# Patient Record
Sex: FEMALE | Race: WHITE | Employment: OTHER | URBAN - METROPOLITAN AREA
[De-identification: names, ages, dates, MRNs, and addresses within clinical notes are randomized per-mention and may not be internally consistent; named-entity substitution may affect disease eponyms.]

---

## 2017-03-31 ENCOUNTER — HOSPITAL ENCOUNTER (OUTPATIENT)
Dept: MAMMOGRAPHY | Age: 62
Discharge: HOME | End: 2017-03-31
Attending: OBSTETRICS & GYNECOLOGY
Payer: COMMERCIAL

## 2017-03-31 DIAGNOSIS — Z12.31 VISIT FOR SCREENING MAMMOGRAM: ICD-10-CM

## 2017-03-31 PROCEDURE — 77067 SCR MAMMO BI INCL CAD: CPT

## 2017-05-31 ENCOUNTER — OFFICE VISIT (OUTPATIENT)
Dept: NEUROLOGY | Age: 62
End: 2017-05-31

## 2017-05-31 VITALS — SYSTOLIC BLOOD PRESSURE: 109 MMHG | DIASTOLIC BLOOD PRESSURE: 61 MMHG | HEART RATE: 65 BPM | OXYGEN SATURATION: 98 %

## 2017-05-31 DIAGNOSIS — G56.03 BILATERAL CARPAL TUNNEL SYNDROME: ICD-10-CM

## 2017-05-31 DIAGNOSIS — M47.22 CERVICAL RADICULOPATHY DUE TO DEGENERATIVE JOINT DISEASE OF SPINE: ICD-10-CM

## 2017-05-31 NOTE — PROCEDURES
[facility] at [address] [facility] [address] [place] [address], [address] [facility]  Tel [phone]     Fax [phone]  Electrodiagnostic Study Report  Test Date:  2017    Patient: Jovana Lucia : 1955 Physician: Enid Box MD   Sex: Male  < Ref Phys: Renzo Funes M.D. Clinical Indication; patient is a 67-year-old right-handed  female with a history of numbness in both hands, and neck pain, EMG study to rule out bilateral carpal tunnel syndrome versus cervical radiculopathy. Impression: This study shows electrophysiologic evidence of an essentially normal EMG and nerve conduction velocity study of both upper extremities, showing no clear evidence of entrapment neuropathy, carpal tunnel syndrome, or cervical radiculopathy on the basis of this exam.  Clinical correlation recommended, and correlation with imaging modalities would be recommended. Follow-up studies in 6-12 months time may also be of further diagnostic benefit if clinically indicated. EMG & NCV Findings:  All nerve conduction studies (as indicated in the following tables) were within normal limits. All F Wave latencies were within normal limits.         Nerve Conduction Studies  Motor Summary Table     Site NR Onset (ms) O-P Amp (mV) Site1 Site2 Delta-0 (ms) Dist (cm) Lamont (m/s)   Left Median Motor (Abd Poll Brev)  23°C   Wrist    3.4 7.9 Elbow Wrist 3.9 21.0 54   Elbow    7.3 7.9        Right Median Motor (Abd Poll Brev)  23.1°C   Wrist    3.4 10.1 Elbow Wrist 3.9 20.5 53   Elbow    7.3 9.1        Right Ulnar Motor (Abd Dig Minimi)  23.1°C   Wrist    2.5 10.7 B Elbow Wrist 2.6 16.0 62   B Elbow    5.1 9.7 A Elbow B Elbow 1.5 10.0 67   A Elbow    6.6 9.3          Comparison Summary Table     Site NR Peak (ms) P-T Amp (µV) Site1 Site2 Delta-P (ms)   Left Median/Ulnar Palm Comparison (Wrist - 8cm)  23°C   Median Palm    2.2 61.3 Median Palm Ulnar Palm 0.1   Ulnar Palm    2.1 46.2      Right Median/Ulnar Palm Comparison (Wrist - 8cm)  23°C   Median Palm    2.2 64.0 Median Palm Ulnar Palm 0.1   Ulnar Palm    2.1 25.8        F Wave Studies     NR F-Lat (ms) L-R F-Lat (ms)   Right Median (Mrkrs) (Abd Poll Brev)  23.1°C      23.46    Right Ulnar (Mrkrs) (Abd Dig Min)  23°C      25.78      EMG     Side Muscle Nerve Root Ins Act Fibs Psw Amp Dur Poly Recrt Comment   Right Abd Poll Brev Median C8-T1 Nml Nml Nml Nml Nml 0 Nml    Right 1stDorInt Ulnar C8-T1 Nml Nml Nml Nml Nml 0 Nml    Right Biceps Musculocut C5-6 Nml Nml Nml Nml Nml 0 Nml    Right Triceps Radial C6-7-8 Nml Nml Nml Nml Nml 0 Nml    Right Deltoid Axillary C5-6 Nml Nml Nml Nml Nml 0 Nml    Right Cervical Parasp Mid Rami C4-6 Nml Nml Nml        Right BrachioRad Radial C5-6 Nml Nml Nml Nml Nml 0 Nml    Right ExtCarRadLong Radial C6-7 Nml Nml Nml Nml Nml 0 Nml    Left Abd Poll Brev Median C8-T1 Nml Nml Nml Nml Nml 0 Nml    Left 1stDorInt Ulnar C8-T1 Nml Nml Nml Nml Nml 0 Nml    Left Biceps Musculocut C5-6 Nml Nml Nml Nml Nml 0 Nml    Left Triceps Radial C6-7-8 Nml Nml Nml Nml Nml 0 Nml    Left Deltoid Axillary C5-6 Nml Nml Nml Nml Nml 0 Nml    Left BrachioRad Radial C5-6 Nml Nml Nml Nml Nml 0 Nml    Left ExtCarRadLong Radial C6-7 Nml Nml Nml Nml Nml 0 Nml    Left Cervical Parasp Mid Rami C4-6 Nml Nml Nml            Waveforms:                         __________________  Dedra Claude, M.D.

## 2017-05-31 NOTE — LETTER
5/31/2017 10:06 AM 
 
Patient:  Marbin Dumont YOB: 1955 Date of Visit: 5/31/2017 Dear No Recipients: Thank you for referring Ms. Marbin Dumont to me for evaluation/treatment. Below are the relevant portions of my assessment and plan of care. EMG dictated in procedure note If you have questions, please do not hesitate to call me. I look forward to following Ms. Broderick along with you. Sincerely, Ronda Vu MD

## 2017-10-04 DIAGNOSIS — E03.9 ACQUIRED HYPOTHYROIDISM: Primary | ICD-10-CM

## 2017-10-05 RX ORDER — LEVOTHYROXINE SODIUM 125 UG/1
TABLET ORAL
Qty: 30 TAB | Refills: 11 | Status: SHIPPED | OUTPATIENT
Start: 2017-10-05 | End: 2018-10-08 | Stop reason: SDUPTHER

## 2017-10-05 NOTE — TELEPHONE ENCOUNTER
Requested Prescriptions     Pending Prescriptions Disp Refills    levothyroxine (SYNTHROID) 125 mcg tablet [Pharmacy Med Name: LEVOTHYROXINE 125 MCG TABLET] 30 Tab 11     Sig: TAKE 1 TABLET BY MOUTH EVERY DAY

## 2018-01-17 DIAGNOSIS — M19.90 ARTHRITIS: Primary | ICD-10-CM

## 2018-01-17 RX ORDER — DICLOFENAC SODIUM 75 MG/1
TABLET, DELAYED RELEASE ORAL
Qty: 60 TAB | Refills: 5 | Status: SHIPPED | OUTPATIENT
Start: 2018-01-17 | End: 2019-01-10 | Stop reason: ALTCHOICE

## 2018-01-17 NOTE — TELEPHONE ENCOUNTER
Requested Prescriptions     Pending Prescriptions Disp Refills    diclofenac EC (VOLTAREN) 75 mg EC tablet [Pharmacy Med Name: DICLOFENAC SOD EC 75 MG TAB] 60 Tab 5     Sig: TAKE 1 TABLET BY MOUTH TWICE A DAY

## 2018-04-17 ENCOUNTER — HOSPITAL ENCOUNTER (OUTPATIENT)
Dept: MAMMOGRAPHY | Age: 63
Discharge: HOME | End: 2018-04-17
Attending: OBSTETRICS & GYNECOLOGY
Payer: COMMERCIAL

## 2018-04-17 DIAGNOSIS — Z12.39 SCREENING BREAST EXAMINATION: ICD-10-CM

## 2018-04-17 PROCEDURE — 77067 SCR MAMMO BI INCL CAD: CPT

## 2018-04-18 ENCOUNTER — OFFICE VISIT (OUTPATIENT)
Dept: INTERNAL MEDICINE CLINIC | Age: 63
End: 2018-04-18

## 2018-04-18 VITALS
BODY MASS INDEX: 22.66 KG/M2 | DIASTOLIC BLOOD PRESSURE: 70 MMHG | HEART RATE: 74 BPM | HEIGHT: 59 IN | RESPIRATION RATE: 13 BRPM | OXYGEN SATURATION: 98 % | WEIGHT: 112.4 LBS | SYSTOLIC BLOOD PRESSURE: 100 MMHG | TEMPERATURE: 97.6 F

## 2018-04-18 DIAGNOSIS — E03.9 ACQUIRED HYPOTHYROIDISM: ICD-10-CM

## 2018-04-18 DIAGNOSIS — Z00.00 ANNUAL PHYSICAL EXAM: Primary | ICD-10-CM

## 2018-04-18 DIAGNOSIS — M81.0 AGE-RELATED OSTEOPOROSIS WITHOUT CURRENT PATHOLOGICAL FRACTURE: ICD-10-CM

## 2018-04-18 DIAGNOSIS — M15.9 PRIMARY OSTEOARTHRITIS INVOLVING MULTIPLE JOINTS: ICD-10-CM

## 2018-04-18 LAB
ALBUMIN SERPL-MCNC: 4.6 G/DL (ref 3.9–5.4)
ALKALINE PHOS POC: 68 U/L (ref 38–126)
ALT SERPL-CCNC: 73 U/L (ref 9–52)
AST SERPL-CCNC: 45 U/L (ref 14–36)
BACTERIA UA POCT, BACTPOCT: NORMAL
BILIRUB UR QL STRIP: NEGATIVE
BUN BLD-MCNC: 20 MG/DL (ref 7–17)
CALCIUM BLD-MCNC: 9.6 MG/DL (ref 8.4–10.2)
CASTS UA POCT: 0
CHLORIDE BLD-SCNC: 102 MMOL/L (ref 98–107)
CHOLEST SERPL-MCNC: 231 MG/DL (ref 0–200)
CLUE CELLS, CLUEPOCT: NEGATIVE
CO2 POC: 27 MMOL/L (ref 22–32)
CREAT BLD-MCNC: 1 MG/DL (ref 0.7–1.2)
CRYSTALS UA POCT, CRYSPOCT: NEGATIVE
EGFR (POC): 59.9
EPITHELIAL CELLS POCT: NORMAL
GLUCOSE POC: 89 MG/DL (ref 65–105)
GLUCOSE UR-MCNC: NEGATIVE MG/DL
GRAN# POC: 3.3 K/UL (ref 2–7.8)
GRAN% POC: 52.7 % (ref 37–92)
HCT VFR BLD CALC: 36.1 % (ref 37–51)
HDLC SERPL-MCNC: 94 MG/DL (ref 35–130)
HGB BLD-MCNC: 12.1 G/DL (ref 12–18)
KETONES P FAST UR STRIP-MCNC: NEGATIVE MG/DL
LDL CHOLESTEROL POC: 119.4 MG/DL (ref 0–130)
LY# POC: 2.4 K/UL (ref 0.6–4.1)
LY% POC: 41.9 % (ref 10–58.5)
MCH RBC QN: 31.2 PG (ref 26–32)
MCHC RBC-ENTMCNC: 33.6 G/DL (ref 30–36)
MCV RBC: 93 FL (ref 80–97)
MID #, POC: 0.3 K/UL (ref 0–1.8)
MID% POC: 5.4 % (ref 0.1–24)
MUCUS UA POCT, MUCPOCT: NORMAL
PH UR STRIP: 6 [PH] (ref 5–7)
PLATELET # BLD: 311 K/UL (ref 140–440)
POTASSIUM SERPL-SCNC: 4.2 MMOL/L (ref 3.6–5)
PROT SERPL-MCNC: 7.6 G/DL (ref 6.3–8.2)
PROT UR QL STRIP: NEGATIVE
RBC # BLD: 3.88 M/UL (ref 4.2–6.3)
RBC UA POCT, RBCPOCT: NORMAL
SODIUM SERPL-SCNC: 142 MMOL/L (ref 137–145)
SP GR UR STRIP: 1.01 (ref 1.01–1.02)
T4 FREE SERPL-MCNC: 1.36 NG/DL (ref 0.71–1.85)
TCHOL/HDL RATIO (POC): 2.5 (ref 0–4)
TOTAL BILIRUBIN POC: 0.7 MG/DL (ref 0.2–1.3)
TRICH UA POCT, TRICHPOC: NEGATIVE
TRIGL SERPL-MCNC: 88 MG/DL (ref 0–200)
TSH BLD-ACNC: 2.65 UIU/ML (ref 0.4–4.2)
UA UROBILINOGEN AMB POC: NORMAL (ref 0.2–1)
URINALYSIS CLARITY POC: CLEAR
URINALYSIS COLOR POC: NORMAL
URINE BLOOD POC: NORMAL
URINE CULT COMMENT, POCT: NORMAL
URINE LEUKOCYTES POC: NORMAL
URINE NITRITES POC: NEGATIVE
VITAMIN D POC: 44.7 NG/ML (ref 30–96)
VLDLC SERPL CALC-MCNC: 17.6 MG/DL
WBC # BLD: 6 K/UL (ref 4.1–10.9)
WBC UA POCT, WBCPOCT: NORMAL
YEAST UA POCT, YEASTPOC: NEGATIVE

## 2018-04-18 RX ORDER — SERTRALINE HYDROCHLORIDE 50 MG/1
TABLET, FILM COATED ORAL
Refills: 1 | COMMUNITY
Start: 2018-04-02 | End: 2020-06-05 | Stop reason: ALTCHOICE

## 2018-04-18 RX ORDER — SULFAMETHOXAZOLE AND TRIMETHOPRIM 800; 160 MG/1; MG/1
1 TABLET ORAL 2 TIMES DAILY
COMMUNITY
End: 2018-06-20 | Stop reason: ALTCHOICE

## 2018-04-18 RX ORDER — ALENDRONATE SODIUM 70 MG/1
70 TABLET ORAL
COMMUNITY
Start: 2017-05-18 | End: 2021-01-19 | Stop reason: ALTCHOICE

## 2018-04-18 NOTE — MR AVS SNAPSHOT
[address] [facility] [place] 
 
 
 [address] [address] [phone] Patient: Leonel Solorzano MRN: [id number] Visit Information Date & Time Provider Department Dept. Phone Encounter #  
 4/18/2018 10:40 AM Joya Gomez MD [facility] [address] [phone] [id number] Follow-up Instructions Return in about 1 year (around 4/18/2019). Your Appointments 4/18/2019  8:30 AM  
FOLLOW UP 10 with MD MAYTE Kearney [facility] ([facility]) Appt Note: follow up yearly [address] [address] [address] [facility] [phone] Upcoming Health Maintenance Date Due COLONOSCOPY 10/7/1972 DTaP/Tdap/Td series (1 - Tdap) 10/7/1975 PAP AKA CERVICAL CYTOLOGY 10/7/1975 ZOSTER VACCINE AGE 60> 8/7/2014 Influenza Age 5 to Adult 8/1/2017 BREAST CANCER SCRN MAMMOGRAM 3/31/2019 Allergies as of 4/18/2018  Review Complete On: 4/18/2018 By: Joya Gomez MD  
 No Known Allergies Current Immunizations  Never Reviewed No immunizations on file. Not reviewed this visit You Were Diagnosed With   
  
 Codes Comments Annual physical exam    -  Primary ICD-10-CM: Z00.00 ICD-9-CM: V70.0 Acquired hypothyroidism     ICD-10-CM: E03.9 ICD-9-CM: 244.9 Primary osteoarthritis involving multiple joints     ICD-10-CM: M15.0 ICD-9-CM: 715.09 Age-related osteoporosis without current pathological fracture     ICD-10-CM: M81.0 ICD-9-CM: 733.01 Vitals BP Pulse Temp Resp Height(growth percentile) Weight(growth percentile) 100/70 (BP 1 Location: Left arm, BP Patient Position: Sitting) 74 97.6 °F (36.4 °C) (Oral) 13 4' 11\" (1.499 m) 112 lb 6.4 oz (51 kg) SpO2 BMI OB Status Smoking Status 98% 22.7 kg/m2 Menopause Never Smoker Vitals History BMI and BSA Data Body Mass Index Body Surface Area 22.7 kg/m 2 1.46 m 2 Preferred Pharmacy Pharmacy Name Phone [id number] [facility] [phone] Your Updated Medication List  
  
   
This list is accurate as of 4/18/18 12:03 PM.  Always use your most recent med list.  
  
  
  
  
 alendronate 70 mg tablet Commonly known as:  FOSAMAX Take 70 mg by mouth. BACTRIM -800 mg per tablet Generic drug:  trimethoprim-sulfamethoxazole Take 1 Tab by mouth two (2) times a day. diclofenac EC 75 mg EC tablet Commonly known as:  VOLTAREN  
TAKE 1 TABLET BY MOUTH TWICE A DAY  
  
 levothyroxine 125 mcg tablet Commonly known as:  SYNTHROID  
TAKE 1 TABLET BY MOUTH EVERY DAY  
  
 sertraline 50 mg tablet Commonly known as:  ZOLOFT  
TAKE 1 TABLET(S) EVERY DAY BY ORAL ROUTE. We Performed the Following AMB POC COMPLETE CBC,AUTOMATED ENTER [id number] CPT(R)] AMB POC COMPREHENSIVE METABOLIC PANEL [00404 CPT(R)] AMB POC LIPID PROFILE [69232 CPT(R)] AMB POC T4, FREE [id number] CPT(R)] AMB POC TSH [68471 CPT(R)] AMB POC URINALYSIS DIP STICK AUTO W/ MICRO  [57393 CPT(R)] AMB POC VITAMIN D [52608 CPT(R)] HEPATITIS C AB [60265 CPT(R)] Follow-up Instructions Return in about 1 year (around 4/18/2019). Patient Instructions Well Visit, Women 48 to 72: Care Instructions Your Care Instructions Physical exams can help you stay healthy. Your doctor has checked your overall health and may have suggested ways to take good care of yourself. He or she also may have recommended tests. At home, you can help prevent illness with healthy eating, regular exercise, and other steps. Follow-up care is a key part of your treatment and safety.  Be sure to make and go to all appointments, and call your doctor if you are having problems. It's also a good idea to know your test results and keep a list of the medicines you take. How can you care for yourself at home? · Reach and stay at a healthy weight. This will lower your risk for many problems, such as obesity, diabetes, heart disease, and high blood pressure. · Get at least 30 minutes of exercise on most days of the week. Walking is a good choice. You also may want to do other activities, such as running, swimming, cycling, or playing tennis or team sports. · Do not smoke. Smoking can make health problems worse. If you need help quitting, talk to your doctor about stop-smoking programs and medicines. These can increase your chances of quitting for good. · Protect your skin from too much sun. When you're outdoors from 10 a.m. to 4 p.m., stay in the shade or cover up with clothing and a hat with a wide brim. Wear sunglasses that block UV rays. Even when it's cloudy, put broad-spectrum sunscreen (SPF 30 or higher) on any exposed skin. · See a dentist one or two times a year for checkups and to have your teeth cleaned. · Wear a seat belt in the car. · Limit alcohol to 1 drink a day. Too much alcohol can cause health problems. Follow your doctor's advice about when to have certain tests. These tests can spot problems early. · Cholesterol. Your doctor will tell you how often to have this done based on your age, family history, or other things that can increase your risk for heart attack and stroke. · Blood pressure. Have your blood pressure checked during a routine doctor visit. Your doctor will tell you how often to check your blood pressure based on your age, your blood pressure results, and other factors. · Mammogram. Ask your doctor how often you should have a mammogram, which is an X-ray of your breasts. A mammogram can spot breast cancer before it can be felt and when it is easiest to treat.  
· Pap test and pelvic exam. Ask your doctor how often you should have a Pap test. You may not need to have a Pap test as often as you used to. · Vision. Have your eyes checked every year or two or as often as your doctor suggests. Some experts recommend that you have yearly exams for glaucoma and other age-related eye problems starting at age 48. · Hearing. Tell your doctor if you notice any change in your hearing. You can have tests to find out how well you hear. · Diabetes. Ask your doctor whether you should have tests for diabetes. · Colon cancer. You should begin tests for colon cancer at age 48. You may have one of several tests. Your doctor will tell you how often to have tests based on your age and risk. Risks include whether you already had a precancerous polyp removed from your colon or whether your parents, sisters and brothers, or children have had colon cancer. · Thyroid disease. Talk to your doctor about whether to have your thyroid checked as part of a regular physical exam. Women have an increased chance of a thyroid problem. · Osteoporosis. You should begin tests for bone density at age 72. If you are younger than 72, ask your doctor whether you have factors that may increase your risk for this disease. You may want to have this test before age 72. · Heart attack and stroke risk. At least every 4 to 6 years, you should have your risk for heart attack and stroke assessed. Your doctor uses factors such as your age, blood pressure, cholesterol, and whether you smoke or have diabetes to show what your risk for a heart attack or stroke is over the next 10 years. When should you call for help? Watch closely for changes in your health, and be sure to contact your doctor if you have any problems or symptoms that concern you. Where can you learn more? Go to http://rose-velvet.info/. Enter N621 in the search box to learn more about \"Well Visit, Women 50 to 72: Care Instructions. \" Current as of: May 12, 2017 Content Version: 11.4 © 6945-3281 Healthwise, Incorporated. Care instructions adapted under license by [employer] (which disclaims liability or warranty for this information). If you have questions about a medical condition or this instruction, always ask your healthcare professional. [address] any warranty or liability for your use of this information. Introducing [facility]! Naomie Nelson introduces [employer] patient portal. Now you can access parts of your medical record, email your doctor's office, and request medication refills online. 1. In your internet browser, go to [email or web address] 2. Click on the First Time User? Click Here link in the Sign In box. You will see the New Member Sign Up page. 3. Enter your [employer] Access Code exactly as it appears below. You will not need to use this code after youve completed the sign-up process. If you do not sign up before the expiration date, you must request a new code. · [employer] Access Code: [email or web address] Expires: 6/17/2018 10:44 AM 
 
4. Enter the last four digits of your Social Security Number (xxxx) and Date of Birth (mm/dd/yyyy) as indicated and click Submit. You will be taken to the next sign-up page. 5. Create a [employer] ID. This will be your [employer] login ID and cannot be changed, so think of one that is secure and easy to remember. 6. Create a [employer] password. You can change your password at any time. 7. Enter your Password Reset Question and Answer. This can be used at a later time if you forget your password. 8. Enter your e-mail address. You will receive e-mail notification when new information is available in [address] 9. Click Sign Up. You can now view and download portions of your medical record. 10. Click the Download Summary menu link to download a portable copy of your medical information.  
 
If you have questions, please visit the Frequently Asked Questions section of the [employer]. Remember, [employer] is NOT to be used for urgent needs. For medical emergencies, dial 911. Now available from your iPhone and Android! Please provide this summary of care documentation to your next provider. Your primary care clinician is listed as Andres. If you have any questions after today's visit, please call [phone].

## 2018-04-18 NOTE — PROGRESS NOTES
Chief Complaint   Patient presents with   Solis Lau Annual Wellness Visit     yearly    Carpal Tunnel     1. Have you been to the ER, urgent care clinic since your last visit? Hospitalized since your last visit? No    2. Have you seen or consulted any other health care providers outside of the [address] [place] [address] since your last visit? Include any pap smears or colon screening.  Yes, [facility]    Mammogram done on 4/17/18      Not Fasting

## 2018-04-18 NOTE — PROGRESS NOTES
Annual Wellness Visit (yearly) and Carpal Tunnel       HPI:     Randy Bernal is a 60y.o. year old female who is here for her annual comprehensive healthcare exam and follow-up of medical problems include DJD, hypothyroidism, and osteoporosis. She is taking her medications and trying to follow her diet and get some exercise on regular basis. She has no chest pain, shortness breath, palpitations or cardiorespiratory complaints. She has no GI or  complaints except some incontinence which she is being evaluated by a GYN urologist at [facility]. She denies any headaches, dizziness or neurologic complaints. She denies any arthritis complaints and there are no other complaints on complete review of systems. Visit Vitals    /70 (BP 1 Location: Left arm, BP Patient Position: Sitting)    Pulse 74    Temp 97.6 °F (36.4 °C) (Oral)    Resp 13    Ht 4' 11\" (1.499 m)    Wt 112 lb 6.4 oz (51 kg)    SpO2 98%    BMI 22.7 kg/m2       Past Medical History:   Diagnosis Date    Back pain     Menopause     Neck pain        History reviewed. No pertinent surgical history. Prior to Admission medications    Medication Sig Start Date End Date Taking? Authorizing Provider   alendronate (FOSAMAX) 70 mg tablet Take 70 mg by mouth. 5/18/17  Yes Historical Provider   sertraline (ZOLOFT) 50 mg tablet TAKE 1 TABLET(S) EVERY DAY BY ORAL ROUTE. 4/2/18  Yes Historical Provider   trimethoprim-sulfamethoxazole (BACTRIM DS) 160-800 mg per tablet Take 1 Tab by mouth two (2) times a day.    Yes Historical Provider   diclofenac EC (VOLTAREN) 75 mg EC tablet TAKE 1 TABLET BY MOUTH TWICE A DAY 1/17/18  Yes Dav Ramos MD   levothyroxine (SYNTHROID) 125 mcg tablet TAKE 1 TABLET BY MOUTH EVERY DAY 10/5/17  Yes Dav Ramos MD        No Known Allergies     Family History   Problem Relation Age of Onset    Breast Cancer Paternal Grandmother      over 48    Stroke Mother     Heart Attack Father Social History     Social History    Marital status:      Spouse name: N/A    Number of children: N/A    Years of education: N/A     Occupational History    Not on file. Social History Main Topics    Smoking status: Never Smoker    Smokeless tobacco: Never Used    Alcohol use Yes    Drug use: No    Sexual activity: Not Currently     Other Topics Concern    Not on file     Social History Narrative        ROS:     Constitutional: She denies fevers, weight loss, sweats. Eyes: No blurred or double vision. ENT: No difficulty with swallowing, taste, speech or smell. NECK: no stiffness swelling or lymph node enlargement  Respiratory: No cough wheezing or shortness of breath. Cardiovascular: Denies chest pain, palpitations, unexplained indigestion or syncope. Breast: She has noted no masses or lumps and no discharge or axillary swelling  Gastrointestinal:  No changes in bowel movements, no abdominal pain, no bloating. Genitourinary: No discharge or abnormal bleeding or pain. Some incontinence being evaluated by GYN urology Dr. Lanie Colin  Extremities: No joint pain, stiffness or swelling. Neurological:  No numbness, tingling, burring paresthesias or loss of motor strength. No syncope, dizziness or frequent headache  Skin:  No recent rashes or mole changes. Psychiatric/Behavioral:  Negative for depression. The patient is not nervous/anxious.    HEMATOLOGIC: no easy bruising or bleeding gums  Endocrine: no sweats of urinary frequency or excessive thirst      Physical Examination:     Vitals:    04/18/18 1120   BP: 100/70   Pulse: 74   Resp: 13   Temp: 97.6 °F (36.4 °C)   TempSrc: Oral   SpO2: 98%   Weight: 112 lb 6.4 oz (51 kg)   Height: 4' 11\" (1.499 m)   PainSc:   0 - No pain        General appearance - alert, well appearing, and in no distress  Mental status - alert, oriented to person, place, and time  HEENT:  Ears - bilateral TM's and external ear canals clear  Eyes - pupillary responses were normal.  Extraocular muscle function intact. Lids and conjunctiva not injected. Fundoscopic exam revealed sharp disc margins. eye movements intact  Pharynx- clear with teeth in good repair. No masses were noted  Neck - supple without thyromegaly or burit. No JVD noted  Lungs - clear to auscultation and percussion  Cardiac- normal rate, regular rhythm without murmurs. PMI not displaced. No gallop, rub or click  Breast: deferred to GYN  Abdomen - flat, soft, non-tender without palpable organomegaly or mass. No pulsatile mass was felt, and not bruit was heard. Bowel sounds were active   Female - deferred to GYN  Rectal - deferred to GYN  Extremities -  no clubbing cyanosis or edema  Lymphatics - no palpable lymphadenopathy, no hepatosplenomegaly  Peripheral vascular - Dorsalis pedis and posterior tibial pulses felt without difficulty  Skin - no rash or unusual mole change noted  Neurological - Cranial nerves II-XII grossly intact. Motor strength 5/5. DTR's 2+ and symmetric. Station and gait normal  Back exam - full range of motion, no tenderness, palpable spasm or pain on motion  Musculoskeletal - no joint tenderness, deformity or swelling  Hematologic: no purpura, petechiae or bruising    Assessment/Plan:     1. Annual physical exam    2. Acquired hypothyroidism    3. Primary osteoarthritis involving multiple joints    4. Age-related osteoporosis without current pathological fracture      Impression  1. Annual physical examination normal  2. Hypothyroidism status pending reviewed last numbers  3. DJD that is stable  4. Osteoporosis bone density done through [facility] last bone density done 3/25/2017 reviewed by me today showing osteoporosis for which she is on treatment  I will call the lab make further recommendations and adjustments if necessary. Follow stable continue same and I will recheck a myself again yearly if all stable with lab.     Orders Placed This Encounter    HEPATITIS C AB    AMB POC COMPLETE CBC,AUTOMATED ENTER    AMB POC COMPREHENSIVE METABOLIC PANEL    AMB POC LIPID PROFILE    AMB POC T4, FREE    AMB POC URINALYSIS DIP STICK AUTO W/ MICRO     AMB POC TSH    AMB POC VITAMIN D    alendronate (FOSAMAX) 70 mg tablet     Sig: Take 70 mg by mouth.  sertraline (ZOLOFT) 50 mg tablet     Sig: TAKE 1 TABLET(S) EVERY DAY BY ORAL ROUTE. Refill:  1    trimethoprim-sulfamethoxazole (BACTRIM DS) 160-800 mg per tablet     Sig: Take 1 Tab by mouth two (2) times a day. Results for orders placed or performed in visit on 04/18/18   AMB POC COMPLETE CBC,AUTOMATED ENTER   Result Value Ref Range    WBC (POC)  4.1 - 10.9 K/uL    RBC (POC)  4.20 - 6.30 M/uL    HGB (POC)  12.0 - 18.0 g/dL    HCT (POC)  37.0 - 51.0 %    MCV (POC)  80.0 - 97.0 fL    MCH (POC)  26.0 - 32.0 pg    MCHC (POC)  30.0 - 36.0 g/dL    PLATELET (POC)  115.2 - 440.0 K/uL    ABS. LYMPHS (POC)  0.6 - 4.1 K/uL    LYMPHOCYTES (POC)  10.0 - 58.5 %    Mid # (POC)  0.0 - 1.8 K/uL    MID% POC  0.1 - 24.0 %    ABS.  GRANS (POC)  2.0 - 7.8 K/uL    GRANULOCYTES (POC)  37.0 - 92.0 %   AMB POC COMPREHENSIVE METABOLIC PANEL   Result Value Ref Range    GLUCOSE  65 - 105 mg/dL    BUN  7 - 17 mg/dL    Creatinine (POC)  0.7 - 1.2 mg/dL    Sodium (POC)  137 - 145 MMOL/L    Potassium (POC)  3.6 - 5.0 MMOL/L    CHLORIDE  98 - 107 MMOL/L    CO2  22 - 32 MMOL/L    CALCIUM  8.4 - 10.2 mg/dL    TOTAL PROTEIN  6.3 - 8.2 g/dL    ALBUMIN  3.9 - 5.4 g/dL    AST (POC)  14 - 36 U/L    ALT (POC)  9 - 52 U/L    ALKALINE PHOS  38 - 126 U/L    TOTAL BILIRUBIN  0.2 - 1.3 mg/dL    eGFR (POC)     AMB POC LIPID PROFILE   Result Value Ref Range    Cholesterol (POC)  0 - 200 mg/dL    Triglycerides (POC)  0 - 200 mg/dL    HDL Cholesterol (POC)  35 - 130 mg/dL    VLDL (POC)  MG/DL    LDL Cholesterol (POC)  0.0 - 130.0 MG/DL    TChol/HDL Ratio (POC)  0.0 - 4.0   AMB POC T4, FREE   Result Value Ref Range    FREE T4 (POC)  0.71 - 1.85 ng/dL AMB POC URINALYSIS DIP STICK AUTO W/ MICRO    Result Value Ref Range    Color (UA POC)      Clarity (UA POC)      Glucose (UA POC)  Negative    Bilirubin (UA POC)  Negative    Ketones (UA POC)  Negative    Specific gravity (UA POC)  1.010 - 1.025    Blood (UA POC)  Negative    pH (UA POC)  5.0 - 7.0    Protein (UA POC)  Negative    Urobilinogen (UA POC)  0.2 - 1    Nitrites (UA POC)  Negative    Leukocyte esterase (UA POC)  Negative    Epithelial cells (UA POC)      Mucus (UA POC)      WBCs (UA POC)      RBCs (UA POC)      Casts (UA POC)  Negative    Crystals (UA POC)  Negative    Clue Cells (UA POC)      Trichomonas (UA POC)      Yeast (UA POC)      Bacteria (UA POC)  Negative    URINE CULT COMMENT (UA POC)     AMB POC TSH   Result Value Ref Range    TSH POC  0.40 - 4.20 uIU/ml   AMB POC VITAMIN D   Result Value Ref Range    Vitamin D (POC)  30 - 96 ng/mL       I have reviewed the patient's medical history in detail and updated the computerized patient record. We had a prolonged discussion about these complex clinical issues and went over the various important aspects to consider. All questions were answered. Advised her to call back or return to office if symptoms do not improve, change in nature, or persist.    She was given an after visit summary or informed of [facility] Access which includes patient instructions, diagnoses, current medications, & vitals. She expressed understanding with the diagnosis and plan.       Doreen Guerra MD

## 2018-04-18 NOTE — PATIENT INSTRUCTIONS
Well Visit, Women 48 to 72: Care Instructions  Your Care Instructions    Physical exams can help you stay healthy. Your doctor has checked your overall health and may have suggested ways to take good care of yourself. He or she also may have recommended tests. At home, you can help prevent illness with healthy eating, regular exercise, and other steps. Follow-up care is a key part of your treatment and safety. Be sure to make and go to all appointments, and call your doctor if you are having problems. It's also a good idea to know your test results and keep a list of the medicines you take. How can you care for yourself at home? · Reach and stay at a healthy weight. This will lower your risk for many problems, such as obesity, diabetes, heart disease, and high blood pressure. · Get at least 30 minutes of exercise on most days of the week. Walking is a good choice. You also may want to do other activities, such as running, swimming, cycling, or playing tennis or team sports. · Do not smoke. Smoking can make health problems worse. If you need help quitting, talk to your doctor about stop-smoking programs and medicines. These can increase your chances of quitting for good. · Protect your skin from too much sun. When you're outdoors from 10 a.m. to 4 p.m., stay in the shade or cover up with clothing and a hat with a wide brim. Wear sunglasses that block UV rays. Even when it's cloudy, put broad-spectrum sunscreen (SPF 30 or higher) on any exposed skin. · See a dentist one or two times a year for checkups and to have your teeth cleaned. · Wear a seat belt in the car. · Limit alcohol to 1 drink a day. Too much alcohol can cause health problems. Follow your doctor's advice about when to have certain tests. These tests can spot problems early. · Cholesterol.  Your doctor will tell you how often to have this done based on your age, family history, or other things that can increase your risk for heart attack and stroke. · Blood pressure. Have your blood pressure checked during a routine doctor visit. Your doctor will tell you how often to check your blood pressure based on your age, your blood pressure results, and other factors. · Mammogram. Ask your doctor how often you should have a mammogram, which is an X-ray of your breasts. A mammogram can spot breast cancer before it can be felt and when it is easiest to treat. · Pap test and pelvic exam. Ask your doctor how often you should have a Pap test. You may not need to have a Pap test as often as you used to. · Vision. Have your eyes checked every year or two or as often as your doctor suggests. Some experts recommend that you have yearly exams for glaucoma and other age-related eye problems starting at age 48. · Hearing. Tell your doctor if you notice any change in your hearing. You can have tests to find out how well you hear. · Diabetes. Ask your doctor whether you should have tests for diabetes. · Colon cancer. You should begin tests for colon cancer at age 48. You may have one of several tests. Your doctor will tell you how often to have tests based on your age and risk. Risks include whether you already had a precancerous polyp removed from your colon or whether your parents, sisters and brothers, or children have had colon cancer. · Thyroid disease. Talk to your doctor about whether to have your thyroid checked as part of a regular physical exam. Women have an increased chance of a thyroid problem. · Osteoporosis. You should begin tests for bone density at age 72. If you are younger than 72, ask your doctor whether you have factors that may increase your risk for this disease. You may want to have this test before age 72. · Heart attack and stroke risk. At least every 4 to 6 years, you should have your risk for heart attack and stroke assessed.  Your doctor uses factors such as your age, blood pressure, cholesterol, and whether you smoke or have diabetes to show what your risk for a heart attack or stroke is over the next 10 years. When should you call for help? Watch closely for changes in your health, and be sure to contact your doctor if you have any problems or symptoms that concern you. Where can you learn more? Go to http://rose-velvet.info/. Enter J716 in the search box to learn more about \"Well Visit, Women 50 to 72: Care Instructions. \"  Current as of: May 12, 2017  Content Version: 11.4  © 3029-1432 [employer]. Care instructions adapted under license by [employer] (which disclaims liability or warranty for this information). If you have questions about a medical condition or this instruction, always ask your healthcare professional. [address] any warranty or liability for your use of this information.

## 2018-04-19 LAB — HCV AB S/CO SERPL IA: <0.1 S/CO RATIO (ref 0–0.9)

## 2018-04-23 NOTE — PROGRESS NOTES
Labs okay except for elevated liver enzymes and I am not sure what her baseline is. I need to know her baseline to know if this is stable loss of the we need to follow-up.

## 2018-04-27 NOTE — PROGRESS NOTES
Labs okay except for elevated liver enzymes and I am not sure what her baseline is. I need to know her baseline to know if this is stable loss of the we need to follow-up. Patient informed. Scheduled for f/up LFT's.

## 2018-05-09 ENCOUNTER — LAB ONLY (OUTPATIENT)
Dept: INTERNAL MEDICINE CLINIC | Age: 63
End: 2018-05-09

## 2018-05-09 DIAGNOSIS — R79.89 ELEVATED LFTS: Primary | ICD-10-CM

## 2018-05-10 LAB
ALBUMIN SERPL-MCNC: 4.5 G/DL (ref 3.9–5.4)
ALKALINE PHOS POC: 57 U/L (ref 38–126)
ALT SERPL-CCNC: 56 U/L (ref 9–52)
AST SERPL-CCNC: 42 U/L (ref 14–36)
BILIRUBIN, CONJUGATED POC: 0 (ref 0–0.3)
BILIRUBIN, UNCONJUGATED POC: 0.2 (ref 0–1.1)
PROT SERPL-MCNC: 7.3 G/DL (ref 6.3–8.2)
TOTAL BILIRUBIN POC: 0.6 MG/DL (ref 0.2–1.3)

## 2018-05-11 NOTE — PROGRESS NOTES
Liver enzymes are still elevated a little although improved so I will get her back in about a month to see me and will repeat the liver enzymes.

## 2018-05-18 NOTE — PROGRESS NOTES
Liver enzymes are still elevated a little although improved so I will get her back in about a month to see me and will repeat the liver enzymes. Patient informed. Appointment scheduled.

## 2018-06-19 PROBLEM — R74.8 ELEVATED LIVER ENZYMES: Status: ACTIVE | Noted: 2018-06-19

## 2018-06-19 PROBLEM — N18.30 STAGE 3 CHRONIC KIDNEY DISEASE (HCC): Status: ACTIVE | Noted: 2018-06-19

## 2018-06-19 PROBLEM — E78.2 MIXED HYPERLIPIDEMIA: Status: ACTIVE | Noted: 2018-06-19

## 2018-06-20 ENCOUNTER — OFFICE VISIT (OUTPATIENT)
Dept: INTERNAL MEDICINE CLINIC | Age: 63
End: 2018-06-20

## 2018-06-20 VITALS
WEIGHT: 112.2 LBS | OXYGEN SATURATION: 98 % | HEIGHT: 59 IN | HEART RATE: 63 BPM | DIASTOLIC BLOOD PRESSURE: 70 MMHG | RESPIRATION RATE: 15 BRPM | TEMPERATURE: 98.3 F | SYSTOLIC BLOOD PRESSURE: 104 MMHG | BODY MASS INDEX: 22.62 KG/M2

## 2018-06-20 DIAGNOSIS — R74.8 ELEVATED LIVER ENZYMES: Primary | ICD-10-CM

## 2018-06-20 DIAGNOSIS — E78.2 MIXED HYPERLIPIDEMIA: ICD-10-CM

## 2018-06-20 LAB
ALBUMIN SERPL-MCNC: 4.6 G/DL (ref 3.9–5.4)
ALKALINE PHOS POC: 58 U/L (ref 38–126)
ALT SERPL-CCNC: 58 U/L (ref 9–52)
AST SERPL-CCNC: 46 U/L (ref 14–36)
BILIRUBIN, CONJUGATED POC: 0 (ref 0–0.3)
BILIRUBIN, UNCONJUGATED POC: 0.2 (ref 0–1.1)
PROT SERPL-MCNC: 7.8 G/DL (ref 6.3–8.2)
TOTAL BILIRUBIN POC: 0.7 MG/DL (ref 0.2–1.3)

## 2018-06-20 NOTE — PATIENT INSTRUCTIONS
Liver Function Tests: About These Tests  What is it? Liver function tests check how well your liver is working. Some tests measure the amount of certain enzymes in your blood to check whether the liver is damaged or inflamed. Other tests measure how well the liver can make important proteins or clear waste products from the body. Your doctor will use the test results to help look for certain conditions, such as hepatitis, cirrhosis, or gallbladder problems. Test results that are not normal do not always mean there is a problem with your liver. Your doctor can determine if there is a problem based on your results. The tests may include:  · Alkaline phosphatase (ALP). This test measures the amount of the enzyme ALP in your blood. · Total protein. A total serum protein test measures the amounts of two major groups of proteins in your blood (albumin and globulin) and the total amount of protein. · Bilirubin. This test measures the amount of bilirubin in your blood. When bilirubin levels are high, the skin and whites of the eyes may appear yellow (jaundice). This may be caused by liver disease. · Aspartate aminotransferase (AST) and alanine aminotransferase (ALT). These tests measure the amount of the enzymes AST and ALT in your blood. (Aminotransferase is also known as a transaminase. High levels may be called transaminitis.)  Why is this test done? Liver function tests check how well your liver is working. Some tests help show whether your liver is damaged or inflamed. Your doctor may order liver function tests if you have symptoms of liver disease. These tests also may be done to see how well a treatment for liver disease is working. How can you prepare for the test?  In general, you do not need to prepare before having these tests. Your doctor may give you some specific instructions to prepare. What happens during the test?  A health professional takes a sample of your blood.   What happens after the test?  You will probably be able to go home right away. When should you call for help? Watch closely for changes in your health, and be sure to contact your doctor if you have any problems. Follow-up care is a key part of your treatment and safety. Be sure to make and go to all appointments, and call your doctor if you are having problems. It's also a good idea to keep a list of the medicines you take. Ask your doctor when you can expect to have your test results. Where can you learn more? Go to http://rose-velvet.info/. Enter F213 in the search box to learn more about \"Liver Function Tests: About These Tests. \"  Current as of: October 14, 2016  Content Version: 11.4  © 6452-7994 [employer]. Care instructions adapted under license by [employer] (which disclaims liability or warranty for this information). If you have questions about a medical condition or this instruction, always ask your healthcare professional. [address] any warranty or liability for your use of this information.

## 2018-06-20 NOTE — PROGRESS NOTES
Chief Complaint   Patient presents with    Labs     follow up on labs      1. Have you been to the ER, urgent care clinic since your last visit? Hospitalized since your last visit? No    2. Have you seen or consulted any other health care providers outside of the [address] [place] [address] since your last visit? Include any pap smears or colon screening.  No

## 2018-06-20 NOTE — PROGRESS NOTES
Chief Complaint   Patient presents with    Labs     follow up on labs        SUBJECTIVE:    Colleen Anglin is a 60 y.o. female who returns in follow-up for elevated liver enzymes which were found yearly visit. She notes occasional discomfort after eating and abdomen but nothing severe. She denies any heartburn, but does have some increased belching when asked. She denies any change in bowel habits or change in color of urination or bowels. She denies any fevers, chills night sweats or other complaints and she is not aware of any hepatitis exposure. There are no other complaints noted. Current Outpatient Prescriptions   Medication Sig Dispense Refill    alendronate (FOSAMAX) 70 mg tablet Take 70 mg by mouth.  sertraline (ZOLOFT) 50 mg tablet TAKE 1 TABLET(S) EVERY DAY BY ORAL ROUTE.100mg  1    diclofenac EC (VOLTAREN) 75 mg EC tablet TAKE 1 TABLET BY MOUTH TWICE A DAY 60 Tab 5    levothyroxine (SYNTHROID) 125 mcg tablet TAKE 1 TABLET BY MOUTH EVERY DAY 30 Tab 11     Past Medical History:   Diagnosis Date    Back pain     Menopause     Neck pain      History reviewed. No pertinent surgical history.   No Known Allergies    REVIEW OF SYSTEMS:  General: negative for - chills or fever, or weight loss or gain  ENT: negative for - headaches, nasal congestion or tinnitus  Eyes: no blurred or visual changes  Neck: No stiffness or swollen nodes  Respiratory: negative for - cough, hemoptysis, shortness of breath or wheezing  Cardiovascular : negative for - chest pain, edema, palpitations or shortness of breath  Gastrointestinal: negative for - abdominal pain, blood in stools, heartburn or nausea/vomiting  Genito-Urinary: no dysuria, trouble voiding, or hematuria  Musculoskeletal: negative for - gait disturbance, joint pain, joint stiffness or joint swelling  Neurological: no TIA or stroke symptoms  Hematologic: no bruises, no bleeding  Lymphatic: no swollen glands  Integument: no lumps, mole changes, nail changes or rash  Endocrine:no malaise/lethargy poly uria or polydipsia or unexpected weight changes        Social History     Social History    Marital status:      Spouse name: N/A    Number of children: N/A    Years of education: N/A     Social History Main Topics    Smoking status: Never Smoker    Smokeless tobacco: Never Used    Alcohol use Yes    Drug use: No    Sexual activity: Not Currently     Other Topics Concern    None     Social History Narrative     Family History   Problem Relation Age of Onset    Breast Cancer Paternal Grandmother      over 48    Stroke Mother     Heart Attack Father        OBJECTIVE:     Visit Vitals    /70 (BP 1 Location: Left arm, BP Patient Position: Sitting)    Pulse 63    Temp 98.3 °F (36.8 °C) (Oral)    Resp 15    Ht 4' 11\" (1.499 m)    Wt 112 lb 3.2 oz (50.9 kg)    SpO2 98%    BMI 22.66 kg/m2     CONSTITUTIONAL:   well nourished, appears age appropriate  EYES: sclera anicteric, PERRL, EOMI  ENMT:nares clear, moist mucous membranes, pharynx clear  NECK: supple. Thyroid normal, No JVD or bruits  RESPIRATORY: Chest: clear to ascultation and percussion, normal inspiratory effort  CARDIOVASCULAR: Heart: regular rate and rhythm no murmurs, rubs or gallops, PMI not displaced, No thrills  GASTROINTESTINAL: Abdomen: non distended, soft, non tender, bowel sounds normal  HEMATOLOGIC: no purpura, petechiae or bruising  LYMPHATIC: No lymph node enlargemant  MUSCULOSKELETAL: Extremities: no edema or active synovitis, pulse 1+   INTEGUMENT: No unusual rashes or suspicious skin lesions noted. Nails appear normal.  PERIPHERAL VASCULAR: normal pulses femoral, PT and DP  NEUROLOGIC: non-focal exam, A & O X 3  PSYCHIATRIC:, appropriate affect     ASSESSMENT:   1. Elevated liver enzymes    2. Mixed hyperlipidemia      Impression  1.   Elevated liver enzymes repeat status pending will make adjustments as necessary if liver enzymes are elevated higher than I think we need to proceed with hepatitis studies and probably abdominal ultrasound  2. Elevated hyperlipidemia certainly possible because of elevated liver enzymes  If all is stable or improved then we will probably just recheck her at her regular annual appointment. PLAN:  .  Orders Placed This Encounter    AMB POC HEPATIC FUNCTION PANEL         ATTENTION:   This medical record was transcribed using an electronic medical records system. Although proofread, it may and can contain electronic and spelling errors. Other human spelling and other errors may be present. Corrections may be executed at a later time. Please feel free to contact us for any clarifications as needed. Follow-up Disposition:  Return for At prior schedule appointment. No results found for any visits on 06/20/18. Tracy Hernandez MD    The patient verbalized understanding of the problems and plans as explained.

## 2018-06-20 NOTE — MR AVS SNAPSHOT
[facility] 
 
 
 [address] [address] [phone] Patient: Margaret Anderson MRN: [id number] QNT:[id number] Visit Information Date & Time Provider Department Dept. Phone Encounter #  
 6/20/2018  1:20 PM Jennifer Vick MD [address] [facility] [phone] [id number] Your Appointments 4/18/2019  8:30 AM  
FOLLOW UP 10 with MD MAYTE [facility] ([facility]) Appt Note: follow up yearly [address] [id number] [address] [address] Upcoming Health Maintenance Date Due DTaP/Tdap/Td series (1 - Tdap) 10/7/1975 PAP AKA CERVICAL CYTOLOGY 10/7/1975 ZOSTER VACCINE AGE 60> 8/7/2014 Influenza Age 5 to Adult 8/1/2018 BREAST CANCER SCRN MAMMOGRAM 4/17/2020 COLONOSCOPY 3/3/2021 Allergies as of 6/20/2018  Review Complete On: 6/20/2018 By: Jennifer Vick MD  
 No Known Allergies Current Immunizations  Never Reviewed No immunizations on file. Not reviewed this visit You Were Diagnosed With   
  
 Codes Comments Elevated liver enzymes    -  Primary ICD-10-CM: R74.8 ICD-9-CM: 790.5 Mixed hyperlipidemia     ICD-10-CM: E78.2 ICD-9-CM: 272.2 Vitals BP Pulse Temp Resp Height(growth percentile) Weight(growth percentile) 104/70 (BP 1 Location: Left arm, BP Patient Position: Sitting) 63 98.3 °F (36.8 °C) (Oral) 15 4' 11\" (1.499 m) 112 lb 3.2 oz (50.9 kg) SpO2 BMI OB Status Smoking Status 98% 22.66 kg/m2 Menopause Never Smoker Vitals History BMI and BSA Data Body Mass Index Body Surface Area  
 22.66 kg/m 2 1.46 m 2 Preferred Pharmacy Pharmacy Name Phone [phone] [facility] [phone] Your Updated Medication List  
  
   
This list is accurate as of 6/20/18  2:05 PM.  Always use your most recent med list.  
  
  
  
  
 alendronate 70 mg tablet Commonly known as:  FOSAMAX Take 70 mg by mouth. diclofenac EC 75 mg EC tablet Commonly known as:  VOLTAREN  
TAKE 1 TABLET BY MOUTH TWICE A DAY  
  
 levothyroxine 125 mcg tablet Commonly known as:  SYNTHROID  
TAKE 1 TABLET BY MOUTH EVERY DAY  
  
 sertraline 50 mg tablet Commonly known as:  ZOLOFT  
TAKE 1 TABLET(S) EVERY DAY BY ORAL Deloris Escobar Introducing [facility] SERVICES! Allen Becerra introduces [employer].MOT[employer] patient portal. Now you can access parts of your medical record, email your doctor's office, and request medication refills online. 1. In your internet browser, go to [email or web address]. [place] [employer] 2. Click on the First Time User? Click Here link in the Sign In box. You will see the New Member Sign Up page. 3. Enter your [employer] Access Code exactly as it appears below. You will not need to use this code after youve completed the sign-up process. If you do not sign up before the expiration date, you must request a new code. · [employer] Access Code: [email or web address] Expires: 9/18/2018  1:08 PM 
 
4. Enter the last four digits of your Social Security Number (xxxx) and Date of Birth (mm/dd/yyyy) as indicated and click Submit. You will be taken to the next sign-up page. 5. Create a [employer] ID. This will be your [employer] login ID and cannot be changed, so think of one that is secure and easy to remember. 6. Create a [employer] password. You can change your password at any time. 7. Enter your Password Reset Question and Answer. This can be used at a later time if you forget your password. 8. Enter your e-mail address. You will receive e-mail notification when new information is available in [address] 9. Click Sign Up. You can now view and download portions of your medical record. 10. Click the Download Summary menu link to download a portable copy of your medical information. If you have questions, please visit the Frequently Asked Questions section of the [employer] website. Remember, [employer] is NOT to be used for urgent needs. For medical emergencies, dial 911. Now available from your iPhone and Android! Please provide this summary of care documentation to your next provider. Your primary care clinician is listed as Andres. If you have any questions after today's visit, please call [phone].

## 2018-06-21 NOTE — PROGRESS NOTES
Liver enzymes are a little bit higher although the our in between the range of what was done 1 month ago and 2 months ago I think to be absolutely sure about send a hepatitis B profile on her and also get an abdominal ultrasound because of elevated liver enzymes.

## 2018-06-25 ENCOUNTER — TELEPHONE (OUTPATIENT)
Dept: INTERNAL MEDICINE CLINIC | Age: 63
End: 2018-06-25

## 2018-06-25 DIAGNOSIS — R79.89 ELEVATED LFTS: Primary | ICD-10-CM

## 2018-06-25 NOTE — TELEPHONE ENCOUNTER
Patient informed.   Additional lab order sent to lab for hepatitis panel and abdominal ultrasound order generated for hospital.

## 2018-06-25 NOTE — TELEPHONE ENCOUNTER
----- Message from Jesus Alberto Alvarez MD sent at 6/21/2018  4:06 PM EDT -----  Liver enzymes are a little bit higher although the our in between the range of what was done 1 month ago and 2 months ago I think to be absolutely sure about send a hepatitis B profile on her and also get an abdominal ultrasound because of elevated liver enzymes.

## 2018-06-26 LAB
HAV IGM SERPL QL IA: NEGATIVE
HBV CORE IGM SERPL QL IA: NEGATIVE
HBV SURFACE AG SERPL QL IA: NEGATIVE
HCV AB S/CO SERPL IA: <0.1 S/CO RATIO (ref 0–0.9)

## 2018-06-29 ENCOUNTER — HOSPITAL ENCOUNTER (OUTPATIENT)
Dept: ULTRASOUND IMAGING | Age: 63
Discharge: HOME | End: 2018-06-29
Attending: INTERNAL MEDICINE
Payer: COMMERCIAL

## 2018-06-29 DIAGNOSIS — R79.89 ELEVATED LFTS: ICD-10-CM

## 2018-06-29 PROCEDURE — 76705 ECHO EXAM OF ABDOMEN: CPT

## 2018-07-03 ENCOUNTER — HOSPITAL ENCOUNTER (OUTPATIENT)
Dept: MRI IMAGING | Age: 63
Discharge: HOME | End: 2018-07-03
Attending: INTERNAL MEDICINE
Payer: COMMERCIAL

## 2018-07-03 ENCOUNTER — OFFICE VISIT (OUTPATIENT)
Dept: INTERNAL MEDICINE CLINIC | Age: 63
End: 2018-07-03

## 2018-07-03 VITALS
RESPIRATION RATE: 17 BRPM | DIASTOLIC BLOOD PRESSURE: 70 MMHG | SYSTOLIC BLOOD PRESSURE: 110 MMHG | HEIGHT: 59 IN | HEART RATE: 85 BPM | BODY MASS INDEX: 22.18 KG/M2 | OXYGEN SATURATION: 98 % | WEIGHT: 110 LBS

## 2018-07-03 DIAGNOSIS — K52.9 GASTROENTERITIS: ICD-10-CM

## 2018-07-03 DIAGNOSIS — K83.8 COMMON BILE DUCT DILATION: ICD-10-CM

## 2018-07-03 DIAGNOSIS — R74.8 ELEVATED LIVER ENZYMES: Primary | ICD-10-CM

## 2018-07-03 PROCEDURE — A9585 GADOBUTROL INJECTION: HCPCS | Performed by: INTERNAL MEDICINE

## 2018-07-03 PROCEDURE — 74011250636 HC RX REV CODE- 250/636: Performed by: INTERNAL MEDICINE

## 2018-07-03 PROCEDURE — 74183 MRI ABD W/O CNTR FLWD CNTR: CPT

## 2018-07-03 RX ADMIN — GADOBUTROL 6 ML: 604.72 INJECTION INTRAVENOUS at 19:10

## 2018-07-03 NOTE — PROGRESS NOTES
Nonspecific dilatation of common bile duct radiology recommends that we proceed with MRCP.   Discussed at visit.

## 2018-07-03 NOTE — PROGRESS NOTES
Chief Complaint   Patient presents with    Labs     go over labs      1. Have you been to the ER, urgent care clinic since your last visit? Hospitalized since your last visit? No    2. Have you seen or consulted any other health care providers outside of the [facility] since your last visit? Include any pap smears or colon screening.  No

## 2018-07-03 NOTE — PATIENT INSTRUCTIONS
Abdominal Pain: Care Instructions  Your Care Instructions    Abdominal pain has many possible causes. Some aren't serious and get better on their own in a few days. Others need more testing and treatment. If your pain continues or gets worse, you need to be rechecked and may need more tests to find out what is wrong. You may need surgery to correct the problem. Don't ignore new symptoms, such as fever, nausea and vomiting, urination problems, pain that gets worse, and dizziness. These may be signs of a more serious problem. Your doctor may have recommended a follow-up visit in the next 8 to 12 hours. If you are not getting better, you may need more tests or treatment. The doctor has checked you carefully, but problems can develop later. If you notice any problems or new symptoms, get medical treatment right away. Follow-up care is a key part of your treatment and safety. Be sure to make and go to all appointments, and call your doctor if you are having problems. It's also a good idea to know your test results and keep a list of the medicines you take. How can you care for yourself at home? · Rest until you feel better. · To prevent dehydration, drink plenty of fluids, enough so that your urine is light yellow or clear like water. Choose water and other caffeine-free clear liquids until you feel better. If you have kidney, heart, or liver disease and have to limit fluids, talk with your doctor before you increase the amount of fluids you drink. · If your stomach is upset, eat mild foods, such as rice, dry toast or crackers, bananas, and applesauce. Try eating several small meals instead of two or three large ones. · Wait until 48 hours after all symptoms have gone away before you have spicy foods, alcohol, and drinks that contain caffeine. · Do not eat foods that are high in fat. · Avoid anti-inflammatory medicines such as aspirin, ibuprofen (Advil, Motrin), and naproxen (Aleve).  These can cause stomach upset. Talk to your doctor if you take daily aspirin for another health problem. When should you call for help? Call 911 anytime you think you may need emergency care. For example, call if:  ? · You passed out (lost consciousness). ? · You pass maroon or very bloody stools. ? · You vomit blood or what looks like coffee grounds. ? · You have new, severe belly pain. ?Call your doctor now or seek immediate medical care if:  ? · Your pain gets worse, especially if it becomes focused in one area of your belly. ? · You have a new or higher fever. ? · Your stools are black and look like tar, or they have streaks of blood. ? · You have unexpected vaginal bleeding. ? · You have symptoms of a urinary tract infection. These may include:  ¨ Pain when you urinate. ¨ Urinating more often than usual.  ¨ Blood in your urine. ? · You are dizzy or lightheaded, or you feel like you may faint. ? Watch closely for changes in your health, and be sure to contact your doctor if:  ? · You are not getting better after 1 day (24 hours). Where can you learn more? Go to http://rose-velvet.info/. Enter G786 in the search box to learn more about \"Abdominal Pain: Care Instructions. \"  Current as of: March 20, 2017  Content Version: 11.4  © 0122-5143 [employer]. Care instructions adapted under license by [employer] (which disclaims liability or warranty for this information). If you have questions about a medical condition or this instruction, always ask your healthcare professional. [place] [address] any warranty or liability for your use of this information.

## 2018-07-03 NOTE — PROGRESS NOTES
Subjective:   Flor Ferraro is a 60 y.o. female      Chief Complaint   Patient presents with    Labs     go over labs         History of present illness: She presents today for evaluation of an abnormal abdominal ultrasound. She has been evaluated with elevated liver enzymes since the end of April of which they have been elevated on 3 occasions. Over the period of time all the workup is include hepatitis studies have been negative. Abdominal ultrasound is negative except there is some dilation of common bile duct at the pancreatic head. She does note a little bit of upper abdominal discomfort with the diarrhea issues that developed yesterday after going to swim me. The diarrhea has persisted although it is better now than it was earlier today. She notes a lot of rumbling in her stomach. She does also intermittently get some right upper abdominal pain that will come on and last for about 15 minutes and sometimes go up in her chest and then go away. She notes no nausea vomiting or other GI complaints.     Patient Active Problem List   Diagnosis Code    Bilateral carpal tunnel syndrome G56.03    Cervical radiculopathy due to degenerative joint disease of spine M47.22    Acquired hypothyroidism E03.9    Annual physical exam Z00.00    Age-related osteoporosis without current pathological fracture M81.0    Primary osteoarthritis involving multiple joints M15.0    Stage 3 chronic kidney disease N18.3    Mixed hyperlipidemia E78.2    Elevated liver enzymes R74.8    Common bile duct dilation K83.8    Gastroenteritis K52.9      Past Medical History:   Diagnosis Date    Back pain     Menopause     Neck pain       No Known Allergies   Family History   Problem Relation Age of Onset    Breast Cancer Paternal Grandmother      over 48    Stroke Mother     Heart Attack Father       Social History     Social History    Marital status:      Spouse name: N/A    Number of children: N/A    Years of education: N/A     Occupational History    Not on file. Social History Main Topics    Smoking status: Never Smoker    Smokeless tobacco: Never Used    Alcohol use Yes    Drug use: No    Sexual activity: Not Currently     Other Topics Concern    Not on file     Social History Narrative     Prior to Admission medications    Medication Sig Start Date End Date Taking? Authorizing Provider   alendronate (FOSAMAX) 70 mg tablet Take 70 mg by mouth. 5/18/17  Yes Historical Provider   sertraline (ZOLOFT) 50 mg tablet TAKE 1 TABLET(S) EVERY DAY BY ORAL ROUTE.100mg 4/2/18  Yes Historical Provider   diclofenac EC (VOLTAREN) 75 mg EC tablet TAKE 1 TABLET BY MOUTH TWICE A DAY 1/17/18  Yes Smita Parham MD   levothyroxine (SYNTHROID) 125 mcg tablet TAKE 1 TABLET BY MOUTH EVERY DAY 10/5/17  Yes Smita Parham MD        Review of Systems              Constitutional:  She denies fever, weight loss, sweats or fatigue. EYES: No blurred or double vision,               ENT: no nasal congestion, no headache or dizziness. No difficulty with               swallowing, taste, speech or smell. Respiratory:  No cough, wheezing or shortness of breath. No sputum production. Cardiac:  Denies chest pain, palpitations, unexplained indigestion, syncope, edema, PND or orthopnea. GI:  No bloating, anorexia, nausea, vomiting or heartburn. Positive abdominal pain is noted with some diarrhea  :  No frequency or dysuria. Denies incontinence or sexual dysfunction. Extremities:  No joint pain, stiffness or swelling  Back:.no pain or soreness  Skin:  No recent rashes or mole changes. Neurological:  No numbness, tingling, burning paresthesias or loss of motor strength. No syncope, dizziness, frequent headaches or memory loss.   Hematologic:  No easy bruising  Lymphatic: No lymph node enlargement    Objective:     Vitals:    07/03/18 1516   BP: 110/70   Pulse: 85   Resp: 17   SpO2: 98%   Weight: 110 lb (49.9 kg) Height: 4' 11\" (1.499 m)   PainSc:   0 - No pain       Body mass index is 22.22 kg/(m^2). Physical Examination:              General Appearance:  Well-developed, well-nourished, no acute distress. HEENT:      Ears:  The TMs and ear canals were clear. Eyes:  The pupillary responses were normal.  Extraocular muscle function intact. Lids and conjunctiva not injected. Funduscopic exam revealed sharp disc margins. Nares: Clear w/o edema or erythema  Pharynx:  Clear with teeth in good repair. No masses were noted. Neck:  Supple without thyromegaly or adenopathy. No JVD noted. No carotid                bruits. Lungs:  Clear to auscultation and percussion. Cardiac:  Regular rate and rhythm without murmur. PMI not displaced. No gallop, rub or click. Abdominal: Soft, with minimal diffuse tenderness with no hepata-spleenomegally or masses  Extremities:  No clubbing, cyanosis or edema. Skin:  No rash or unusual mole changes noted. Lymph Nodes:  None felt in the cervical, supraclavicular, axillary or inguinal region. Neurological: . DTRs 2+ and symmetric. Station and gait normal.   Hematologic:   No purpura or petechiae        Assessment/Plan:         1. Elevated liver enzymes    2. Common bile duct dilation    3. Gastroenteritis        Impressions/Plan:  Impression  1. Elevated liver enzymes we will see what they are on recheck after the MRCP  2. Dilated common bile duct MRCP to look at the pancreas and bile ducts scheduled  3. Gastroenteritis I think this is a viral I recommended fluids and over-the-counter Pepto-Bismol  Recheck after MRCP. Orders Placed This Encounter    MRI ABD W MRCP W WO CONT       Follow-up Disposition:  Return for TBD. No results found for any visits on 07/03/18. Elizabeth Sandoval MD    The patient was given after the visit summary the patient verbalized an understanding of the plans and problems as explained.

## 2018-07-05 NOTE — PROGRESS NOTES
MRI of abdomen is normal.  The liver enzyme elevation is probably just fatty infiltrate so continue to watch diet. We do not need to evaluate further other than continue to follow the liver enzymes.

## 2018-07-09 ENCOUNTER — TELEPHONE (OUTPATIENT)
Dept: INTERNAL MEDICINE CLINIC | Age: 63
End: 2018-07-09

## 2018-07-09 NOTE — TELEPHONE ENCOUNTER
----- Message from Мария Alfred MD sent at 7/5/2018 11:04 AM EDT -----  MRI of abdomen is normal.  The liver enzyme elevation is probably just fatty infiltrate so continue to watch diet. We do not need to evaluate further other than continue to follow the liver enzymes.

## 2018-09-26 ENCOUNTER — OFFICE VISIT (OUTPATIENT)
Dept: INTERNAL MEDICINE CLINIC | Age: 63
End: 2018-09-26

## 2018-09-26 VITALS
DIASTOLIC BLOOD PRESSURE: 58 MMHG | TEMPERATURE: 98.5 F | SYSTOLIC BLOOD PRESSURE: 98 MMHG | WEIGHT: 111.4 LBS | OXYGEN SATURATION: 97 % | HEIGHT: 59 IN | BODY MASS INDEX: 22.46 KG/M2 | RESPIRATION RATE: 18 BRPM | HEART RATE: 66 BPM

## 2018-09-26 DIAGNOSIS — E78.2 MIXED HYPERLIPIDEMIA: Primary | ICD-10-CM

## 2018-09-26 DIAGNOSIS — E03.9 ACQUIRED HYPOTHYROIDISM: ICD-10-CM

## 2018-09-26 DIAGNOSIS — Z23 ENCOUNTER FOR IMMUNIZATION: ICD-10-CM

## 2018-09-26 DIAGNOSIS — N18.30 STAGE 3 CHRONIC KIDNEY DISEASE (HCC): ICD-10-CM

## 2018-09-26 DIAGNOSIS — M15.9 PRIMARY OSTEOARTHRITIS INVOLVING MULTIPLE JOINTS: ICD-10-CM

## 2018-09-26 DIAGNOSIS — M81.0 AGE-RELATED OSTEOPOROSIS WITHOUT CURRENT PATHOLOGICAL FRACTURE: ICD-10-CM

## 2018-09-26 RX ORDER — BISMUTH SUBSALICYLATE 262 MG
1 TABLET,CHEWABLE ORAL DAILY
COMMUNITY

## 2018-09-26 NOTE — PROGRESS NOTES
Chief Complaint Patient presents with  Thyroid Problem 3 month follow up SUBJECTIVE: 
 
Danni Roca is a 60 y.o. female returns in follow-up for medical problems include hyperlipidemia, hypothyroidism, osteoporosis, CKD stage III, DJD and other medical problems. She notes she seems to be a little fatigued however when talking to her regarding her medicine she takes her Zoloft in the morning. Other than the fatigue she denies any other neurologic or general complaints. She denies any fevers or chills or night sweats. She denies any weight change. She denies any chest pain, shortness breath, palpitations or cardiorespiratory complaints. She denies any GI or  complaints. She denies any headaches, dizziness or other complaints in general.  There are no current arthritic complaints. There are no other complaints on complete review of systems. Current Outpatient Prescriptions Medication Sig Dispense Refill  multivitamin (ONE A DAY) tablet Take 1 Tab by mouth daily.  alendronate (FOSAMAX) 70 mg tablet Take 70 mg by mouth.  sertraline (ZOLOFT) 50 mg tablet TAKE 1 TABLET(S) EVERY DAY BY ORAL ROUTE.100mg  1  
 diclofenac EC (VOLTAREN) 75 mg EC tablet TAKE 1 TABLET BY MOUTH TWICE A DAY 60 Tab 5  levothyroxine (SYNTHROID) 125 mcg tablet TAKE 1 TABLET BY MOUTH EVERY DAY 30 Tab 11 Past Medical History:  
Diagnosis Date  Back pain  Menopause  Neck pain History reviewed. No pertinent surgical history. No Known Allergies REVIEW OF SYSTEMS: 
General: negative for - chills or fever, or weight loss or gain but some fatigue ENT: negative for - headaches, nasal congestion or tinnitus Eyes: no blurred or visual changes Neck: No stiffness or swollen nodes Respiratory: negative for - cough, hemoptysis, shortness of breath or wheezing Cardiovascular : negative for - chest pain, edema, palpitations or shortness of breath Gastrointestinal: negative for - abdominal pain, blood in stools, heartburn or nausea/vomiting Genito-Urinary: no dysuria, trouble voiding, or hematuria Musculoskeletal: negative for - gait disturbance, joint pain, joint stiffness or joint swelling Neurological: no TIA or stroke symptoms Hematologic: no bruises, no bleeding Lymphatic: no swollen glands Integument: no lumps, mole changes, nail changes or rash Endocrine:no malaise/lethargy poly uria or polydipsia or unexpected weight changes Social History Social History  Marital status:  Spouse name: N/A  
 Number of children: N/A  
 Years of education: N/A Social History Main Topics  Smoking status: Never Smoker  Smokeless tobacco: Never Used  Alcohol use Yes  Drug use: No  
 Sexual activity: Not Currently Other Topics Concern  None Social History Narrative Family History Problem Relation Age of Onset  Breast Cancer Paternal Grandmother   
  over 48  Stroke Mother  Heart Attack Father OBJECTIVE:  
 
Visit Vitals  BP 98/58 (BP 1 Location: Left arm, BP Patient Position: Sitting)  Pulse 66  Temp 98.5 °F (36.9 °C) (Oral)  Resp 18  Ht 4' 11\" (1.499 m)  Wt 111 lb 6.4 oz (50.5 kg)  SpO2 97%  BMI 22.5 kg/m2 CONSTITUTIONAL:   well nourished, appears age appropriate EYES: sclera anicteric, PERRL, EOMI 
ENMT:nares clear, moist mucous membranes, pharynx clear NECK: supple. Thyroid normal, No JVD or bruits RESPIRATORY: Chest: clear to ascultation and percussion, normal inspiratory effort CARDIOVASCULAR: Heart: regular rate and rhythm no murmurs, rubs or gallops, PMI not displaced, No thrills GASTROINTESTINAL: Abdomen: non distended, soft, non tender, bowel sounds normal 
HEMATOLOGIC: no purpura, petechiae or bruising LYMPHATIC: No lymph node enlargemant MUSCULOSKELETAL: Extremities: no edema or active synovitis, pulse 1+ INTEGUMENT: No unusual rashes or suspicious skin lesions noted. Nails appear normal. 
PERIPHERAL VASCULAR: normal pulses femoral, PT and DP NEUROLOGIC: non-focal exam, A & O X 3 PSYCHIATRIC:, appropriate affect ASSESSMENT:  
1. Mixed hyperlipidemia 2. Primary osteoarthritis involving multiple joints 3. Stage 3 chronic kidney disease 4. Age-related osteoporosis without current pathological fracture 5. Acquired hypothyroidism 6. Encounter for immunization Impression 1. Hyperlipidemia with last numbers reviewed and repeat status pending I will adjust if necessary. 2.  DJD that is stable 3. CKD stage III repeat status is pending 4. Osteoporosis bone density is done through [place] and she will get us a copy next time she gets one. 
5.  Hypothyroidism reviewed her last numbers which were good and I think fatigue is probably not related to this but probably related to the Zoloft 6. Fatigue we will asked her take her Zoloft at night and see if that makes a difference Flu shot given today. I will call with lab results and make further recommendations or adjustments if necessary. Follow-up as scheduled for 3 months or sooner should there be a problem. PLAN: 
. Orders Placed This Encounter  [place] IMMUNIZ ADMIN,1 SINGLE/COMB VAC/TOXOID  Influenza virus vaccine (QUADRIVALENT PRES FREE SYRINGE) IM (40732)  METABOLIC PANEL, COMPREHENSIVE  LIPID PANEL  multivitamin (ONE A DAY) tablet ATTENTION:  
This medical record was transcribed using an electronic medical records system. Although proofread, it may and can contain electronic and spelling errors. Other human spelling and other errors may be present. Corrections may be executed at a later time. Please feel free to contact us for any clarifications as needed. Follow-up Disposition: 
Return in about 3 months (around 12/26/2018). No results found for any visits on 09/26/18. Ricardo Spaulding MD 
 
 The patient verbalized understanding of the problems and plans as explained.

## 2018-09-26 NOTE — PATIENT INSTRUCTIONS
Arthritis: Care Instructions Your Care Instructions Arthritis, also called osteoarthritis, is a breakdown of the cartilage that cushions your joints. When the cartilage wears down, your bones rub against each other. This causes pain and stiffness. Many people have some arthritis as they age. Arthritis most often affects the joints of the spine, hands, hips, knees, or feet. You can take simple measures to protect your joints, ease your pain, and help you stay active. Follow-up care is a key part of your treatment and safety. Be sure to make and go to all appointments, and call your doctor if you are having problems. It's also a good idea to know your test results and keep a list of the medicines you take. How can you care for yourself at home? · Stay at a healthy weight. Being overweight puts extra strain on your joints. · Talk to your doctor or physical therapist about exercises that will help ease joint pain. ¨ Stretch. You may enjoy gentle forms of yoga to help keep your joints and muscles flexible. ¨ Walk instead of jog. Other types of exercise that are less stressful on the joints include riding a bicycle, swimming, ambrocio chi, or water exercise. ¨ Lift weights. Strong muscles help reduce stress on your joints. Stronger thigh muscles, for example, take some of the stress off of the knees and hips. Learn the right way to lift weights so you do not make joint pain worse. · Take your medicines exactly as prescribed. Call your doctor if you think you are having a problem with your medicine. · Take pain medicines exactly as directed. ¨ If the doctor gave you a prescription medicine for pain, take it as prescribed. ¨ If you are not taking a prescription pain medicine, ask your doctor if you can take an over-the-counter medicine. · Use a cane, crutch, walker, or another device if you need help to get around. These can help rest your joints.  You also can use other things to make life easier, such as a higher toilet seat and padded handles on kitchen utensils. · Do not sit in low chairs, which can make it hard to get up. · Put heat or cold on your sore joints as needed. Use whichever helps you most. You also can take turns with hot and cold packs. ¨ Apply heat 2 or 3 times a day for 20 to 30 minutes-using a heating pad, hot shower, or hot pack-to relieve pain and stiffness. ¨ Put ice or a cold pack on your sore joint for 10 to 20 minutes at a time. Put a thin cloth between the ice and your skin. When should you call for help? Call your doctor now or seek immediate medical care if: 
  · You have sudden swelling, warmth, or pain in any joint.  
  · You have joint pain and a fever or rash.  
  · You have such bad pain that you cannot use a joint.  
 Watch closely for changes in your health, and be sure to contact your doctor if: 
  · You have mild joint symptoms that continue even with more than 6 weeks of care at home.  
  · You have stomach pain or other problems with your medicine. Where can you learn more? Go to http://rose-velvet.info/. Enter B145 in the search box to learn more about \"Arthritis: Care Instructions. \" Current as of: October 10, 2017 Content Version: 11.7 © 9091-0345 [employer]. Care instructions adapted under license by [employer] (which disclaims liability or warranty for this information). If you have questions about a medical condition or this instruction, always ask your healthcare professional. [place] [address] any warranty or liability for your use of this information.

## 2018-09-26 NOTE — MR AVS SNAPSHOT
[facility] 
 
 
 [address] [id number] [phone] Patient: Shaila Knapp MRN: [id number] Visit Information Date & Time Provider Department Dept. Phone Encounter #  
 9/26/2018  9:40 AM Zahida Vincent [phone] [id number] Follow-up Instructions Return in about 3 months (around 12/26/2018). Your Appointments 4/18/2019  8:30 AM  
FOLLOW UP 10 with MD MAYTE Vincent [facility] ([address]) Appt Note: follow up yearly [address] [phone] [address] [facility] [id number] Upcoming Health Maintenance Date Due DTaP/Tdap/Td series (1 - Tdap) 10/7/1975 PAP AKA CERVICAL CYTOLOGY 10/7/1975 Shingrix Vaccine Age 50> (1 of 2) 10/7/2004 Influenza Age 5 to Adult 8/1/2018 BREAST CANCER SCRN MAMMOGRAM 4/17/2020 COLONOSCOPY 3/3/2021 Allergies as of 9/26/2018  Review Complete On: 9/26/2018 By: Gray Garrett MD  
 No Known Allergies Current Immunizations  Never Reviewed Name Date Influenza Vaccine (Quad) PF  Incomplete Not reviewed this visit You Were Diagnosed With   
  
 Codes Comments Mixed hyperlipidemia    -  Primary ICD-10-CM: O35.3 ICD-9-CM: 272.2 Primary osteoarthritis involving multiple joints     ICD-10-CM: M15.0 ICD-9-CM: 715.09 Stage 3 chronic kidney disease     ICD-10-CM: N18.3 ICD-9-CM: 780. 3 Age-related osteoporosis without current pathological fracture     ICD-10-CM: M81.0 ICD-9-CM: 733.01 Acquired hypothyroidism     ICD-10-CM: E03.9 ICD-9-CM: 244.9 Encounter for immunization     ICD-10-CM: G68 ICD-9-CM: V03.89 Vitals BP Pulse Temp Resp Height(growth percentile) Weight(growth percentile) 98/58 (BP 1 Location: Left arm, BP Patient Position: Sitting) 66 98.5 °F (36.9 °C) (Oral) 18 4' 11\" (1.499 m) 111 lb 6.4 oz (50.5 kg) SpO2 BMI OB Status Smoking Status 97% 22.5 kg/m2 Menopause Never Smoker Vitals History BMI and BSA Data Body Mass Index Body Surface Area  
 22.5 kg/m 2 1.45 m 2 Preferred Pharmacy Pharmacy Name Phone [phone] [facility] [phone] Your Updated Medication List  
  
   
This list is accurate as of 9/26/18 10:45 AM.  Always use your most recent med list.  
  
  
  
  
 alendronate 70 mg tablet Commonly known as:  FOSAMAX Take 70 mg by mouth. diclofenac EC 75 mg EC tablet Commonly known as:  VOLTAREN  
TAKE 1 TABLET BY MOUTH TWICE A DAY  
  
 levothyroxine 125 mcg tablet Commonly known as:  SYNTHROID  
TAKE 1 TABLET BY MOUTH EVERY DAY  
  
 multivitamin tablet Commonly known as:  ONE A DAY Take 1 Tab by mouth daily. sertraline 50 mg tablet Commonly known as:  ZOLOFT  
TAKE 1 TABLET(S) EVERY DAY BY ORAL [place] Fljoshua We Performed the Following INFLUENZA VIRUS VAC QUAD,SPLIT,PRESV FREE SYRINGE IM [id number] CPT(R)] LIPID PANEL [90941 CPT(R)] METABOLIC PANEL, COMPREHENSIVE [01195 CPT(R)] [place] IMMUNIZ ADMIN,1 SINGLE/COMB VAC/TOXOID [id number] CPT(R)] Follow-up Instructions Return in about 3 months (around 12/26/2018). Patient Instructions Arthritis: Care Instructions Your Care Instructions Arthritis, also called osteoarthritis, is a breakdown of the cartilage that cushions your joints. When the cartilage wears down, your bones rub against each other. This causes pain and stiffness. Many people have some arthritis as they age. Arthritis most often affects the joints of the spine, hands, hips, knees, or feet.  
You can take simple measures to protect your joints, ease your pain, and help you stay active. Follow-up care is a key part of your treatment and safety. Be sure to make and go to all appointments, and call your doctor if you are having problems. It's also a good idea to know your test results and keep a list of the medicines you take. How can you care for yourself at home? · Stay at a healthy weight. Being overweight puts extra strain on your joints. · Talk to your doctor or physical therapist about exercises that will help ease joint pain. ¨ Stretch. You may enjoy gentle forms of yoga to help keep your joints and muscles flexible. ¨ Walk instead of jog. Other types of exercise that are less stressful on the joints include riding a bicycle, swimming, ambrocio chi, or water exercise. ¨ Lift weights. Strong muscles help reduce stress on your joints. Stronger thigh muscles, for example, take some of the stress off of the knees and hips. Learn the right way to lift weights so you do not make joint pain worse. · Take your medicines exactly as prescribed. Call your doctor if you think you are having a problem with your medicine. · Take pain medicines exactly as directed. ¨ If the doctor gave you a prescription medicine for pain, take it as prescribed. ¨ If you are not taking a prescription pain medicine, ask your doctor if you can take an over-the-counter medicine. · Use a cane, crutch, walker, or another device if you need help to get around. These can help rest your joints. You also can use other things to make life easier, such as a higher toilet seat and padded handles on kitchen utensils. · Do not sit in low chairs, which can make it hard to get up. · Put heat or cold on your sore joints as needed. Use whichever helps you most. You also can take turns with hot and cold packs. ¨ Apply heat 2 or 3 times a day for 20 to 30 minutes-using a heating pad, hot shower, or hot pack-to relieve pain and stiffness.  
¨ Put ice or a cold pack on your sore joint for 10 to 20 minutes at a time. Put a thin cloth between the ice and your skin. When should you call for help? Call your doctor now or seek immediate medical care if: 
  · You have sudden swelling, warmth, or pain in any joint.  
  · You have joint pain and a fever or rash.  
  · You have such bad pain that you cannot use a joint.  
 Watch closely for changes in your health, and be sure to contact your doctor if: 
  · You have mild joint symptoms that continue even with more than 6 weeks of care at home.  
  · You have stomach pain or other problems with your medicine. Where can you learn more? Go to http://rose-velvet.info/. Enter A716 in the search box to learn more about \"Arthritis: Care Instructions. \" Current as of: October 10, 2017 Content Version: 11.7 © 1419-8001 [employer]. Care instructions adapted under license by [facility] (which disclaims liability or warranty for this information). If you have questions about a medical condition or this instruction, always ask your healthcare professional. [address] any warranty or liability for your use of this information. Introducing [facility]! Marco Antonio Sexton introduces Spiralcat patient portal. Now you can access parts of your medical record, email your doctor's office, and request medication refills online. 1. In your internet browser, go to [email or web address] 2. Click on the First Time User? Click Here link in the Sign In box. You will see the New Member Sign Up page. 3. Enter your Spiralcat Access Code exactly as it appears below. You will not need to use this code after youve completed the sign-up process. If you do not sign up before the expiration date, you must request a new code. · Spiralcat Access Code: [email or web address] Expires: 12/25/2018  9:36 AM 
 
4.  Enter the last four digits of your Social Security Number (xxxx) and Date of Birth (mm/dd/yyyy) as indicated and click Submit. You will be taken to the next sign-up page. 5. Create a [employer] ID. This will be your [employer] login ID and cannot be changed, so think of one that is secure and easy to remember. 6. Create a [employer] password. You can change your password at any time. 7. Enter your Password Reset Question and Answer. This can be used at a later time if you forget your password. 8. Enter your e-mail address. You will receive e-mail notification when new information is available in [address] 9. Click Sign Up. You can now view and download portions of your medical record. 10. Click the Download Summary menu link to download a portable copy of your medical information. If you have questions, please visit the Frequently Asked Questions section of the [employer] website. Remember, [employer] is NOT to be used for urgent needs. For medical emergencies, dial 911. Now available from your iPhone and Android! Please provide this summary of care documentation to your next provider. Your primary care clinician is listed as Andres. If you have any questions after today's visit, please call [phone].

## 2018-09-26 NOTE — PROGRESS NOTES
Chief Complaint Patient presents with  Thyroid Problem 3 month follow up 1. Have you been to the ER, urgent care clinic since your last visit? No   Hospitalized since your last visit? No   
 
2. Have you seen or consulted any other health care providers outside of the [address] [place] [address] since your last visit? No   
 
Patient wants the flu vaccine Patient received flu vaccine in her left deltoid, per verbal order from Dr. Marcia Eng Patient tolerated well. No complaints at this time. No swelling or redness at vaccine site at this time.

## 2018-09-27 LAB
ALBUMIN SERPL-MCNC: 4.6 G/DL (ref 3.6–4.8)
ALBUMIN/GLOB SERPL: 1.8 {RATIO} (ref 1.2–2.2)
ALP SERPL-CCNC: 60 IU/L (ref 39–117)
ALT SERPL-CCNC: 27 IU/L (ref 0–32)
AST SERPL-CCNC: 32 IU/L (ref 0–40)
BILIRUB SERPL-MCNC: 0.4 MG/DL (ref 0–1.2)
BUN SERPL-MCNC: 21 MG/DL (ref 8–27)
BUN/CREAT SERPL: 24 (ref 12–28)
CALCIUM SERPL-MCNC: 9.6 MG/DL (ref 8.7–10.3)
CHLORIDE SERPL-SCNC: 102 MMOL/L (ref 96–106)
CHOLEST SERPL-MCNC: 252 MG/DL (ref 100–199)
CO2 SERPL-SCNC: 25 MMOL/L (ref 20–29)
CREAT SERPL-MCNC: 0.86 MG/DL (ref 0.57–1)
GLOBULIN SER CALC-MCNC: 2.6 G/DL (ref 1.5–4.5)
GLUCOSE SERPL-MCNC: 97 MG/DL (ref 65–99)
HDLC SERPL-MCNC: 90 MG/DL
LDLC SERPL CALC-MCNC: 144 MG/DL (ref 0–99)
POTASSIUM SERPL-SCNC: 4.7 MMOL/L (ref 3.5–5.2)
PROT SERPL-MCNC: 7.2 G/DL (ref 6–8.5)
SODIUM SERPL-SCNC: 141 MMOL/L (ref 134–144)
TRIGL SERPL-MCNC: 89 MG/DL (ref 0–149)
VLDLC SERPL CALC-MCNC: 18 MG/DL (ref 5–40)

## 2018-10-08 DIAGNOSIS — E03.9 ACQUIRED HYPOTHYROIDISM: ICD-10-CM

## 2018-10-08 RX ORDER — LEVOTHYROXINE SODIUM 125 UG/1
TABLET ORAL
Qty: 90 TAB | Refills: 3 | Status: SHIPPED | OUTPATIENT
Start: 2018-10-08 | End: 2019-07-02 | Stop reason: SDUPTHER

## 2018-10-08 NOTE — TELEPHONE ENCOUNTER
Requested Prescriptions     Pending Prescriptions Disp Refills    levothyroxine (SYNTHROID) 125 mcg tablet [Pharmacy Med Name: LEVOTHYROXINE 125 MCG TABLET] 90 Tab 3     Sig: TAKE 1 TABLET BY MOUTH EVERY DAY     RX refill request from the patient/pharmacy. Patient last seen 09- with labs, and next appt. scheduled for 01-.

## 2019-01-09 NOTE — PROGRESS NOTES
Chief Complaint Patient presents with  Chronic Kidney Disease 3 month follow up SUBJECTIVE: 
 
Randy Bernal is a 58 y.o. female who returns today in follow-up of her medical problems include hyperlipidemia, CKD stage III, DJD, hypothyroidism, and she had a prior history of cervical radiculopathy and previous was evaluated by an orthopedist and neurosurgeon before going to a chiropractor a few years ago and that seemed to take care of that problem. She has some recurrent pain now in her neck and she is 1 if we need to evaluate that further she has no history of falls or trauma. She currently denies any chest pain, shortness breath, palpitations, PND, orthopnea or cardiorespiratory complaints. She denies any GI or  complaints. She denies any headaches, dizziness or neurologic complaints except for the pain from the neck radiating to the right shoulder but not actually into the shoulder or arm. There are no other arthritic complaints and no other complaints on complete review of systems. Current Outpatient Medications Medication Sig Dispense Refill  levothyroxine (SYNTHROID) 125 mcg tablet TAKE 1 TABLET BY MOUTH EVERY DAY 90 Tab 3  
 multivitamin (ONE A DAY) tablet Take 1 Tab by mouth daily.  alendronate (FOSAMAX) 70 mg tablet Take 70 mg by mouth.  sertraline (ZOLOFT) 50 mg tablet TAKE 1 TABLET(S) EVERY DAY BY ORAL ROUTE.100mg  1 Past Medical History:  
Diagnosis Date  Back pain  Menopause  Neck pain History reviewed. No pertinent surgical history. No Known Allergies REVIEW OF SYSTEMS: 
General: negative for - chills or fever, or weight loss or gain ENT: negative for - headaches, nasal congestion or tinnitus Eyes: no blurred or visual changes Neck: No stiffness or swollen nodes. Some pain with some radiation toward the right trapezial region with some tingling and burning Respiratory: negative for - cough, hemoptysis, shortness of breath or wheezing Cardiovascular : negative for - chest pain, edema, palpitations or shortness of breath Gastrointestinal: negative for - abdominal pain, blood in stools, heartburn or nausea/vomiting Genito-Urinary: no dysuria, trouble voiding, or hematuria Musculoskeletal: negative for - gait disturbance, joint pain, joint stiffness or joint swelling Neurological: no TIA or stroke symptoms Hematologic: no bruises, no bleeding Lymphatic: no swollen glands Integument: no lumps, mole changes, nail changes or rash Endocrine:no malaise/lethargy poly uria or polydipsia or unexpected weight changes Social History Socioeconomic History  Marital status:  Spouse name: Not on file  Number of children: Not on file  Years of education: Not on file  Highest education level: Not on file Tobacco Use  Smoking status: Never Smoker  Smokeless tobacco: Never Used Substance and Sexual Activity  Alcohol use: Yes  Drug use: No  
 Sexual activity: Not Currently Family History Problem Relation Age of Onset  Breast Cancer Paternal Grandmother   
     over 48  Stroke Mother  Heart Attack Father OBJECTIVE:  
 
Visit Vitals BP 94/68 Pulse 85 Resp 18 Ht 4' 11\" (1.499 m) Wt 114 lb 9.6 oz (52 kg) SpO2 97% BMI 23.15 kg/m² CONSTITUTIONAL:   well nourished, appears age appropriate EYES: sclera anicteric, PERRL, EOMI 
ENMT:nares clear, moist mucous membranes, pharynx clear NECK: supple. Thyroid normal, No JVD or bruits. Mild discomfort right trapezial ridge RESPIRATORY: Chest: clear to ascultation and percussion, normal inspiratory effort CARDIOVASCULAR: Heart: regular rate and rhythm no murmurs, rubs or gallops, PMI not displaced, No thrills GASTROINTESTINAL: Abdomen: non distended, soft, non tender, bowel sounds normal 
HEMATOLOGIC: no purpura, petechiae or bruising LYMPHATIC: No lymph node enlargemant MUSCULOSKELETAL: Extremities: no edema or active synovitis, pulse 1+ INTEGUMENT: No unusual rashes or suspicious skin lesions noted. Nails appear normal. 
PERIPHERAL VASCULAR: normal pulses femoral, PT and DP NEUROLOGIC: non-focal exam, A & O X 3 PSYCHIATRIC:, appropriate affect ASSESSMENT:  
1. Mixed hyperlipidemia 2. Primary osteoarthritis involving multiple joints 3. Stage 3 chronic kidney disease ([address] 4. Age-related osteoporosis without current pathological fracture 5. Acquired hypothyroidism 6. Cervical radiculopathy due to degenerative joint disease of spine Impression 1. Hyperlipidemia we will see what that status is and make adjustments if necessary. 2.  DJD stable except for the neck as noted 3. CKD stage III repeat status pending 4. Osteoporosis reviewed prior bone density 5. Hypothyroidism recent numbers were normal 
6. Recurrent cervical radiculopathy repeat cervical spine from today reveals significant arthritic changes and multilevel. I am going to schedule MRI cervical spine to further evaluate this since she did have cervical radiculopathy about 5 years ago and I think this probably has progressed. I will call with lab results and make further recommendations or adjustments if necessary. I will also call the MRI results and make recommendations. Follow-up tentatively set up in 3 months or sooner should to be a problem. PLAN: 
. Orders Placed This Encounter  XR SPINE CERV PA LAT ODONT 3 V MAX  MRI CERV SPINE WO CONT  METABOLIC PANEL, COMPREHENSIVE  LIPID PANEL  
 
 
 
ATTENTION:  
This medical record was transcribed using an electronic medical records system. Although proofread, it may and can contain electronic and spelling errors. Other human spelling and other errors may be present. Corrections may be executed at a later time. Please feel free to contact us for any clarifications as needed. Follow-up Disposition: Return in about 3 months (around 4/10/2019). Results for orders placed or performed in visit on 01/10/19 XR SPINE CERV PA LAT ODONT 3 V MAX Narrative Study: XR SPINE CERV PA LAT ODONT 3 V MAX Indication:  Neck pain rad to R shoulder Additional clinical history: 
 
Comparison: MRI 6/4/2008. Kira Mckee Findings: 
AP, lateral, and odontoid views of the cervical spine were obtained. There is 
about 1 mm anterior subluxation C3 on C4 and 1 to 2 mm posterior subluxation C5 
on C6. Severe disc space narrowing is present at C4-C5. Moderate to space 
narrowing is present at C3-C4 and C5-C6. Osteophytic endplate changes are noted 
at multiple levels. Multilevel facet arthropathy and uncovertebral joint 
hypertrophy is also noted. No abnormality seen in the visualized soft tissues. Impression Impression: 
Multilevel degenerative disc disease and degenerative changes. Angel Mata MD 
 
The patient verbalized understanding of the problems and plans as explained.

## 2019-01-10 ENCOUNTER — OFFICE VISIT (OUTPATIENT)
Dept: INTERNAL MEDICINE CLINIC | Age: 64
End: 2019-01-10

## 2019-01-10 VITALS
BODY MASS INDEX: 23.1 KG/M2 | SYSTOLIC BLOOD PRESSURE: 94 MMHG | OXYGEN SATURATION: 97 % | HEART RATE: 85 BPM | HEIGHT: 59 IN | WEIGHT: 114.6 LBS | RESPIRATION RATE: 18 BRPM | DIASTOLIC BLOOD PRESSURE: 68 MMHG

## 2019-01-10 DIAGNOSIS — N18.30 STAGE 3 CHRONIC KIDNEY DISEASE (HCC): ICD-10-CM

## 2019-01-10 DIAGNOSIS — E78.2 MIXED HYPERLIPIDEMIA: Primary | ICD-10-CM

## 2019-01-10 DIAGNOSIS — E03.9 ACQUIRED HYPOTHYROIDISM: ICD-10-CM

## 2019-01-10 DIAGNOSIS — M47.22 CERVICAL RADICULOPATHY DUE TO DEGENERATIVE JOINT DISEASE OF SPINE: ICD-10-CM

## 2019-01-10 DIAGNOSIS — M15.9 PRIMARY OSTEOARTHRITIS INVOLVING MULTIPLE JOINTS: ICD-10-CM

## 2019-01-10 DIAGNOSIS — M81.0 AGE-RELATED OSTEOPOROSIS WITHOUT CURRENT PATHOLOGICAL FRACTURE: ICD-10-CM

## 2019-01-10 NOTE — PROGRESS NOTES
Chief Complaint Patient presents with  Chronic Kidney Disease 3 month follow up 1. Have you been to the ER, urgent care clinic since your last visit? Hospitalized since your last visit? No 
 
2. Have you seen or consulted any other health care providers outside of the [address] [place] [address] since your last visit? Include any pap smears or colon screening. Yes, [facility] in December 2018 for UTI. Visit Vitals BP (!) 60/60 (BP 1 Location: Left arm, BP Patient Position: Sitting) Pulse 85 Resp 18 Ht 4' 11\" (1.499 m) Wt 114 lb 9.6 oz (52 kg) SpO2 97% BMI 23.15 kg/m²

## 2019-01-10 NOTE — PATIENT INSTRUCTIONS
Arthritis: Care Instructions Your Care Instructions Arthritis, also called osteoarthritis, is a breakdown of the cartilage that cushions your joints. When the cartilage wears down, your bones rub against each other. This causes pain and stiffness. Many people have some arthritis as they age. Arthritis most often affects the joints of the spine, hands, hips, knees, or feet. You can take simple measures to protect your joints, ease your pain, and help you stay active. Follow-up care is a key part of your treatment and safety. Be sure to make and go to all appointments, and call your doctor if you are having problems. It's also a good idea to know your test results and keep a list of the medicines you take. How can you care for yourself at home? · Stay at a healthy weight. Being overweight puts extra strain on your joints. · Talk to your doctor or physical therapist about exercises that will help ease joint pain. ? Stretch. You may enjoy gentle forms of yoga to help keep your joints and muscles flexible. ? Walk instead of jog. Other types of exercise that are less stressful on the joints include riding a bicycle, swimming, ambrocio chi, or water exercise. ? Lift weights. Strong muscles help reduce stress on your joints. Stronger thigh muscles, for example, take some of the stress off of the knees and hips. Learn the right way to lift weights so you do not make joint pain worse. · Take your medicines exactly as prescribed. Call your doctor if you think you are having a problem with your medicine. · Take pain medicines exactly as directed. ? If the doctor gave you a prescription medicine for pain, take it as prescribed. ? If you are not taking a prescription pain medicine, ask your doctor if you can take an over-the-counter medicine. · Use a cane, crutch, walker, or another device if you need help to get around. These can help rest your joints.  You also can use other things to make life easier, such as a higher toilet seat and padded handles on kitchen utensils. · Do not sit in low chairs, which can make it hard to get up. · Put heat or cold on your sore joints as needed. Use whichever helps you most. You also can take turns with hot and cold packs. ? Apply heat 2 or 3 times a day for 20 to 30 minutesusing a heating pad, hot shower, or hot packto relieve pain and stiffness. ? Put ice or a cold pack on your sore joint for 10 to 20 minutes at a time. Put a thin cloth between the ice and your skin. When should you call for help? Call your doctor now or seek immediate medical care if: 
  · You have sudden swelling, warmth, or pain in any joint.  
  · You have joint pain and a fever or rash.  
  · You have such bad pain that you cannot use a joint.  
 Watch closely for changes in your health, and be sure to contact your doctor if: 
  · You have mild joint symptoms that continue even with more than 6 weeks of care at home.  
  · You have stomach pain or other problems with your medicine. Where can you learn more? Go to http://rose-velvet.info/. Enter Q941 in the search box to learn more about \"Arthritis: Care Instructions. \" Current as of: June 11, 2018 Content Version: 11.8 © 3894-9872 [employer]. Care instructions adapted under license by [employer] (which disclaims liability or warranty for this information). If you have questions about a medical condition or this instruction, always ask your healthcare professional. [place] [address] any warranty or liability for your use of this information.

## 2019-01-11 LAB
ALBUMIN SERPL-MCNC: 4.7 G/DL (ref 3.6–4.8)
ALBUMIN/GLOB SERPL: 1.8 {RATIO} (ref 1.2–2.2)
ALP SERPL-CCNC: 56 IU/L (ref 39–117)
ALT SERPL-CCNC: 42 IU/L (ref 0–32)
AST SERPL-CCNC: 41 IU/L (ref 0–40)
BILIRUB SERPL-MCNC: 0.4 MG/DL (ref 0–1.2)
BUN SERPL-MCNC: 23 MG/DL (ref 8–27)
BUN/CREAT SERPL: 25 (ref 12–28)
CALCIUM SERPL-MCNC: 9.8 MG/DL (ref 8.7–10.3)
CHLORIDE SERPL-SCNC: 104 MMOL/L (ref 96–106)
CHOLEST SERPL-MCNC: 295 MG/DL (ref 100–199)
CO2 SERPL-SCNC: 22 MMOL/L (ref 20–29)
CREAT SERPL-MCNC: 0.93 MG/DL (ref 0.57–1)
GLOBULIN SER CALC-MCNC: 2.6 G/DL (ref 1.5–4.5)
GLUCOSE SERPL-MCNC: 103 MG/DL (ref 65–99)
HDLC SERPL-MCNC: 96 MG/DL
LDLC SERPL CALC-MCNC: 179 MG/DL (ref 0–99)
POTASSIUM SERPL-SCNC: 4.8 MMOL/L (ref 3.5–5.2)
PROT SERPL-MCNC: 7.3 G/DL (ref 6–8.5)
SODIUM SERPL-SCNC: 145 MMOL/L (ref 134–144)
TRIGL SERPL-MCNC: 100 MG/DL (ref 0–149)
VLDLC SERPL CALC-MCNC: 20 MG/DL (ref 5–40)

## 2019-01-11 NOTE — PROGRESS NOTES
Very high cholesterol and LDL so even though HDL is good I would recommend we start Lipitor 10 mg daily. LFTs are slightly up which could be fatty infiltrate of liver. We will recheck those when she returns in 3 months to make sure she is a 3-month follow-up schedule.

## 2019-01-14 ENCOUNTER — TELEPHONE (OUTPATIENT)
Dept: INTERNAL MEDICINE CLINIC | Age: 64
End: 2019-01-14

## 2019-01-14 RX ORDER — ATORVASTATIN CALCIUM 10 MG/1
10 TABLET, FILM COATED ORAL DAILY
Qty: 90 TAB | Refills: 3 | Status: SHIPPED | OUTPATIENT
Start: 2019-01-14 | End: 2019-11-12 | Stop reason: SDUPTHER

## 2019-01-14 NOTE — TELEPHONE ENCOUNTER
----- Message from Haylie Lagunas MD sent at 1/11/2019  6:16 PM EST -----  Very high cholesterol and LDL so even though HDL is good I would recommend we start Lipitor 10 mg daily. LFTs are slightly up which could be fatty infiltrate of liver. We will recheck those when she returns in 3 months to make sure she is a 3-month follow-up schedule.

## 2019-01-14 NOTE — TELEPHONE ENCOUNTER
RX refill request from the patient/pharmacy. Patient last seen 01- with labs, and next appt. scheduled for 04-. \  Requested Prescriptions     Pending Prescriptions Disp Refills    atorvastatin (LIPITOR) 10 mg tablet 90 Tab 3     Sig: Take 1 Tab by mouth daily.

## 2019-01-21 ENCOUNTER — HOSPITAL ENCOUNTER (OUTPATIENT)
Dept: MRI IMAGING | Age: 64
Discharge: HOME | End: 2019-01-21
Attending: INTERNAL MEDICINE
Payer: COMMERCIAL

## 2019-01-21 DIAGNOSIS — M47.22 CERVICAL RADICULOPATHY DUE TO DEGENERATIVE JOINT DISEASE OF SPINE: ICD-10-CM

## 2019-01-21 PROBLEM — M48.02 CERVICAL SPINAL STENOSIS: Status: ACTIVE | Noted: 2019-01-21

## 2019-01-21 PROBLEM — M50.30 DEGENERATIVE DISC DISEASE, CERVICAL: Status: ACTIVE | Noted: 2019-01-21

## 2019-01-21 PROCEDURE — 72141 MRI NECK SPINE W/O DYE: CPT

## 2019-01-22 ENCOUNTER — OFFICE VISIT (OUTPATIENT)
Dept: INTERNAL MEDICINE CLINIC | Age: 64
End: 2019-01-22

## 2019-01-22 VITALS
SYSTOLIC BLOOD PRESSURE: 108 MMHG | HEART RATE: 69 BPM | RESPIRATION RATE: 20 BRPM | WEIGHT: 114 LBS | OXYGEN SATURATION: 97 % | DIASTOLIC BLOOD PRESSURE: 70 MMHG | BODY MASS INDEX: 22.98 KG/M2 | HEIGHT: 59 IN | TEMPERATURE: 97.6 F

## 2019-01-22 DIAGNOSIS — M47.22 CERVICAL RADICULOPATHY DUE TO DEGENERATIVE JOINT DISEASE OF SPINE: ICD-10-CM

## 2019-01-22 DIAGNOSIS — M48.02 CERVICAL SPINAL STENOSIS: Primary | ICD-10-CM

## 2019-01-22 NOTE — PATIENT INSTRUCTIONS
Cervical Disc Disease: Care Instructions Your Care Instructions Cervical disc disease results from damage, disease, or wear and tear to the discs between the bones (vertebra) in your neck. The discs act as shock absorbers for the spine and keep the spine flexible. When a disc is damaged, it can bulge out and press against the nerve roots or spinal cord. This is sometimes called a herniated or \"slipped disc. \" This pressure can cause pain and numbness or tingling in your arms and hands. It can also cause weakness in your legs. An accident can damage a disc and cause it to break open (rupture). Aging and hard physical work can also cause damage to cervical discs. The first treatments for cervical disc disease include physical therapy, special neck exercises, heat, and pain medicine. If these fail, your doctor may inject steroids and pain medicine into your neck. Surgery is usually done only if other treatments have not worked. Follow-up care is a key part of your treatment and safety. Be sure to make and go to all appointments, and call your doctor if you are having problems. It's also a good idea to know your test results and keep a list of the medicines you take. How can you care for yourself at home? · Take pain medicines exactly as directed. ? If the doctor gave you a prescription medicine for pain, take it as prescribed. ? If you are not taking a prescription pain medicine, ask your doctor if you can take an over-the-counter medicine. · Don't spend too long in one position. Take short breaks to move around and change positions. · Wear a seat belt and shoulder harness when you are in a car. · Sleep with a pillow under your head and neck that keeps your neck straight. · Follow your doctor's instructions for gentle neck-stretching exercises. · Do not smoke. Smoking can slow healing of your discs. If you need help quitting, talk to your doctor about stop-smoking programs and medicines. These can increase your chances of quitting for good. · Avoid strenuous work or exercise until your doctor says it is okay. When should you call for help? Call 911 anytime you think you may need emergency care. For example, call if: 
  · You are unable to move an arm or a leg at all.  
[facility] your doctor now or seek immediate medical care if: 
  · You have new or worse symptoms in your arms, legs, belly, or buttocks. Symptoms may include: 
? Numbness or tingling. ? Weakness. ? Pain.  
  · You lose bladder or bowel control.  
 Watch closely for changes in your health, and be sure to contact your doctor if: 
  · You do not get better as expected. Where can you learn more? Go to http://rose-velvet.info/. Enter N118 in the search box to learn more about \"Cervical Disc Disease: Care Instructions. \" Current as of: September 20, 2018 Content Version: 11.9 © 2368-7254 [employer]. Care instructions adapted under license by [employer] (which disclaims liability or warranty for this information). If you have questions about a medical condition or this instruction, always ask your healthcare professional. [address] any warranty or liability for your use of this information.

## 2019-01-22 NOTE — PROGRESS NOTES
Chief Complaint Patient presents with  Results  
  discuss MRI results SUBJECTIVE: 
 
Laura Richardson is a 58 y.o. female who returns in follow-up for her continued neck pain which seems to radiate toward her right side. She has not noted any improvement and she did have an MRI since she was last seen here. She denies any numbness or weakness of her hand but does have the pain that radiates over to the right trapezial region. Current Outpatient Medications Medication Sig Dispense Refill  atorvastatin (LIPITOR) 10 mg tablet Take 1 Tab by mouth daily. 90 Tab 3  
 levothyroxine (SYNTHROID) 125 mcg tablet TAKE 1 TABLET BY MOUTH EVERY DAY 90 Tab 3  
 multivitamin (ONE A DAY) tablet Take 1 Tab by mouth daily.  alendronate (FOSAMAX) 70 mg tablet Take 70 mg by mouth.  sertraline (ZOLOFT) 50 mg tablet TAKE 1 TABLET(S) EVERY DAY BY ORAL ROUTE.100mg  1 Past Medical History:  
Diagnosis Date  Back pain  Menopause  Neck pain History reviewed. No pertinent surgical history. No Known Allergies REVIEW OF SYSTEMS: 
General: negative for - chills or fever, or weight loss or gain ENT: negative for - headaches, nasal congestion or tinnitus Eyes: no blurred or visual changes Neck: No stiffness or swollen nodes. Pain is noted radiating to right Respiratory: negative for - cough, hemoptysis, shortness of breath or wheezing Cardiovascular : negative for - chest pain, edema, palpitations or shortness of breath Gastrointestinal: negative for - abdominal pain, blood in stools, heartburn or nausea/vomiting Genito-Urinary: no dysuria, trouble voiding, or hematuria Musculoskeletal: negative for - gait disturbance, joint pain, joint stiffness or joint swelling Neurological: no TIA or stroke symptoms Hematologic: no bruises, no bleeding Lymphatic: no swollen glands Integument: no lumps, mole changes, nail changes or rash Endocrine:no malaise/lethargy poly uria or polydipsia or unexpected weight changes Social History Socioeconomic History  Marital status:  Spouse name: Not on file  Number of children: Not on file  Years of education: Not on file  Highest education level: Not on file Tobacco Use  Smoking status: Never Smoker  Smokeless tobacco: Never Used Substance and Sexual Activity  Alcohol use: Yes  Drug use: No  
 Sexual activity: Not Currently Family History Problem Relation Age of Onset  Breast Cancer Paternal Grandmother   
     over 48  Stroke Mother  Heart Attack Father OBJECTIVE:  
 
Visit Vitals /70 (BP 1 Location: Left arm, BP Patient Position: Sitting) Pulse 69 Temp 97.6 °F (36.4 °C) (Oral) Resp 20 Ht 4' 11\" (1.499 m) Wt 114 lb (51.7 kg) SpO2 97% BMI 23.03 kg/m² CONSTITUTIONAL:   well nourished, appears age appropriate EYES: sclera anicteric, PERRL, EOMI 
ENMT:nares clear, moist mucous membranes, pharynx clear NECK: supple. Thyroid normal, No JVD or bruits. Right paraspinal tenderness cervical region RESPIRATORY: Chest: clear to ascultation and percussion, normal inspiratory effort CARDIOVASCULAR: Heart: regular rate and rhythm no murmurs, rubs or gallops, PMI not displaced, No thrills GASTROINTESTINAL: Abdomen: non distended, soft, non tender, bowel sounds normal 
HEMATOLOGIC: no purpura, petechiae or bruising LYMPHATIC: No lymph node enlargemant MUSCULOSKELETAL: Extremities: no edema or active synovitis, pulse 1+ INTEGUMENT: No unusual rashes or suspicious skin lesions noted. Nails appear normal. 
PERIPHERAL VASCULAR: normal pulses femoral, PT and DP NEUROLOGIC: non-focal exam, A & O X 3 PSYCHIATRIC:, appropriate affect ASSESSMENT:  
1. Cervical spinal stenosis 2. Cervical radiculopathy due to degenerative joint disease of spine Impression 1.  Cervical spinal stenosis mild to moderate with cervical radiculopathy predominantly on the right and symptoms consistent with that I will send her for neurosurgical evaluation Recheck her with me regarding her other medical problems as previously scheduled or sooner if there is a problem. PLAN: 
. Orders Placed This Encounter  REFERRAL TO NEUROSURGERY  
 
 
 
ATTENTION:  
This medical record was transcribed using an electronic medical records system. Although proofread, it may and can contain electronic and spelling errors. Other human spelling and other errors may be present. Corrections may be executed at a later time. Please feel free to contact us for any clarifications as needed. Follow-up Disposition: Not on File No results found for any visits on 01/22/19. Excell MD Tyler 
 
The patient verbalized understanding of the problems and plans as explained.

## 2019-01-22 NOTE — PROGRESS NOTES
Chief Complaint Patient presents with  Results  
  discuss MRI results 1. Have you been to the ER, urgent care clinic since your last visit? Hospitalized since your last visit? No 
 
2. Have you seen or consulted any other health care providers outside of the [address] [place] [address] since your last visit? Include any pap smears or colon screening.  No

## 2019-01-30 DIAGNOSIS — M19.90 ARTHRITIS: ICD-10-CM

## 2019-01-31 RX ORDER — DICLOFENAC SODIUM 75 MG/1
TABLET, DELAYED RELEASE ORAL
Qty: 180 TAB | Refills: 3 | Status: SHIPPED | OUTPATIENT
Start: 2019-01-31 | End: 2020-07-13 | Stop reason: SDUPTHER

## 2019-01-31 NOTE — TELEPHONE ENCOUNTER
RX refill request from the patient/pharmacy. Patient last seen 01- with labs, and next appt. scheduled for 04-  Requested Prescriptions     Pending Prescriptions Disp Refills    diclofenac EC (VOLTAREN) 75 mg EC tablet [Pharmacy Med Name: DICLOFENAC SOD EC 75 MG TAB] 180 Tab 3     Sig: TAKE 1 TABLET BY MOUTH TWICE A DAY   .

## 2019-02-13 ENCOUNTER — HOSPITAL ENCOUNTER (OUTPATIENT)
Dept: PREADMISSION TESTING | Age: 64
Discharge: HOME | End: 2019-02-13
Payer: COMMERCIAL

## 2019-02-13 VITALS
WEIGHT: 116.84 LBS | SYSTOLIC BLOOD PRESSURE: 128 MMHG | OXYGEN SATURATION: 98 % | DIASTOLIC BLOOD PRESSURE: 56 MMHG | TEMPERATURE: 97.7 F | RESPIRATION RATE: 16 BRPM | HEART RATE: 67 BPM | HEIGHT: 58 IN | BODY MASS INDEX: 24.53 KG/M2

## 2019-02-13 LAB
ABO + RH BLD: NORMAL
ALBUMIN SERPL-MCNC: 4.3 G/DL (ref 3.5–5)
ALBUMIN/GLOB SERPL: 1.1 {RATIO} (ref 1.1–2.2)
ALP SERPL-CCNC: 67 U/L (ref 45–117)
ALT SERPL-CCNC: 76 U/L (ref 12–78)
ANION GAP SERPL CALC-SCNC: 7 MMOL/L (ref 5–15)
APPEARANCE UR: CLEAR
APTT PPP: 28.4 SEC (ref 22.1–32)
AST SERPL-CCNC: 54 U/L (ref 15–37)
ATRIAL RATE: 63 BPM
BACTERIA URNS QL MICRO: NEGATIVE /HPF
BASOPHILS # BLD: 0.1 K/UL (ref 0–0.1)
BASOPHILS NFR BLD: 1 % (ref 0–1)
BILIRUB SERPL-MCNC: 0.4 MG/DL (ref 0.2–1)
BILIRUB UR QL: NEGATIVE
BLOOD GROUP ANTIBODIES SERPL: NORMAL
BUN SERPL-MCNC: 16 MG/DL (ref 6–20)
BUN/CREAT SERPL: 18 (ref 12–20)
CALCIUM SERPL-MCNC: 9.1 MG/DL (ref 8.5–10.1)
CALCULATED P AXIS, ECG09: 56 DEGREES
CALCULATED R AXIS, ECG10: 6 DEGREES
CALCULATED T AXIS, ECG11: 37 DEGREES
CHLORIDE SERPL-SCNC: 103 MMOL/L (ref 97–108)
CO2 SERPL-SCNC: 28 MMOL/L (ref 21–32)
COLOR UR: ABNORMAL
CREAT SERPL-MCNC: 0.88 MG/DL (ref 0.55–1.02)
DIAGNOSIS, 93000: NORMAL
DIFFERENTIAL METHOD BLD: ABNORMAL
EOSINOPHIL # BLD: 0.5 K/UL (ref 0–0.4)
EOSINOPHIL NFR BLD: 9 % (ref 0–7)
EPITH CASTS URNS QL MICRO: ABNORMAL /LPF
ERYTHROCYTE [DISTWIDTH] IN BLOOD BY AUTOMATED COUNT: 13.2 % (ref 11.5–14.5)
EST. AVERAGE GLUCOSE BLD GHB EST-MCNC: 120 MG/DL
GLOBULIN SER CALC-MCNC: 3.8 G/DL (ref 2–4)
GLUCOSE SERPL-MCNC: 95 MG/DL (ref 65–100)
GLUCOSE UR STRIP.AUTO-MCNC: NEGATIVE MG/DL
HBA1C MFR BLD: 5.8 % (ref 4.2–6.3)
HCT VFR BLD AUTO: 38.2 % (ref 35–47)
HGB BLD-MCNC: 12.6 G/DL (ref 11.5–16)
HGB UR QL STRIP: NEGATIVE
IMM GRANULOCYTES # BLD AUTO: 0 K/UL (ref 0–0.04)
IMM GRANULOCYTES NFR BLD AUTO: 1 % (ref 0–0.5)
INR PPP: 1 (ref 0.9–1.1)
KETONES UR QL STRIP.AUTO: NEGATIVE MG/DL
LEUKOCYTE ESTERASE UR QL STRIP.AUTO: ABNORMAL
LYMPHOCYTES # BLD: 2.2 K/UL (ref 0.8–3.5)
LYMPHOCYTES NFR BLD: 36 % (ref 12–49)
MCH RBC QN AUTO: 31 PG (ref 26–34)
MCHC RBC AUTO-ENTMCNC: 33 G/DL (ref 30–36.5)
MCV RBC AUTO: 94.1 FL (ref 80–99)
MONOCYTES # BLD: 0.5 K/UL (ref 0–1)
MONOCYTES NFR BLD: 8 % (ref 5–13)
NEUTS SEG # BLD: 2.8 K/UL (ref 1.8–8)
NEUTS SEG NFR BLD: 45 % (ref 32–75)
NITRITE UR QL STRIP.AUTO: NEGATIVE
NRBC # BLD: 0 K/UL (ref 0–0.01)
NRBC BLD-RTO: 0 PER 100 WBC
P-R INTERVAL, ECG05: 138 MS
PH UR STRIP: 6.5 [PH] (ref 5–8)
PLATELET # BLD AUTO: 251 K/UL (ref 150–400)
PMV BLD AUTO: 10.3 FL (ref 8.9–12.9)
POTASSIUM SERPL-SCNC: 3.8 MMOL/L (ref 3.5–5.1)
PROT SERPL-MCNC: 8.1 G/DL (ref 6.4–8.2)
PROT UR STRIP-MCNC: NEGATIVE MG/DL
PROTHROMBIN TIME: 10 SEC (ref 9–11.1)
Q-T INTERVAL, ECG07: 406 MS
QRS DURATION, ECG06: 68 MS
QTC CALCULATION (BEZET), ECG08: 415 MS
RBC # BLD AUTO: 4.06 M/UL (ref 3.8–5.2)
RBC #/AREA URNS HPF: ABNORMAL /HPF (ref 0–5)
SODIUM SERPL-SCNC: 138 MMOL/L (ref 136–145)
SP GR UR REFRACTOMETRY: 1.01 (ref 1–1.03)
SPECIMEN EXP DATE BLD: NORMAL
THERAPEUTIC RANGE,PTTT: NORMAL SECS (ref 58–77)
UA: UC IF INDICATED,UAUC: ABNORMAL
UROBILINOGEN UR QL STRIP.AUTO: 0.2 EU/DL (ref 0.2–1)
VENTRICULAR RATE, ECG03: 63 BPM
WBC # BLD AUTO: 6.2 K/UL (ref 3.6–11)
WBC URNS QL MICRO: ABNORMAL /HPF (ref 0–4)

## 2019-02-13 PROCEDURE — 85025 COMPLETE CBC W/AUTO DIFF WBC: CPT

## 2019-02-13 PROCEDURE — 80053 COMPREHEN METABOLIC PANEL: CPT

## 2019-02-13 PROCEDURE — 81001 URINALYSIS AUTO W/SCOPE: CPT

## 2019-02-13 PROCEDURE — 85730 THROMBOPLASTIN TIME PARTIAL: CPT

## 2019-02-13 PROCEDURE — 36415 COLL VENOUS BLD VENIPUNCTURE: CPT

## 2019-02-13 PROCEDURE — 93005 ELECTROCARDIOGRAM TRACING: CPT

## 2019-02-13 PROCEDURE — 83036 HEMOGLOBIN GLYCOSYLATED A1C: CPT

## 2019-02-13 PROCEDURE — 97161 PT EVAL LOW COMPLEX 20 MIN: CPT

## 2019-02-13 PROCEDURE — 85610 PROTHROMBIN TIME: CPT

## 2019-02-13 PROCEDURE — 86900 BLOOD TYPING SEROLOGIC ABO: CPT

## 2019-02-13 PROCEDURE — 97530 THERAPEUTIC ACTIVITIES: CPT

## 2019-02-13 RX ORDER — TEMAZEPAM 15 MG/1
15 CAPSULE ORAL
COMMUNITY
End: 2019-02-21

## 2019-02-13 NOTE — PERIOP NOTES
Preventing Infections Before and After  Your SurgeryIMPORTANT INSTRUCTIONSPlease read and follow these instructions carefully. If you are unable to comply with the below instructions your procedure will be cancelled. Every Night for Three (3) nights before your surgery: 1. Shower with an antibacterial soap, such as Dial, or the soap provided at your preassessment appointment. A shower is better than a bath for cleaning your skin. 2. If needed, ask someone to help you reach all areas of your body. Dont forget to clean your belly button with every shower. The night before your surgery: If you lose your Hibiclens please contact surgery center or you can purchase it at a local pharmacy 1. On the night before your surgery, shower with an antibacterial soap, such as Dial, or the soap provided at your preassessment appointment. 2. With one packet of Hibiclens in hand, turn water off. 
3. Apply Hibiclens antiseptic skin cleanser with a clean, freshly washed washcloth. ? Gently apply to your body from chin to toes (except the genital area) and especially the area(s) where your incision(s) will be. ? Leave Hibiclens on your skin for at least 20 seconds. CAUTION: If needed, Hibiclens may be used to clean the folds of skin of the legs (such as in the area of the groin) and on your buttocks and hips. However, do not use Hibiclens above the neck or in the genital area (your bottom) or put inside any area of your body. 4. Turn the water back on and rinse. 5. Dry gently with a clean, freshly washed towel. 6. After your shower, do not use any powder, deodorant, perfumes or lotion. 7. Use clean, freshly washed towels and washcloths every time you shower. 8. Wear clean, freshly washed pajamas to bed the night before surgery. 9. Sleep on clean, freshly washed sheets. 10. Do not allow pets to sleep in your bed with you. The Morning of your surgery: 1. Shower again thoroughly with an antibacterial soap, such as Dial or the soap provided at your preassessment appointment. If needed, ask someone for help to reach all areas of your body. Dont forget to clean your belly button! Rinse. 2. Dry gently with a clean, freshly washed towel. 3. After your shower, do not use any powder, deodorant, perfumes or lotion prior to surgery. 4. Put on clean, freshly washed clothing. Tips to help prevent infections after your surgery: 1. Protect your surgical wound from germs: 
? Hand washing is the most important thing you and your caregivers can do to prevent infections. ? Keep your bandage clean and dry! ? Do not touch your surgical wound. 2. Use clean, freshly washed towels and washcloths every time you shower; do not share bath linens with others. 3. Until your surgical wound is healed, wear clothing and sleep on bed linens each day that are clean and freshly washed. 4. Do not allow pets to sleep in your bed with you or touch your surgical wound. 5. Do not smoke  smoking delays wound healing. This may be a good time to stop smoking. 6. If you have diabetes, it is important for you to manage your blood sugar levels properly before your surgery as well as after your surgery. Poorly managed blood sugar levels slow down wound healing and prevent you from healing completely. If you lose your Hibiclens, please call the [facility], or it is available for purchase at your pharmacy. ___________________      ___________________      ________________ 
(Signature of Patient)          (Witness)                   (Date and Time)

## 2019-02-13 NOTE — PERIOP NOTES
Incentive Crispin Weiss Using the incentive spirometer helps expand the small air sacs of your lungs, helps you breathe deeply, and helps improve your lung function. Use your incentive spirometer twice a day (10 breaths each time) prior to surgery. How to Use Your Incentive Spirometer: 1. Hold the incentive spirometer in an upright position. 2. Breathe out as usual.  
3. Place the mouthpiece in your mouth and seal your lips tightly around it. 4. Take a deep breath. Breathe in slowly and as deeply as possible. Keep the blue flow rate guide between the arrows. 5. Hold your breath as long as possible. Then exhale slowly and allow the piston to fall to the bottom of the column. 6. Rest for a few seconds and repeat steps one through five at least 10 times. PAT Tidal Volume_____1700 
_____________  x_____2___________  Date___2/13/2019__________________ BRING THE INCENTIVE SPIROMETER WITH YOU TO THE HOSPITAL ON THE DAY OF YOUR SURGERY. Opportunity given to ask and answer questions as well as to observe return demonstration. Patient signature_____________________________    Witness____Woody ABDI________________________

## 2019-02-13 NOTE — PROGRESS NOTES
[facility] Physical Therapy Pre-surgery evaluation [address] [facility], [address] [facility] physical Therapy pre SPINE surgery EVALUATION Patient: Colleen Anglin (18 y.o. female) Date: 2/13/2019 Primary Diagnosis: PAT Precautions:     
 
ASSESSMENT : 
Based on the objective data described below, the patient presents with impaired gait, impaired balance, increased pain, and impaired functional mobility due to end stage degenerative joint disease in the cervical spine. Discussed anticipated disposition to home with possible discharge within a 1 to 2 day time frame post-surgery. Patient in agreement. She lives with her  and 3 grandchildren, under the age of 15. GOALS: (Goals have been discussed and agreed upon with patient.) DISCHARGE GOALS: Time Frame: 1 DAY 1. Patient will demonstrate increased strength, range of motion, and pain control via a home exercise program in order to minimize functional deficits in preparation for their upcoming surgery. This will be achieved by using education, demonstration  and through the use of an informational handout including a home exercise program. 
REHABILITATION POTENTIAL FOR STATED GOALS: good RECOMMENDATIONS AND PLANNED INTERVENTIONS: (Benefits and precautions of physical therapy have been discussed with the patient.) 1. Home Exercise Program 
TREATMENT PLAN EFFECTIVE DATES: 11/27/2018 TO 11/27/2018 FREQUENCY/DURATION: Patient to continue to perform home exercise program at least twice daily until surgery. SUBJECTIVE:  
Patient stated Do I have any lifting restrictions, I have lap dogs at home.  OBJECTIVE DATA SUMMARY:  
HISTORY:   
Past Medical History:  
Diagnosis Date  Adverse effect of anesthesia Pt states her daughter had an Anaphylactic reaction to Anesthesia  Arthritis  Back pain  Hypertension Single episode  Menopause  Neck pain  Psychiatric disorder Depression, Anxiety  Thyroid disease Past Surgical History:  
Procedure Laterality Date  HX ORTHOPAEDIC Rotator Cuff Repair, Right  HX ORTHOPAEDIC Carpal Tunnel Repair, Right Prior Level of Function/Home Situation: patient lives with her  and 3 grandchildren. She is independent with all aspects functional mobility and ADLS. She drives, cooks, and cleans. She is retired and  works although will take days off.  with previous CVA although with no residual deficits. She reports one fall in the last year on a wet floor while wearing heels. Personal factors and/or comorbidities impacting plan of care:  
 
 Home Situation Home Environment: Private residence # Steps to Enter: 3 Rails to Enter: Yes Hand Rails : Bilateral(wide) One/Two Story Residence: One story Living Alone: No 
Support Systems: Family member(s), Spouse/Significant Other/Partner Patient Expects to be Discharged to[de-identified] Private residence Current DME Used/Available at Home: Walker, rolling Tub or Shower Type: Tub/Shower combination EXAMINATION/PRESENTATION/DECISION MAKING:  
 
ADLs (Current Functional Status): Bathing/Showering:  
[x] Independent 
[] Requires Assistance from Someone 
[] Sponge Bath Only Ambulation: 
[x] Independent 
[] Walk Indoors Only 
[] Walk Outdoors [] Use Assistive Device [] Use Wheelchair Only Dressing: 
[x] Independent Requires Assistance from Someone for: 
[] Sock/Shoes 
[] Pants 
[] Everything Household Activities: 
[x] Routine house and yard work 
[] Light Housework Only 
[] None Critical Behavior: 
Neurologic State: Drowsy, Eyes open to voice Orientation Level: Oriented to person, Oriented to situation Cognition: Follows commands Critical Behavior: 
  
  
  
  
 
Strength:   
Strength: Within functional limits Tone & Sensation:  
Tone: Normal 
  
  
  
  
 Sensation: Impaired(tingling in R shoulder and hand, sometimes L hand ) Range Of Motion: 
AROM: Within functional limits PROM: Within functional limits Coordination: 
Coordination: Within functional limits Functional Mobility: 
Transfers: 
Sit to Stand: Independent Stand to Sit: Independent Balance:  
Sitting: Intact Standing: Intact Ambulation/Gait Training: 
Distance (ft): 150 Feet (ft) Ambulation - Level of Assistance: Independent Gait Description (WDL): Exceptions to [facility] Base of Support: Narrowed Functional Measure: 
10 Meter walk test:  (Specify if any supplemental oxygen is used, the type, pre, during and post sats.) Self-Selected Or Fast-Velocity: Self Selected Velocity Trial 1 (Time to Walk 10 Meters): 9.6 Seconds Trial 2 (Time to Walk 10 Meters): 9.5 Seconds Trial 3 (Time to Walk 10 Meters): 8.01 Seconds Average : 9 Seconds Score (Meters/Second): 1.1 Meters/Second Walking Speed (m/s) Modifier Scale Age 52-63 Age 61-76 Age 66-77 Age 80-80 Male Female Male Female Male Female Male Female CH 
 0% Impaired ? 1.39 ? 1.40 ? 1.36 ? 1.30 ? 1.33 ? 1.27 ? 1.21 ? 1.15  
CI  
1-19% Impaired 1.11-1.38 1.12-1.39 1.09-1.35 1.04-1.29 1.06-1.32 1.01-1.26 0.96-1.20 0.92-1.14 CJ  
20-39% Impaired 0.83-1.10 0.84-1.11 0.82-1.08 0.78-1.03 0.80-1.05 0.76-1.00 0.72-0.95 0.69-0.91 CK  
40-59% Impaired 0.56-0.82 0.57-0.83 0.54-0.81 0.52-0.77 0.53-0.79 0.51-0.75 0.48-0.71 0.46-0.68 CL  
60-79% Impaired 0.28-0.55 0.28-0.56 0.27-0.53 0.26-0.51 0.27-0.52 0.25-0.50 0.24-0.49 0.23-0.45 CM 
 80-99% Impaired 0.01-0.28 < 0.01-0.28 < 0.01-0.27 < 0.01-0.26 0.01-0.27 0.01-0.24 0.01-0.23 0.01-0.22  
CN  
100% Impaired Cannot Perform Minimal Detectable Change (MDC-90) = 0.1 m/s Trinity MAYER  \"Comfortable and maximum walking speed of adults aged 20-79 years: reference values and determinants. \" Age and Agin Volume 26(1):15-9. Kathryn Melara. \"Age- and gender-related test performance in community-dwelling elderly people: Six-Minute Walk Test, Quevedo Balance Scale, Timed Up & Go Test, and gait speeds. \" Physical Therapy: 2002 Volume 82(2):128-37. Sandra Tomlinson DM, Danitza PW, Mina SNYDERD, Clay WELDON. \"Assessing stability and change of four performance measures: a longitudinal study evaluating outcome following total hip and knee arthroplasty. \" [facility] Musculoskeletal Disorders: 2005 Volume 6(3). Mary Mandel, PhD; Cortez Ma, PhD. Godfrey Dong Paper: \"Walking Speed: the Sixth Vital Sign\" Journal of Geriatric Physical Therapy: 2009 - Volume 32 - Issue 2 - p 25 . Pain: 
Pain Scale 1: Numeric (0 - 10) Radicular Pain:  
Reports R shoulder pain and tingling going down the arm into the hand. She also reports her L hand occasionally tingles. Activity Tolerance:  
Good. Patient []   does  [x]   does not demonstrate signs/symptoms of shortness of breath/dyspnea on exertion/respiratory distress. COMMUNICATION/EDUCATION:  
The patient was educated on: 
[x]         Early post operative mobility is imperative to achieve a patient's desired outcomes and to restore biological function. [x]         Post operative spinal precautions may/may not be applicable. These precautions are based on the patient's physician and the procedure(s) performed. [x]         Spinal precautions including: ·   No lifting more than 5-10 pounds ·   No sitting longer than 30-60 minutes at a time The patients plan of care was discussed as follows:  
[x]         The patient verbalized understanding of her plan in preparation for their upcoming surgery 
[]         The patient's  was present for this session 
[]        The patient reports that he/she does not have a  identified at this time []         The  verbalized understanding of the education regarding the patient's upcoming surgery [x]         Patient/family agree to work toward stated goals and plan of care. []         Patient understands intent and goals of therapy, but is neutral about his/her participation. []         Patient is unable to participate in goal setting and plan of care.  
 
 
Thank you for this referral. 
Adelia Vázquez, PT, DPT

## 2019-02-13 NOTE — PERIOP NOTES
[facility] Preoperative Instructions Surgery Date 2/20/2019         Time of Arrival 6:45 AM 
 
1. On the day of your surgery, please report to the Surgical Services Registration Desk and sign in at your designated time. The Surgery Center is located to the right of the Emergency Room. 2. You must have someone with you to drive you home. You should not drive a car for 24 hours following surgery. Please make arrangements for a friend or family member to stay with you for the first 24 hours after your surgery. 3. Do not have anything to eat or drink (including water, gum, mints, coffee, juice) after midnight 2/19/2019?? Kelly Rosenbaum ? This may not apply to medications prescribed by your physician. ?(Please note below the special instructions with medications to take the morning of your procedure.) 4. We recommend you do not drink any alcoholic beverages for 24 hours before and after your surgery. 5. Contact your surgeons office for instructions on the following medications: non-steroidal anti-inflammatory drugs (i.e. Advil, Aleve), vitamins, and supplements. (Some surgeons will want you to stop these medications prior to surgery and others may allow you to take them) **If you are currently taking Plavix, Coumadin, Aspirin and/or other blood-thinning agents, contact your surgeon for instructions. ** Your surgeon will partner with the physician prescribing these medications to determine if it is safe to stop or if you need to continue taking. Please do not stop taking these medications without instructions from your surgeon 6. Wear comfortable clothes. Wear glasses instead of contacts. Do not bring any money or jewelry. Please bring picture ID, insurance card, and any prearranged co-payment or hospital payment. Do not wear make-up, particularly mascara the morning of your surgery. Do not wear nail polish, particularly if you are having foot /hand surgery.   Wear your hair loose or down, no ponytails, buns, marko pins or clips. All body piercings must be removed. Please shower with antibacterial soap for three consecutive days before and on the morning of surgery, but do not apply any lotions, powders or deodorants after the shower on the day of surgery. Please use a fresh towels after each shower. Please sleep in clean clothes and change bed linens the night before surgery. Please do not shave for 48 hours prior to surgery. Shaving of the face is acceptable. 7. You should understand that if you do not follow these instructions your surgery may be cancelled. If your physical condition changes (I.e. fever, cold or flu) please contact your surgeon as soon as possible. 8. It is important that you be on time. If a situation occurs where you may be late, please call [phone] (OR Holding Area). 9. If you have any questions and or problems, please call [phone] (Pre-admission Testing). 10. Your surgery time may be subject to change. You will receive a phone call the evening prior if your time changes. 11.  If having outpatient surgery, you must have someone to drive you here, stay with you during the duration of your stay, and to drive you home at time of discharge. 12.   In an effort to improve the efficiency, privacy, and safety for all of our Pre-op patients visitors are not allowed in the Holding area. Once you arrive and are registered your family/visitors will be asked to remain in the waiting room. The Pre-op staff will get you from the Surgical Waiting Area and will explain to you and your family/visitors that the Pre-op phase is beginning. The staff will answer any questions and provide instructions for tracking of the patient, by use of the existing tracking number and color-coded status board in the waiting room.   At this time the staff will also ask for your designated spokesperson information in the event that the physician or staff need to provide an update or obtain any pertinent information. The designated spokesperson will be notified if the physician needs to speak to family during the pre-operative phase. If at any time your family/visitors has questions or concerns they may approach the volunteer desk in the waiting area for assistance. Special Instructions:Bring incentive spirometer and Notebook with you to the hospital the day of Surgery. MEDICATIONS TO TAKE THE MORNING OF SURGERY WITH A SIP OF WATER:Levothyroxine, Sertraline I understand a pre-operative phone call will be made to verify my surgery time. In the event that I am not available, I give permission for a message to be left on my answering service and/or with another person? Yes [phone] 
 
 
 
 ___________________      __________   _________ 
  (Signature of Patient)             (Witness)                (Date and Time)

## 2019-02-14 LAB
BACTERIA SPEC CULT: NORMAL
BACTERIA SPEC CULT: NORMAL
SERVICE CMNT-IMP: NORMAL

## 2019-02-20 ENCOUNTER — ANESTHESIA EVENT (OUTPATIENT)
Dept: SURGERY | Age: 64
End: 2019-02-20
Payer: COMMERCIAL

## 2019-02-20 ENCOUNTER — HOSPITAL ENCOUNTER (OUTPATIENT)
Age: 64
Setting detail: OBSERVATION
Discharge: HOME | End: 2019-02-21
Attending: NEUROLOGICAL SURGERY | Admitting: NEUROLOGICAL SURGERY
Payer: COMMERCIAL

## 2019-02-20 ENCOUNTER — APPOINTMENT (OUTPATIENT)
Dept: GENERAL RADIOLOGY | Age: 64
End: 2019-02-20
Attending: NEUROLOGICAL SURGERY
Payer: COMMERCIAL

## 2019-02-20 ENCOUNTER — ANESTHESIA (OUTPATIENT)
Dept: SURGERY | Age: 64
End: 2019-02-20
Payer: COMMERCIAL

## 2019-02-20 DIAGNOSIS — Z98.1 S/P CERVICAL SPINAL FUSION: Primary | ICD-10-CM

## 2019-02-20 PROBLEM — M50.20 HERNIATION OF CERVICAL INTERVERTEBRAL DISC: Status: ACTIVE | Noted: 2019-02-20

## 2019-02-20 PROCEDURE — 74011000250 HC RX REV CODE- 250

## 2019-02-20 PROCEDURE — 76000 FLUOROSCOPY <1 HR PHYS/QHP: CPT

## 2019-02-20 PROCEDURE — 77030003029 HC SUT VCRL J&J -B: Performed by: NEUROLOGICAL SURGERY

## 2019-02-20 PROCEDURE — 77030032490 HC SLV COMPR SCD KNE COVD -B: Performed by: NEUROLOGICAL SURGERY

## 2019-02-20 PROCEDURE — 72040 X-RAY EXAM NECK SPINE 2-3 VW: CPT

## 2019-02-20 PROCEDURE — 99218 HC RM OBSERVATION: CPT

## 2019-02-20 PROCEDURE — 74011250636 HC RX REV CODE- 250/636

## 2019-02-20 PROCEDURE — 74011000272 HC RX REV CODE- 272: Performed by: NEUROLOGICAL SURGERY

## 2019-02-20 PROCEDURE — 77030034850: Performed by: NEUROLOGICAL SURGERY

## 2019-02-20 PROCEDURE — 76060000034 HC ANESTHESIA 1.5 TO 2 HR: Performed by: NEUROLOGICAL SURGERY

## 2019-02-20 PROCEDURE — 77030013079 HC BLNKT BAIR HGGR 3M -A: Performed by: ANESTHESIOLOGY

## 2019-02-20 PROCEDURE — 77030002933 HC SUT MCRYL J&J -A: Performed by: NEUROLOGICAL SURGERY

## 2019-02-20 PROCEDURE — 74011250636 HC RX REV CODE- 250/636: Performed by: ANESTHESIOLOGY

## 2019-02-20 PROCEDURE — 77010033678 HC OXYGEN DAILY

## 2019-02-20 PROCEDURE — 76010000162 HC OR TIME 1.5 TO 2 HR INTENSV-TIER 1: Performed by: NEUROLOGICAL SURGERY

## 2019-02-20 PROCEDURE — 77030020782 HC GWN BAIR PAWS FLX 3M -B

## 2019-02-20 PROCEDURE — 77030009868 HC PIN DISTR CASPR AESC -B: Performed by: NEUROLOGICAL SURGERY

## 2019-02-20 PROCEDURE — 77030003666 HC NDL SPINAL BD -A: Performed by: NEUROLOGICAL SURGERY

## 2019-02-20 PROCEDURE — 77030029099 HC BN WAX SSPC -A: Performed by: NEUROLOGICAL SURGERY

## 2019-02-20 PROCEDURE — 74011250637 HC RX REV CODE- 250/637: Performed by: NEUROLOGICAL SURGERY

## 2019-02-20 PROCEDURE — 76210000016 HC OR PH I REC 1 TO 1.5 HR: Performed by: NEUROLOGICAL SURGERY

## 2019-02-20 PROCEDURE — 77030018846 HC SOL IRR STRL H20 ICUM -A: Performed by: NEUROLOGICAL SURGERY

## 2019-02-20 PROCEDURE — 74011250636 HC RX REV CODE- 250/636: Performed by: NEUROLOGICAL SURGERY

## 2019-02-20 PROCEDURE — 77030021678 HC GLIDESCP STAT DISP VERT -B: Performed by: ANESTHESIOLOGY

## 2019-02-20 PROCEDURE — 77030026438 HC STYL ET INTUB CARD -A: Performed by: ANESTHESIOLOGY

## 2019-02-20 PROCEDURE — 77030008771 HC TU NG SALEM SUMP -A: Performed by: ANESTHESIOLOGY

## 2019-02-20 PROCEDURE — 77030034696 HC CATH URETH FOL 2W BARD -A: Performed by: NEUROLOGICAL SURGERY

## 2019-02-20 PROCEDURE — 74011000250 HC RX REV CODE- 250: Performed by: NEUROLOGICAL SURGERY

## 2019-02-20 PROCEDURE — 77030018836 HC SOL IRR NACL ICUM -A: Performed by: NEUROLOGICAL SURGERY

## 2019-02-20 PROCEDURE — C1713 ANCHOR/SCREW BN/BN,TIS/BN: HCPCS | Performed by: NEUROLOGICAL SURGERY

## 2019-02-20 PROCEDURE — 77030004391 HC BUR FLUT MEDT -C: Performed by: NEUROLOGICAL SURGERY

## 2019-02-20 PROCEDURE — 77030013474 HC CRD BPLR DISP ADLR -A: Performed by: NEUROLOGICAL SURGERY

## 2019-02-20 PROCEDURE — 77030036805 HC BN SUB MTRX CLLGN SPNG GLBM -C: Performed by: NEUROLOGICAL SURGERY

## 2019-02-20 PROCEDURE — 77030008684 HC TU ET CUF COVD -B: Performed by: ANESTHESIOLOGY

## 2019-02-20 PROCEDURE — 77030020619 HC SPCR SPN CGE COL GLBM -I1: Performed by: NEUROLOGICAL SURGERY

## 2019-02-20 PROCEDURE — 77030014647 HC SEAL FBRN TISSL BAXT -D: Performed by: NEUROLOGICAL SURGERY

## 2019-02-20 DEVICE — COALITION SPACER, 12MM X 14MM, 7DEG, 6MM
Type: IMPLANTABLE DEVICE | Site: SPINE CERVICAL | Status: FUNCTIONAL
Brand: COALITION

## 2019-02-20 DEVICE — CONDUCT MATRIX SPONGE, 2X2X0.5CM, 2CC (1/BOX)
Type: IMPLANTABLE DEVICE | Site: SPINE CERVICAL | Status: FUNCTIONAL
Brand: CONDUCT

## 2019-02-20 DEVICE — 3.6MM BONE SCREW, VARIABLE, SELF-DRILLING, 12MM
Type: IMPLANTABLE DEVICE | Site: SPINE CERVICAL | Status: FUNCTIONAL
Brand: COALITION

## 2019-02-20 RX ORDER — FENTANYL CITRATE 50 UG/ML
25 INJECTION, SOLUTION INTRAMUSCULAR; INTRAVENOUS
Status: COMPLETED | OUTPATIENT
Start: 2019-02-20 | End: 2019-02-20

## 2019-02-20 RX ORDER — SODIUM CHLORIDE 0.9 % (FLUSH) 0.9 %
5-40 SYRINGE (ML) INJECTION AS NEEDED
Status: DISCONTINUED | OUTPATIENT
Start: 2019-02-20 | End: 2019-02-20 | Stop reason: HOSPADM

## 2019-02-20 RX ORDER — ROCURONIUM BROMIDE 10 MG/ML
INJECTION, SOLUTION INTRAVENOUS AS NEEDED
Status: DISCONTINUED | OUTPATIENT
Start: 2019-02-20 | End: 2019-02-20 | Stop reason: HOSPADM

## 2019-02-20 RX ORDER — DEXAMETHASONE SODIUM PHOSPHATE 4 MG/ML
INJECTION, SOLUTION INTRA-ARTICULAR; INTRALESIONAL; INTRAMUSCULAR; INTRAVENOUS; SOFT TISSUE AS NEEDED
Status: DISCONTINUED | OUTPATIENT
Start: 2019-02-20 | End: 2019-02-20 | Stop reason: HOSPADM

## 2019-02-20 RX ORDER — ONDANSETRON 2 MG/ML
4 INJECTION INTRAMUSCULAR; INTRAVENOUS AS NEEDED
Status: DISCONTINUED | OUTPATIENT
Start: 2019-02-20 | End: 2019-02-20 | Stop reason: HOSPADM

## 2019-02-20 RX ORDER — MIDAZOLAM HYDROCHLORIDE 1 MG/ML
INJECTION, SOLUTION INTRAMUSCULAR; INTRAVENOUS AS NEEDED
Status: DISCONTINUED | OUTPATIENT
Start: 2019-02-20 | End: 2019-02-20 | Stop reason: HOSPADM

## 2019-02-20 RX ORDER — MIDAZOLAM HYDROCHLORIDE 1 MG/ML
1 INJECTION, SOLUTION INTRAMUSCULAR; INTRAVENOUS AS NEEDED
Status: DISCONTINUED | OUTPATIENT
Start: 2019-02-20 | End: 2019-02-20 | Stop reason: HOSPADM

## 2019-02-20 RX ORDER — SODIUM CHLORIDE, SODIUM LACTATE, POTASSIUM CHLORIDE, CALCIUM CHLORIDE 600; 310; 30; 20 MG/100ML; MG/100ML; MG/100ML; MG/100ML
75 INJECTION, SOLUTION INTRAVENOUS CONTINUOUS
Status: DISCONTINUED | OUTPATIENT
Start: 2019-02-20 | End: 2019-02-20 | Stop reason: HOSPADM

## 2019-02-20 RX ORDER — HYDROMORPHONE HYDROCHLORIDE 1 MG/ML
0.2 INJECTION, SOLUTION INTRAMUSCULAR; INTRAVENOUS; SUBCUTANEOUS
Status: DISCONTINUED | OUTPATIENT
Start: 2019-02-20 | End: 2019-02-20 | Stop reason: HOSPADM

## 2019-02-20 RX ORDER — FENTANYL CITRATE 50 UG/ML
INJECTION, SOLUTION INTRAMUSCULAR; INTRAVENOUS
Status: COMPLETED
Start: 2019-02-20 | End: 2019-02-20

## 2019-02-20 RX ORDER — OXYCODONE HYDROCHLORIDE 5 MG/1
10 TABLET ORAL
Status: DISCONTINUED | OUTPATIENT
Start: 2019-02-20 | End: 2019-02-21 | Stop reason: HOSPADM

## 2019-02-20 RX ORDER — CEFAZOLIN SODIUM/WATER 2 G/20 ML
2 SYRINGE (ML) INTRAVENOUS
Status: COMPLETED | OUTPATIENT
Start: 2019-02-20 | End: 2019-02-20

## 2019-02-20 RX ORDER — LEVOTHYROXINE SODIUM 75 UG/1
75 TABLET ORAL
Status: DISCONTINUED | OUTPATIENT
Start: 2019-02-20 | End: 2019-02-21 | Stop reason: HOSPADM

## 2019-02-20 RX ORDER — ONDANSETRON 2 MG/ML
INJECTION INTRAMUSCULAR; INTRAVENOUS AS NEEDED
Status: DISCONTINUED | OUTPATIENT
Start: 2019-02-20 | End: 2019-02-20 | Stop reason: HOSPADM

## 2019-02-20 RX ORDER — SODIUM CHLORIDE 0.9 % (FLUSH) 0.9 %
5-40 SYRINGE (ML) INJECTION EVERY 8 HOURS
Status: DISCONTINUED | OUTPATIENT
Start: 2019-02-20 | End: 2019-02-21 | Stop reason: HOSPADM

## 2019-02-20 RX ORDER — NALOXONE HYDROCHLORIDE 0.4 MG/ML
0.4 INJECTION, SOLUTION INTRAMUSCULAR; INTRAVENOUS; SUBCUTANEOUS AS NEEDED
Status: DISCONTINUED | OUTPATIENT
Start: 2019-02-20 | End: 2019-02-21 | Stop reason: HOSPADM

## 2019-02-20 RX ORDER — SODIUM CHLORIDE, SODIUM LACTATE, POTASSIUM CHLORIDE, CALCIUM CHLORIDE 600; 310; 30; 20 MG/100ML; MG/100ML; MG/100ML; MG/100ML
25 INJECTION, SOLUTION INTRAVENOUS CONTINUOUS
Status: DISCONTINUED | OUTPATIENT
Start: 2019-02-20 | End: 2019-02-20 | Stop reason: HOSPADM

## 2019-02-20 RX ORDER — ATORVASTATIN CALCIUM 10 MG/1
10 TABLET, FILM COATED ORAL DAILY
Status: DISCONTINUED | OUTPATIENT
Start: 2019-02-20 | End: 2019-02-21 | Stop reason: HOSPADM

## 2019-02-20 RX ORDER — LIDOCAINE HYDROCHLORIDE 10 MG/ML
0.1 INJECTION, SOLUTION EPIDURAL; INFILTRATION; INTRACAUDAL; PERINEURAL AS NEEDED
Status: DISCONTINUED | OUTPATIENT
Start: 2019-02-20 | End: 2019-02-20 | Stop reason: HOSPADM

## 2019-02-20 RX ORDER — AMOXICILLIN 250 MG
1 CAPSULE ORAL 2 TIMES DAILY
Status: DISCONTINUED | OUTPATIENT
Start: 2019-02-20 | End: 2019-02-21 | Stop reason: HOSPADM

## 2019-02-20 RX ORDER — HYDROMORPHONE HYDROCHLORIDE 2 MG/ML
INJECTION, SOLUTION INTRAMUSCULAR; INTRAVENOUS; SUBCUTANEOUS AS NEEDED
Status: DISCONTINUED | OUTPATIENT
Start: 2019-02-20 | End: 2019-02-20 | Stop reason: HOSPADM

## 2019-02-20 RX ORDER — FENTANYL CITRATE 50 UG/ML
INJECTION, SOLUTION INTRAMUSCULAR; INTRAVENOUS AS NEEDED
Status: DISCONTINUED | OUTPATIENT
Start: 2019-02-20 | End: 2019-02-20 | Stop reason: HOSPADM

## 2019-02-20 RX ORDER — LIDOCAINE HYDROCHLORIDE 20 MG/ML
INJECTION, SOLUTION EPIDURAL; INFILTRATION; INTRACAUDAL; PERINEURAL AS NEEDED
Status: DISCONTINUED | OUTPATIENT
Start: 2019-02-20 | End: 2019-02-20 | Stop reason: HOSPADM

## 2019-02-20 RX ORDER — ACETAMINOPHEN 325 MG/1
650 TABLET ORAL EVERY 6 HOURS
Status: DISCONTINUED | OUTPATIENT
Start: 2019-02-20 | End: 2019-02-21 | Stop reason: HOSPADM

## 2019-02-20 RX ORDER — OXYCODONE HYDROCHLORIDE 5 MG/1
5 TABLET ORAL
Status: DISCONTINUED | OUTPATIENT
Start: 2019-02-20 | End: 2019-02-21 | Stop reason: HOSPADM

## 2019-02-20 RX ORDER — SODIUM CHLORIDE 0.9 % (FLUSH) 0.9 %
5-40 SYRINGE (ML) INJECTION EVERY 8 HOURS
Status: DISCONTINUED | OUTPATIENT
Start: 2019-02-20 | End: 2019-02-20 | Stop reason: HOSPADM

## 2019-02-20 RX ORDER — FENTANYL CITRATE 50 UG/ML
50 INJECTION, SOLUTION INTRAMUSCULAR; INTRAVENOUS AS NEEDED
Status: DISCONTINUED | OUTPATIENT
Start: 2019-02-20 | End: 2019-02-20 | Stop reason: HOSPADM

## 2019-02-20 RX ORDER — CEFAZOLIN SODIUM/WATER 2 G/20 ML
2 SYRINGE (ML) INTRAVENOUS EVERY 8 HOURS
Status: COMPLETED | OUTPATIENT
Start: 2019-02-20 | End: 2019-02-20

## 2019-02-20 RX ORDER — SERTRALINE HYDROCHLORIDE 50 MG/1
50 TABLET, FILM COATED ORAL EVERY EVENING
Status: DISCONTINUED | OUTPATIENT
Start: 2019-02-20 | End: 2019-02-21 | Stop reason: HOSPADM

## 2019-02-20 RX ORDER — SODIUM CHLORIDE 9 MG/ML
50 INJECTION, SOLUTION INTRAVENOUS CONTINUOUS
Status: DISCONTINUED | OUTPATIENT
Start: 2019-02-20 | End: 2019-02-20 | Stop reason: HOSPADM

## 2019-02-20 RX ORDER — ONDANSETRON 4 MG/1
4 TABLET, ORALLY DISINTEGRATING ORAL
Status: DISCONTINUED | OUTPATIENT
Start: 2019-02-20 | End: 2019-02-21 | Stop reason: HOSPADM

## 2019-02-20 RX ORDER — SUCCINYLCHOLINE CHLORIDE 20 MG/ML
INJECTION INTRAMUSCULAR; INTRAVENOUS AS NEEDED
Status: DISCONTINUED | OUTPATIENT
Start: 2019-02-20 | End: 2019-02-20 | Stop reason: HOSPADM

## 2019-02-20 RX ORDER — SODIUM CHLORIDE 9 MG/ML
125 INJECTION, SOLUTION INTRAVENOUS CONTINUOUS
Status: DISPENSED | OUTPATIENT
Start: 2019-02-20 | End: 2019-02-21

## 2019-02-20 RX ORDER — EPHEDRINE SULFATE 50 MG/ML
INJECTION, SOLUTION INTRAVENOUS AS NEEDED
Status: DISCONTINUED | OUTPATIENT
Start: 2019-02-20 | End: 2019-02-20 | Stop reason: HOSPADM

## 2019-02-20 RX ORDER — DIPHENHYDRAMINE HYDROCHLORIDE 50 MG/ML
12.5 INJECTION, SOLUTION INTRAMUSCULAR; INTRAVENOUS AS NEEDED
Status: DISCONTINUED | OUTPATIENT
Start: 2019-02-20 | End: 2019-02-20 | Stop reason: HOSPADM

## 2019-02-20 RX ORDER — PROPOFOL 10 MG/ML
INJECTION, EMULSION INTRAVENOUS AS NEEDED
Status: DISCONTINUED | OUTPATIENT
Start: 2019-02-20 | End: 2019-02-20 | Stop reason: HOSPADM

## 2019-02-20 RX ORDER — OXYCODONE AND ACETAMINOPHEN 5; 325 MG/1; MG/1
1 TABLET ORAL AS NEEDED
Status: DISCONTINUED | OUTPATIENT
Start: 2019-02-20 | End: 2019-02-20 | Stop reason: HOSPADM

## 2019-02-20 RX ORDER — MORPHINE SULFATE 10 MG/ML
2 INJECTION, SOLUTION INTRAMUSCULAR; INTRAVENOUS
Status: DISCONTINUED | OUTPATIENT
Start: 2019-02-20 | End: 2019-02-20 | Stop reason: HOSPADM

## 2019-02-20 RX ORDER — MIDAZOLAM HYDROCHLORIDE 1 MG/ML
0.5 INJECTION, SOLUTION INTRAMUSCULAR; INTRAVENOUS
Status: DISCONTINUED | OUTPATIENT
Start: 2019-02-20 | End: 2019-02-20 | Stop reason: HOSPADM

## 2019-02-20 RX ORDER — POLYETHYLENE GLYCOL 3350 17 G/17G
17 POWDER, FOR SOLUTION ORAL DAILY
Status: DISCONTINUED | OUTPATIENT
Start: 2019-02-20 | End: 2019-02-21 | Stop reason: HOSPADM

## 2019-02-20 RX ORDER — ACETAMINOPHEN 10 MG/ML
INJECTION, SOLUTION INTRAVENOUS AS NEEDED
Status: DISCONTINUED | OUTPATIENT
Start: 2019-02-20 | End: 2019-02-20 | Stop reason: HOSPADM

## 2019-02-20 RX ORDER — SODIUM CHLORIDE 0.9 % (FLUSH) 0.9 %
5-40 SYRINGE (ML) INJECTION AS NEEDED
Status: DISCONTINUED | OUTPATIENT
Start: 2019-02-20 | End: 2019-02-21 | Stop reason: HOSPADM

## 2019-02-20 RX ADMIN — OXYCODONE HYDROCHLORIDE 5 MG: 5 TABLET ORAL at 13:16

## 2019-02-20 RX ADMIN — HYDROMORPHONE HYDROCHLORIDE 0.2 MG: 2 INJECTION, SOLUTION INTRAMUSCULAR; INTRAVENOUS; SUBCUTANEOUS at 08:55

## 2019-02-20 RX ADMIN — PROPOFOL 150 MG: 10 INJECTION, EMULSION INTRAVENOUS at 08:17

## 2019-02-20 RX ADMIN — ACETAMINOPHEN 650 MG: 325 TABLET ORAL at 13:17

## 2019-02-20 RX ADMIN — ROCURONIUM BROMIDE 40 MG: 10 INJECTION, SOLUTION INTRAVENOUS at 08:28

## 2019-02-20 RX ADMIN — ATORVASTATIN CALCIUM 10 MG: 10 TABLET, FILM COATED ORAL at 13:17

## 2019-02-20 RX ADMIN — HYDROMORPHONE HYDROCHLORIDE 0.2 MG: 1 INJECTION, SOLUTION INTRAMUSCULAR; INTRAVENOUS; SUBCUTANEOUS at 10:53

## 2019-02-20 RX ADMIN — Medication 2 G: at 23:05

## 2019-02-20 RX ADMIN — HYDROMORPHONE HYDROCHLORIDE 0.2 MG: 2 INJECTION, SOLUTION INTRAMUSCULAR; INTRAVENOUS; SUBCUTANEOUS at 10:05

## 2019-02-20 RX ADMIN — SUCCINYLCHOLINE CHLORIDE 20 MG: 20 INJECTION INTRAMUSCULAR; INTRAVENOUS at 09:39

## 2019-02-20 RX ADMIN — OXYCODONE HYDROCHLORIDE 5 MG: 5 TABLET ORAL at 16:30

## 2019-02-20 RX ADMIN — Medication 10 ML: at 22:19

## 2019-02-20 RX ADMIN — SERTRALINE HYDROCHLORIDE 50 MG: 50 TABLET ORAL at 18:12

## 2019-02-20 RX ADMIN — ONDANSETRON 4 MG: 4 TABLET, ORALLY DISINTEGRATING ORAL at 16:39

## 2019-02-20 RX ADMIN — PROPOFOL 50 MG: 10 INJECTION, EMULSION INTRAVENOUS at 09:39

## 2019-02-20 RX ADMIN — ONDANSETRON 4 MG: 2 INJECTION INTRAMUSCULAR; INTRAVENOUS at 09:39

## 2019-02-20 RX ADMIN — SUCCINYLCHOLINE CHLORIDE 100 MG: 20 INJECTION INTRAMUSCULAR; INTRAVENOUS at 08:17

## 2019-02-20 RX ADMIN — LIDOCAINE HYDROCHLORIDE 60 MG: 20 INJECTION, SOLUTION EPIDURAL; INFILTRATION; INTRACAUDAL; PERINEURAL at 08:17

## 2019-02-20 RX ADMIN — POLYETHYLENE GLYCOL 3350 17 G: 17 POWDER, FOR SOLUTION ORAL at 13:17

## 2019-02-20 RX ADMIN — MIDAZOLAM HYDROCHLORIDE 2 MG: 1 INJECTION, SOLUTION INTRAMUSCULAR; INTRAVENOUS at 08:14

## 2019-02-20 RX ADMIN — EPHEDRINE SULFATE 5 MG: 50 INJECTION, SOLUTION INTRAVENOUS at 09:28

## 2019-02-20 RX ADMIN — SODIUM CHLORIDE, SODIUM LACTATE, POTASSIUM CHLORIDE, AND CALCIUM CHLORIDE 25 ML/HR: 600; 310; 30; 20 INJECTION, SOLUTION INTRAVENOUS at 07:47

## 2019-02-20 RX ADMIN — SODIUM CHLORIDE, SODIUM LACTATE, POTASSIUM CHLORIDE, AND CALCIUM CHLORIDE: 600; 310; 30; 20 INJECTION, SOLUTION INTRAVENOUS at 10:05

## 2019-02-20 RX ADMIN — FENTANYL CITRATE 100 MCG: 50 INJECTION, SOLUTION INTRAMUSCULAR; INTRAVENOUS at 08:17

## 2019-02-20 RX ADMIN — FENTANYL CITRATE 25 MCG: 50 INJECTION, SOLUTION INTRAMUSCULAR; INTRAVENOUS at 10:29

## 2019-02-20 RX ADMIN — Medication 10 ML: at 13:17

## 2019-02-20 RX ADMIN — LEVOTHYROXINE SODIUM 75 MCG: 75 TABLET ORAL at 13:17

## 2019-02-20 RX ADMIN — Medication 2 G: at 08:28

## 2019-02-20 RX ADMIN — SENNOSIDES AND DOCUSATE SODIUM 1 TABLET: 8.6; 5 TABLET ORAL at 18:12

## 2019-02-20 RX ADMIN — SODIUM CHLORIDE 125 ML/HR: 900 INJECTION, SOLUTION INTRAVENOUS at 10:26

## 2019-02-20 RX ADMIN — FENTANYL CITRATE 25 MCG: 50 INJECTION, SOLUTION INTRAMUSCULAR; INTRAVENOUS at 10:24

## 2019-02-20 RX ADMIN — Medication 2 G: at 16:30

## 2019-02-20 RX ADMIN — ACETAMINOPHEN 650 MG: 325 TABLET ORAL at 18:12

## 2019-02-20 RX ADMIN — SENNOSIDES AND DOCUSATE SODIUM 1 TABLET: 8.6; 5 TABLET ORAL at 13:17

## 2019-02-20 RX ADMIN — OXYCODONE HYDROCHLORIDE 5 MG: 5 TABLET ORAL at 22:19

## 2019-02-20 RX ADMIN — EPHEDRINE SULFATE 5 MG: 50 INJECTION, SOLUTION INTRAVENOUS at 08:27

## 2019-02-20 RX ADMIN — ROCURONIUM BROMIDE 5 MG: 10 INJECTION, SOLUTION INTRAVENOUS at 09:11

## 2019-02-20 RX ADMIN — ACETAMINOPHEN 650 MG: 325 TABLET ORAL at 23:05

## 2019-02-20 RX ADMIN — DEXAMETHASONE SODIUM PHOSPHATE 8 MG: 4 INJECTION, SOLUTION INTRA-ARTICULAR; INTRALESIONAL; INTRAMUSCULAR; INTRAVENOUS; SOFT TISSUE at 08:49

## 2019-02-20 RX ADMIN — FENTANYL CITRATE 25 MCG: 50 INJECTION, SOLUTION INTRAMUSCULAR; INTRAVENOUS at 10:34

## 2019-02-20 RX ADMIN — EPHEDRINE SULFATE 5 MG: 50 INJECTION, SOLUTION INTRAVENOUS at 08:39

## 2019-02-20 RX ADMIN — LIDOCAINE HYDROCHLORIDE 40 MG: 20 INJECTION, SOLUTION EPIDURAL; INFILTRATION; INTRACAUDAL; PERINEURAL at 09:54

## 2019-02-20 RX ADMIN — ACETAMINOPHEN 1000 MG: 10 INJECTION, SOLUTION INTRAVENOUS at 08:55

## 2019-02-20 RX ADMIN — FENTANYL CITRATE 25 MCG: 50 INJECTION, SOLUTION INTRAMUSCULAR; INTRAVENOUS at 10:42

## 2019-02-20 RX ADMIN — OXYCODONE HYDROCHLORIDE 5 MG: 5 TABLET ORAL at 19:11

## 2019-02-20 NOTE — ANESTHESIA PREPROCEDURE EVALUATION
Anesthetic History No history of anesthetic complications Review of Systems / Medical History Patient summary reviewed, nursing notes reviewed and pertinent labs reviewed Pulmonary Within defined limits Neuro/Psych Psychiatric history Comments: Neck pain (M54.2) Back pain (M54.9) Bilateral carpal tunnel syndrome Cardiovascular Hypertension Exercise tolerance: >4 METS 
  
GI/Hepatic/Renal 
Within defined limits Endo/Other Arthritis Other Findings Physical Exam 
 
Airway Mallampati: III 
TM Distance: 4 - 6 cm Neck ROM: normal range of motion Mouth opening: Normal 
 
 Cardiovascular Regular rate and rhythm,  S1 and S2 normal,  no murmur, click, rub, or gallop Rhythm: regular Rate: normal 
 
 
 
 Dental 
No notable dental hx Pulmonary Breath sounds clear to auscultation Abdominal 
Abdominal exam normal 
 
 
 Other Findings Anesthetic Plan ASA: 2 Anesthesia type: general 
 
 
 
 
Induction: Intravenous Anesthetic plan and risks discussed with: Patient Buzz

## 2019-02-20 NOTE — PROGRESS NOTES
Ortho/ NeuroSurgery NP Note POD# 0  s/p 2 LEVEL ANTERIOR CERVICAL DISCECTOMY WITH FUSION C5,6 C6,7 WITH ZERO PROFILE AND CONDUCT MATRIX GRAFT Pt resting in bed. No complaints. Pre-op patient had right arm symptoms greater than the left. These included burning pain in the shoulders and occasional tingling at night in the hand. VSS Afebrile. Patient has not had something to eat/drink. No nausea. Most Recent Labs:  
Lab Results Component Value Date/Time HGB 12.6 02/13/2019 08:45 AM  
 Hemoglobin A1c 5.8 02/13/2019 08:55 AM  
 
 
Body mass index is 23.64 kg/m². Reference: BMI greater than 30 is classified as obesity and greater than 40 is classified as morbid obesity. STOP BANG Score: 2 Voiding status: Patient needs to void today. Dressing c.d.i Calves soft and supple; No pain with passive stretch Sensation and motor intact SCDs for mechanical DVT proph Plan: 
1) Mobilize with nursing. 2) Kaylen-op Antibiotics Ancef 3) Discharge plans to home with her  likely tomorrow. Readiness for discharge: 
   [x] Vital Signs stable  
 [] Hgb stable  
 [] + Voiding  
 [x] Incision intact, drainage minimal  
 [] Tolerating PO intake   
 [] Mobilize with nursing 
 [] Adequate pain control on oral medication alone Tamika Luong NP

## 2019-02-20 NOTE — ANESTHESIA POSTPROCEDURE EVALUATION
Procedure(s): 
2 LEVEL ANTERIOR CERVICAL DISCECTOMY WITH FUSION C5,6 C6,7 WITH ZERO PROFILE AND CONDUCT MATRIX GRAFT. Anesthesia Post Evaluation Patient location during evaluation: PACU Patient participation: complete - patient participated Level of consciousness: awake and alert Pain management: adequate Airway patency: patent Cardiovascular status: acceptable Respiratory status: acceptable Hydration status: acceptable Comments: Seen and ready for discharge Post anesthesia nausea and vomiting:  none Visit Vitals /52 (BP 1 Location: Right arm, BP Patient Position: At rest) Pulse 89 Temp 36.6 °C (97.8 °F) Resp 16 Wt 51.3 kg (113 lb 1.5 oz) SpO2 99% BMI 23.64 kg/m²

## 2019-02-20 NOTE — BRIEF OP NOTE
BRIEF OPERATIVE NOTE Date of Procedure: 2/20/2019 Preoperative Diagnosis: CERVICAL MYELOPATHY, HERNIATED CERVICAL DISC Postoperative Diagnosis: CERVICAL MYELOPATHY, HERNIATED CERVICAL DISC Procedure(s): 
2 LEVEL ANTERIOR CERVICAL DISCECTOMY WITH FUSION C5,6 C6,7 WITH ZERO PROFILE AND CONDUCT MATRIX GRAFT Surgeon(s) and Role: * Sallie Hong MD - Primary Surgical Assistant:  
 
Surgical Staff: 
Circ-1: Idania Nuñez RN Radiology Technician: Joan Moulton Scrub Tech-1: Danica Cr Surg Asst-1: Unruly Hawkins Staff: Joaquim Morris; Estela Pizarro RN Event Time In Time Out Incision Start 4955 Incision Close Anesthesia: General  
Estimated Blood Loss:none Specimens: * No specimens in log * Findings: severe spondylosis both levels with disc herniation, bilateral nerve root compression Complications: *none Implants:  
Implant Name Type Inv. Item Serial No.  Lot No. LRB No. Used Action BONE MTRX SUB CLLGN SPNG 2ML -- CONDUCT 1/EA - SN/A  BONE MTRX SUB CLLGN SPNG 2ML -- CONDUCT 1/EA N/A [employer] [id number] N/A 1 Implanted SPACER SPNE CAGE 7D [id number] -- COALITION - SN/A  SPACER SPNE CAGE 7D [id number] -- COALITION N/A [employer] N/A N/A 2 Implanted SCR SPNE BNE MANDY SD [id number] -- COALITION - SN/A  SCR SPNE BNE MANDY SD 3.6X12MM -- COALITION N/A [employer] N/A N/A 4 Implanted

## 2019-02-20 NOTE — OP NOTES
[facility]  OPERATIVE REPORT    Name:  Michael Blunt  MR#:  [id number]  :  1955  ACCOUNT #:  [de-identified]  DATE OF SERVICE:  2019    PREOPERATIVE DIAGNOSES:  Cervical myelopathy secondary to disk herniations and  spondylosis and cord compression; C5-C6, C6-C7. POSTOPERATIVE DIAGNOSES:  Cervical myelopathy secondary to disk herniations and  spondylosis and cord compression; C5-C6, C6-C7. PROCEDURE PERFORMED:  Two level anterior cervical discectomy and fusion with  Zero-Profile graft at C5-C6, C6-C7. SURGEON:  Teresa Bautista MD    ASSISTANT:  Mary Ann Clemons SA    ANESTHESIA:  General.    COMPLICATIONS:  None. SPECIMENS REMOVED:  _____. IMPLANTS:  Zero-Profile Coalition graft with Conduct Matrix sponge at 2 levels,  C5-C6, C6-C7. ESTIMATED BLOOD LOSS:  Minimal.    OPERATIVE PROCEDURE:  Review of the imaging studies revealed 2 level cervical  spondylosis and cord compression at C5-C6, C6-C7 on this patient who did not improve  with conservative measures. After discussing risks, benefits and alternatives of  surgery with her, she wished to proceed with surgery. She was taken to the operating  room where she was induced by general endotracheal anesthesia, placed on the  operating table in a supine position. A shoulder roll was placed beneath both  shoulders, anterior cervi umm region. Scrubbed, prepped and draped in usual sterile  fashion with a head resting in a neutral position on a circular donut headrest.  A  time-out was performed to identify the correct patient and procedure. Appropriate  antibiotics were administered prior to making the incisions. Sequential stocking was  applied for DVT prophylaxis. A small horizontal incision extending from the midline  over the anterior border of the sternocleidomastoid on the right side was made at the  level of the cricoid cartilage. An avascular plane was developed on to the anterior  cervical spine.   A blunted needle was placed in the C6-C7 interspace and x-ray was  obtained to confirm position. Tyrel Naegeli were placed in the C5, C6 and C7 and  vertebral bodies respectively, working first at the C5-C6 interspace under loupe  magnification. The disk was incised with a #15 blade scalpel and anterior  osteophytes were removed in order to improve the exposure. The interspace was  distracted. Disk material was removed with pituitary rongeurs and curetted down to  the posterior ligament, but large posterior osteophytes have to be removed first.   With 1 and 2 mm Kerrison rongeurs, the posterior ligament was opened with a 1 mm  Kerrison and extended laterally in both directions until the nerve roots were felt to  be well compressed. The endplates of the vertebral bodies were scraped with the  curette. A high speed [employer] Uri drill was then used to shave down the surfaces at  the interspace in order to prepare those surfaces for application of the graft. Attention was then directed to the C6-C7 interspace where larger anterior osteophytes  have to be removed in order to improve the exposure. The interspace was distracted  with Tyrel Naegeli. Disk material was removed with pituitary rongeurs and curettes. Again, large posterior osteophytes corresponding to the imaging studies were removed  with 1 and 2 mm Kerrison rongeurs, until the nerve roots were felt to be well  decompressed bilaterally and there was no longer any compression of the cervical cord  centrally. The posterior ligament was opened with a 1 mm Kerrison and extended  laterally in both directions again until it was noted that the anterior canal and  nerve roots were well decompressed. Again the endplates were scraped with the  curette and shaved down slightly with high speed drill. The bone dust was added to  the Conduct Matrix sponges which were placed in both Zero-Profile grafts which were 6  mm high, working first at the C6-C7 interspace.   One of the graft was placed,  countersunk 1 to 2 mm and secured in place with two 12 mm screws respectively. Attention was then directed to the C5-C6 interspace where in a similar fashion at 6  mm high graft with Coalition sponge soaked in the patient's blood and local bone  added to it was placed in the interspace, countersunk 1 to 2 mm and secured with 12  mm screws above and below. The constructs were locked down and x-ray was obtained to  confirm position. The Nata were removed. Bone wax was placed in that site. The incision was inspected, irrigated copiously with antibiotic irrigation and then  closed in layers. The platysma with 3-0 interrupted inverted Vicryl sutures, the  subcuticular layer with also 3-0 interrupted inverted Vicryl sutures and a running  4-0 Monocryl. Steri-Strips were applied to the skin edges and a sterile dressing was  applied. The needle, sponge and instrument count were correct at the end of the  procedure. There were no complications. There was minimal blood loss.         Pieter Porter MD      [id number]  D:  02/20/2019 10:11  T:  02/20/2019 16:11  JOB #:  [id number]  CC:  MD Lawrence Watson MD

## 2019-02-20 NOTE — PERIOP NOTES
TRANSFER - OUT REPORT: 
 
Verbal report given to Emma Beach RN (name) on Kyara Kennedy  being transferred to [id number] [facility] (unit) for routine progression of care Report consisted of patients Situation, Background, Assessment and  
Recommendations(SBAR). Information from the following report(s) SBAR, OR Summary, Intake/Output, MAR, Recent Results and Cardiac Rhythm NSR was reviewed with the receiving nurse. Opportunity for questions and clarification was provided. Patient transported with: 
 O2 @ 2 liters Tech

## 2019-02-20 NOTE — PERIOP NOTES
Handoff Report from Operating Room to PACU Report received from RA Cortez RN and Afia WILLARD  regarding Rock Hayes. Surgeon(s): 
Mattie Barrios MD  And Procedure(s) (LRB): 
2 LEVEL ANTERIOR CERVICAL DISCECTOMY WITH FUSION C5,6 C6,7 WITH ZERO PROFILE AND CONDUCT MATRIX GRAFT (N/A)  confirmed  
with allergies and dressings discussed. Anesthesia type, drugs, patient history, complications, estimated blood loss, vital signs, intake and output, and last pain medication, lines, reversal medications and temperature were reviewed.

## 2019-02-21 VITALS
WEIGHT: 113.1 LBS | BODY MASS INDEX: 23.64 KG/M2 | TEMPERATURE: 97.8 F | RESPIRATION RATE: 16 BRPM | OXYGEN SATURATION: 95 % | SYSTOLIC BLOOD PRESSURE: 110 MMHG | DIASTOLIC BLOOD PRESSURE: 66 MMHG | HEART RATE: 75 BPM

## 2019-02-21 PROCEDURE — 99218 HC RM OBSERVATION: CPT

## 2019-02-21 PROCEDURE — 74011250637 HC RX REV CODE- 250/637: Performed by: NEUROLOGICAL SURGERY

## 2019-02-21 RX ORDER — POLYETHYLENE GLYCOL 3350 17 G/17G
17 POWDER, FOR SOLUTION ORAL
Qty: 15 PACKET | Refills: 0 | Status: SHIPPED | OUTPATIENT
Start: 2019-02-21 | End: 2019-03-08

## 2019-02-21 RX ORDER — AMOXICILLIN 250 MG
1 CAPSULE ORAL DAILY
Qty: 30 TAB | Refills: 0 | Status: SHIPPED | OUTPATIENT
Start: 2019-02-21 | End: 2019-10-31 | Stop reason: ALTCHOICE

## 2019-02-21 RX ORDER — ACETAMINOPHEN 325 MG/1
650 TABLET ORAL EVERY 6 HOURS
Qty: 112 TAB | Refills: 0 | Status: SHIPPED | OUTPATIENT
Start: 2019-02-21 | End: 2019-03-07

## 2019-02-21 RX ORDER — OXYCODONE HYDROCHLORIDE 5 MG/1
5-10 TABLET ORAL
Qty: 35 TAB | Refills: 0 | Status: SHIPPED | OUTPATIENT
Start: 2019-02-21 | End: 2019-03-08 | Stop reason: ALTCHOICE

## 2019-02-21 RX ADMIN — OXYCODONE HYDROCHLORIDE 5 MG: 5 TABLET ORAL at 02:44

## 2019-02-21 RX ADMIN — ACETAMINOPHEN 650 MG: 325 TABLET ORAL at 12:42

## 2019-02-21 RX ADMIN — Medication 10 ML: at 06:22

## 2019-02-21 RX ADMIN — OXYCODONE HYDROCHLORIDE 5 MG: 5 TABLET ORAL at 06:19

## 2019-02-21 RX ADMIN — SENNOSIDES AND DOCUSATE SODIUM 1 TABLET: 8.6; 5 TABLET ORAL at 09:13

## 2019-02-21 RX ADMIN — POLYETHYLENE GLYCOL 3350 17 G: 17 POWDER, FOR SOLUTION ORAL at 09:13

## 2019-02-21 RX ADMIN — ATORVASTATIN CALCIUM 10 MG: 10 TABLET, FILM COATED ORAL at 09:13

## 2019-02-21 RX ADMIN — OXYCODONE HYDROCHLORIDE 5 MG: 5 TABLET ORAL at 12:42

## 2019-02-21 RX ADMIN — LEVOTHYROXINE SODIUM 75 MCG: 75 TABLET ORAL at 06:19

## 2019-02-21 RX ADMIN — OXYCODONE HYDROCHLORIDE 5 MG: 5 TABLET ORAL at 09:14

## 2019-02-21 RX ADMIN — ACETAMINOPHEN 650 MG: 325 TABLET ORAL at 06:19

## 2019-02-21 NOTE — DISCHARGE INSTRUCTIONS
Unruly Jarrell M.D. What can I do?  The only exercise you should do is walking. Start by walking twice a day for 5 minutes, then increase by  2-3 minutes every day until you reach 25 minutes twice a day. DO NOT sit for long periods of time (20 minutes at a time for the first couple of weeks, then gradually increase.  No heavy lifting (5 lbs max); no straining, twisting, or bending.  No driving until your physician tells you it is ok. It is, however, ok to ride short distances in a car.  If you are required to wear a back brace, you may remove it when you are sleeping unless your doctor has advised against it.  If you are required to wear a cervical collar, you must sleep in it. You can remove it only for showers. What can I eat?  You may resume your regular home diet as tolerated. If your throat is sore, you may want to eat soft food for the first few days. When can I take a shower?  You may shower leaving on your occlusive (waterproof) dressing on allowing water to run over the dressing. The dressing may be removed in 5 days. The small pieces of tape (steri strips)  underneath it should stay on. Let water run over them, then pat dry gently.  Do not take baths or soak in pools.  You may remove your brace for showering. Medications:   Check with your physician before taking any anti-inflammatory medicines (Advil, Aleve, ibuprofen, aspirin).  Take prescription medicine as directed. DO NOT take more than prescribed. Call your physician if the prescribed dose is not sufficiently controlling your pain.  It is important to have regular bowel movements. Pain medications may cause constipation. Drink plenty of fluids, get enough fiber in your diet, and use stool softeners and drink prune juice to help prevent constipation. Do not take laxatives if at all possible except in severe situations.   It can result in a vicious cycle of constipation and diarrhea.  Do not put any ointments or creams on your incision unless directed to do so by your physician.  Tobacco products should be avoided during the postoperative phase. When do I see the physician again?  Please call your physicians office as soon as you get home to schedule your 1st post operative appointment. You should be seen approximately two weeks after your surgery. At this appointment you will see your doctors Assistant for a wound check and to answer any questions you may have.  You will see your physician approximately six weeks after your surgery.  If you had a fusion, you will need to get an order for xrays to be taken prior to the six week appointment. These should be done on the day of the appointment or 1-2 days before.  If you need the number to your doctors office, please request it from your nurse or see below. NOTIFY YOUR PHYSICIAN OF ANY OF THE FOLLOWING:     Fever above 101º for 24 hrs.  Nausea or vomiting.  Inability to urinate   Loss of bowel or bladder function (sudden onset of incontinence)   Changes in sensation (numbness, tingling, color change) in your extremities   Pain not relieved by your medicine.    Redness, swelling or drainage from your incision   Persistent pain in the calf of either leg   Development of severe pain      FOR APPOINTMENTS OR QUESTIONS CALL:    [facility] [address] [place] [address]  [phone]

## 2019-02-21 NOTE — PROGRESS NOTES
Ortho / Neurosurgery NP Note POD# 1  s/p 2 LEVEL ANTERIOR CERVICAL DISCECTOMY WITH FUSION C5,6 C6,7 WITH ZERO PROFILE AND CONDUCT MATRIX GRAFT Pt seen with Jorge A Galaviz Pt resting in bed. No complaints of nausea, dizziness with ambulation. prior to surgery symptoms were neck pain radiating to her right shoulder with tingling and numbness down thru her right arm and fingers. Now she only reports minimal neck pain. She is more concerned about her dressing feeling too tight on her neck. Dressing CDI. Minimal swelling . Voiding ad passing flatus. VSS Afebrile. Voiding status:+voids Labs Lab Results Component Value Date/Time HGB 12.6 02/13/2019 08:45 AM  
  
Lab Results Component Value Date/Time INR 1.0 02/13/2019 08:45 AM  
  
 
Body mass index is 23.64 kg/m². : A BMI > 30 is classified as obesity and > 40 is classified as morbid obesity. Dressing c.d.i Calves soft and supple; No pain with passive stretch Sensation and motor intact SCDs for mechanical DVT proph while in bed PLAN: 
1) PT BID 2) Readniess for discharge: 
   [x] Vital Signs stable  
 [x] Hgb stable  
 [x] + Voiding  
 [x] Wound intact, drainage minimal  
 [x] Tolerating PO intake   
 [] Cleared by PT (OT if applicable) [] Stair training completed (if applicable) [] Independent / Contact Guard Assist (household distance) [] Bed mobility [] Car transfers  
  [] ADLs [x] Adequate pain control on oral medication alone Plan 
discharge home pending OT clears. Soledad Weinberg [employer] [facility] [employer] student

## 2019-02-21 NOTE — PROGRESS NOTES
Bedside shift change report given to Tramaine Lou RN (oncoming nurse) by Mae Clement RN (offgoing nurse). Report included the following information SBAR, Kardex, Procedure Summary, Intake/Output, MAR, Accordion, Recent Results and Med Rec Status.

## 2019-02-21 NOTE — PROGRESS NOTES
Orthopedic End of Shift Note Bedside and Verbal shift change [facility] ([facility] by Mónica Gillette RN (offgoing nurse). Report included the following information SBAR, Kardex, Intake/Output, MAR and Recent Results. POD# 1 Significant issues during shift: C/o pain at surgical site. Oxycodone given throughout shift. Dressing clean, dry and intact. Issues for Physician to address: C/o pain at surgical site. Oxycodone given throughout shift. Dressing clean, dry and intact. Activity This Shift [] chair 
[] dangle 
 [] bathroom 
[] bedside commode [] hallway [x] bedrest  
Nausea/Vomiting [x] yes [] no    
Voiding Status [x] void [] Akbar [x] I&O Bowel Movements [] yes [] no Foot Pumps or SCD [] yes [] no Ice Pack [] yes    [x] no Incentive Spirometer [x] yes [] no Volume:  1200 Telemetry Monitoring   [] yes [] no Rhythm:   
Supplemental O2 [] yes [] no Sat off O2:   98%

## 2019-02-21 NOTE — PROGRESS NOTES
I have reviewed discharge instructions with the spouse. The spouse verbalized understanding and opportunity was given for questions. Dressing changed and IV D/C per protocol. Patient taken to car via wheelchair. Care terminated at this time.

## 2019-02-21 NOTE — PROGRESS NOTES
Problem: Falls - Risk of 
Goal: *Absence of Falls Document Liat Coto Fall Risk and appropriate interventions in the flowsheet. Outcome: Progressing Towards Goal 
Fall Risk Interventions: 
  
 
  
 
Medication Interventions: Assess postural VS orthostatic hypotension Elimination Interventions: Call light in reach

## 2019-02-27 ENCOUNTER — OFFICE VISIT (OUTPATIENT)
Dept: INTERNAL MEDICINE CLINIC | Age: 64
End: 2019-02-27

## 2019-02-27 VITALS
HEIGHT: 58 IN | OXYGEN SATURATION: 98 % | WEIGHT: 114.6 LBS | TEMPERATURE: 98.2 F | HEART RATE: 73 BPM | BODY MASS INDEX: 24.05 KG/M2 | SYSTOLIC BLOOD PRESSURE: 116 MMHG | DIASTOLIC BLOOD PRESSURE: 78 MMHG

## 2019-02-27 DIAGNOSIS — R11.2 NAUSEA AND VOMITING, INTRACTABILITY OF VOMITING NOT SPECIFIED, UNSPECIFIED VOMITING TYPE: ICD-10-CM

## 2019-02-27 DIAGNOSIS — M48.02 CERVICAL SPINAL STENOSIS: ICD-10-CM

## 2019-02-27 DIAGNOSIS — N30.00 ACUTE CYSTITIS WITHOUT HEMATURIA: Primary | ICD-10-CM

## 2019-02-27 LAB
BILIRUB UR QL: NEGATIVE
CLARITY: CLEAR
COLOR UR: ABNORMAL
ERYTHROCYTE [DISTWIDTH] IN BLOOD BY AUTOMATED COUNT: 12.3 %
GLUCOSE 24H UR-MRATE: NEGATIVE G/(24.H)
HCT VFR BLD AUTO: 38.5 % (ref 37–51)
HGB BLD-MCNC: 12.7 G/DL (ref 12–18)
HGB UR QL STRIP: NEGATIVE
KETONES UR QL STRIP.AUTO: NEGATIVE
LEUKOCYTE ESTERASE: NEGATIVE
LYMPHOCYTES ABSOLUTE: 2.4 K/UL (ref 0.6–4.1)
LYMPHOCYTES NFR BLD: 32.3 % (ref 10–58.5)
MCH RBC QN AUTO: 31 PG (ref 26–32)
MCHC RBC AUTO-ENTMCNC: 33 G/DL (ref 30–36)
MCV RBC AUTO: 93.9 FL (ref 80–97)
MONOCYTES ABS-DIF,2141: 0.7 K/UL (ref 0–1.8)
MONOCYTES NFR BLD: 9.5 % (ref 0.1–24)
NEUTROPHILS # BLD: 58.2 % (ref 37–92)
NEUTROPHILS ABS,2156: 4.3 K/UL (ref 2–7.8)
NITRITE UR QL STRIP.AUTO: NEGATIVE
PH UR STRIP: 6 [PH] (ref 5–7)
PLATELET # BLD AUTO: 322 K/UL (ref 140–440)
PMV BLD AUTO: 7.6 FL
PROT UR STRIP-MCNC: NEGATIVE MG/DL
RBC # BLD AUTO: 4.1 M/UL (ref 4.2–6.3)
SP GR UR REFRACTOMETRY: 1.01 (ref 1–1.03)
UROBILINOGEN UR QL STRIP.AUTO: NEGATIVE
WBC # BLD AUTO: 7.4 K/UL (ref 4.1–10.9)

## 2019-02-27 RX ORDER — SULFAMETHOXAZOLE AND TRIMETHOPRIM 800; 160 MG/1; MG/1
TABLET ORAL
Refills: 0 | COMMUNITY
Start: 2019-02-25 | End: 2019-02-27 | Stop reason: ALTCHOICE

## 2019-02-27 RX ORDER — ONDANSETRON 4 MG/1
4 TABLET, ORALLY DISINTEGRATING ORAL
Qty: 6 TAB | Refills: 0 | Status: SHIPPED | OUTPATIENT
Start: 2019-02-27 | End: 2019-04-18 | Stop reason: ALTCHOICE

## 2019-02-27 RX ORDER — PHENAZOPYRIDINE HYDROCHLORIDE 200 MG/1
TABLET, FILM COATED ORAL
Refills: 0 | COMMUNITY
Start: 2019-02-25 | End: 2019-03-08 | Stop reason: ALTCHOICE

## 2019-02-27 RX ORDER — NITROFURANTOIN 25; 75 MG/1; MG/1
100 CAPSULE ORAL 2 TIMES DAILY
Qty: 10 CAP | Refills: 0 | Status: SHIPPED | OUTPATIENT
Start: 2019-02-27 | End: 2019-03-08 | Stop reason: ALTCHOICE

## 2019-02-27 NOTE — PATIENT INSTRUCTIONS
Cervical Discectomy: What to Expect at [facility] Your Recovery You can expect your neck to feel stiff or sore after surgery. This should improve in the weeks after surgery. You may have trouble sitting or standing in one position for very long and may need pain medicine in the weeks after your surgery. It may take up to 8 weeks to get back to your usual activities, but it may depend on what kind of surgery you had. Your doctor may advise you to work with a physical therapist to strengthen the muscles around your neck and back. This care sheet gives you a general idea about how long it will take for you to recover. But each person recovers at a different pace. Follow the steps below to get better as quickly as possible. How can you care for yourself at home? Activity 
  · Rest when you feel tired. Getting enough sleep will help you recover.  
  · Try to walk each day. Start by walking a little more than you did the day before. Bit by bit, increase the amount you walk. Walking boosts blood flow and helps prevent pneumonia and constipation. Walking may also decrease your muscle soreness after surgery.  
  · Avoid lifting anything that would make you strain. This may include grocery bags and milk containers, a heavy briefcase or backpack, cat litter or dog food bags, a vacuum , or a child.  
  · Avoid strenuous activities, such as bicycle riding, jogging, weightlifting, or aerobic exercise, until your doctor says it is okay.  
  · Ask your doctor when you can drive again.  
  · Avoid taking long car trips for 2 to 4 weeks after surgery. Your neck may become tired and painful from sitting too long in one position.  
  · Your time off from work depends on how quickly you feel better and on the type of work you do. If you work in an office, you likely can go back to work sooner than if you have a job where you are very active. Talk with your doctor about your work needs.   · You may have sex as soon as you feel able, but avoid positions that put stress on your neck or cause pain. Diet 
  · You can eat your normal diet. If your stomach is upset, try bland, low-fat foods like plain rice, broiled chicken, toast, and yogurt.  
  · Drink plenty of fluids (unless your doctor tells you not to).  
  · You may notice that your bowel movements are not regular right after your surgery. This is common. Try to avoid constipation and straining with bowel movements. You may want to take a fiber supplement every day. If you have not had a bowel movement after a couple of days, ask your doctor about taking a mild laxative. Medicines 
  · Be safe with medicines. Take pain medicines exactly as directed. ? If the doctor gave you a prescription medicine for pain, take it as prescribed. ? If you are not taking a prescription pain medicine, ask your doctor if you can take an over-the-counter medicine.  
  · If your doctor prescribed antibiotics, take them as directed. Do not stop taking them just because you feel better. You need to take the full course of antibiotics.  
  · If you think your pain medicine is making you sick to your stomach: 
? Take your medicine after meals (unless your doctor has told you not to). ? Ask your doctor for a different pain medicine. Incision care 
  · If you have strips of tape on the cut (incision) the doctor made, leave the tape on for a week or until it falls off.  
  · Wash the area daily with warm, soapy water, and pat it dry. Don't use hydrogen peroxide or alcohol, which can slow healing. You may cover the area with a gauze bandage if it weeps or rubs against clothing. Change the dressing every day.  
  · Keep the area clean and dry. Exercise 
  · Do exercises as instructed by your doctor or physical therapist to improve your strength and flexibility.   
Other instructions 
  · To reduce stiffness and help sore muscles, use a warm water bottle, a heating pad set on low, or a warm cloth on your neck. Do not put heat right over the incision. Do not go to sleep with a heating pad on your skin. Follow-up care is a key part of your treatment and safety. Be sure to make and go to all appointments, and call your doctor if you are having problems. It's also a good idea to know your test results and keep a list of the medicines you take. When should you call for help? Call 911 anytime you think you may need emergency care. For example, call if: 
  · You passed out (lost consciousness).  
  · You have chest pain, are short of breath, or cough up blood.  
  · You are unable to move an arm or a leg at all.  
[facility] your doctor now or seek immediate medical care if: 
  · You have pain that does not get better after you take pain medicine.  
  · You have loose stitches, or your incision comes open.  
  · Bright red blood has soaked through the bandage over your incision.  
  · You have signs of a blood clot in your leg (called a deep vein thrombosis), such as: 
? Pain in your calf, back of the knee, thigh, or groin. ? Redness or swelling in your leg.  
  · You have signs of infection, such as: 
? Increased pain, swelling, warmth, or redness. ? Red streaks leading from the incision. ? Pus draining from the incision. ? A fever.  
  · You have new or worse symptoms in your arms, legs, chest, belly, or buttocks. Symptoms may include: 
? Numbness or tingling. ? Weakness. ? Pain.  
  · You lose bladder or bowel control.  
 Watch closely for any changes in your health, and be sure to contact your doctor if: 
  · You do not get better as expected. Where can you learn more? Go to http://rose-velvet.info/. Enter R489 in the search box to learn more about \"Cervical Discectomy: What to Expect at Home. \" Current as of: September 20, 2018 Content Version: 11.9 © 1403-4890 [employer].  Care instructions adapted under license by [address] (which disclaims liability or warranty for this information). If you have questions about a medical condition or this instruction, always ask your healthcare professional. [address] any warranty or liability for your use of this information.

## 2019-02-27 NOTE — PROGRESS NOTES
Roro Israel is a 58 y.o. female presenting for Flu Like Symptoms and Bladder Infection Larry Lord 1. Have you been to the ER, urgent care clinic since your last visit? Hospitalized since your last visit? Yes When: 2-25-19 Where: [facility] Reason for visit: UTI. 2. Have you seen or consulted any other health care providers outside of the [address] [place] [address] since your last visit? Include any pap smears or colon screening. No 
 
Fall Risk Assessment, last 12 mths 1/10/2019 Able to walk? Yes Fall in past 12 months? Yes Fall with injury? No  
Number of falls in past 12 months 1 Fall Risk Score 1 Abuse Screening Questionnaire 1/10/2019 Do you ever feel afraid of your partner? Giles Rosales Are you in a relationship with someone who physically or mentally threatens you? Giles Rosales Is it safe for you to go home? Y  
 
 
3 most recent PHQ Screens 1/22/2019 Little interest or pleasure in doing things Not at all Feeling down, depressed, irritable, or hopeless Several days Total Score PHQ 2 1 Feeling tired or having little energy - Poor appetite, weight loss, or overeating - Feeling bad about yourself - or that you are a failure or have let yourself or your family down - Trouble concentrating on things such as school, work, reading, or watching TV - Moving or speaking so slowly that other people could have noticed; or the opposite being so fidgety that others notice - Thoughts of being better off dead, or hurting yourself in some way - There are no discontinued medications.

## 2019-02-27 NOTE — PROGRESS NOTES
Chief Complaint Patient presents with  Flu Like Symptoms  Bladder Infection SUBJECTIVE: 
 
Kyara Kennedy is a 58 y.o. female who returns today in follow-up from her recent hospitalization when she was admitted with a cervical discectomy secondary to cervical disc herniation and discharged on the 21st.  She 2 days later ended up developing some symptoms that seem like urinary symptoms and finally went to [facility] on the 25th at which time she was started on Bactrim and a urine was obtained and she was told she had a UTI. Culture was sent that she does not a result of that. This morning she developed some problems with nausea and vomiting which she had after she took her pill and was not able to keep that pill down. She has not had any nausea vomiting since this morning although she feels a little queasy she has not thrown up. She notes no diarrhea and abdominal pain. She is concerned about the possibility of having a GI issue since she did have a grandkid over this weekend who had a friend who had some nausea vomiting. As far as recovery from the surgery on her neck she seems to be doing well from that standpoint is noted no numbness or weakness in her arms or legs. She notes no significant swelling and no problems with speech or other issues. She currently denies any cardiorespiratory complaints including no cough, shortness of breath or palpitations, and her chronic upper respiratory nasal drainage which is clear is unchanged. Current Outpatient Medications Medication Sig Dispense Refill  phenazopyridine (PYRIDIUM) 200 mg tablet TAKE 1 TABLET BY MOUTH THREE TIMES A DAY FOR 2 DAYS  0  
 ondansetron (ZOFRAN ODT) 4 mg disintegrating tablet Take 1 Tab by mouth every eight (8) hours as needed for Nausea. 6 Tab 0  
 nitrofurantoin, macrocrystal-monohydrate, (MACROBID) 100 mg capsule Take 1 Cap by mouth two (2) times a day.  10 Cap 0  
  acetaminophen (TYLENOL) 325 mg tablet Take 2 Tabs by mouth every six (6) hours for 14 days. 112 Tab 0  
 oxyCODONE IR (ROXICODONE) 5 mg immediate release tablet Take 1-2 Tabs by mouth every four (4) hours as needed. Max Daily Amount: 60 mg. 35 Tab 0  
 polyethylene glycol (MIRALAX) 17 gram packet Take 1 Packet by mouth daily as needed (constipation) for up to 15 days. 15 Packet 0  
 senna-docusate (PERICOLACE) 8.6-50 mg per tablet Take 1 Tab by mouth daily. 30 Tab 0  
 diclofenac EC (VOLTAREN) 75 mg EC tablet TAKE 1 TABLET BY MOUTH TWICE A  Tab 3  
 atorvastatin (LIPITOR) 10 mg tablet Take 1 Tab by mouth daily. 90 Tab 3  
 levothyroxine (SYNTHROID) 125 mcg tablet TAKE 1 TABLET BY MOUTH EVERY DAY 90 Tab 3  
 multivitamin (ONE A DAY) tablet Take 1 Tab by mouth daily.  alendronate (FOSAMAX) 70 mg tablet Take 70 mg by mouth.  sertraline (ZOLOFT) 50 mg tablet TAKE 1 TABLET(S) EVERY EVENING BY ORAL ROUTE.100mg  1 Past Medical History:  
Diagnosis Date  Adverse effect of anesthesia Pt states her daughter had an Anaphylactic reaction to Anesthesia  Arthritis  Back pain  Hypertension Single episode  Menopause  Neck pain  Psychiatric disorder Depression, Anxiety  Thyroid disease Past Surgical History:  
Procedure Laterality Date  HX ORTHOPAEDIC Rotator Cuff Repair, Right  HX ORTHOPAEDIC Carpal Tunnel Repair, Right No Known Allergies REVIEW OF SYSTEMS: 
General: negative for - chills or fever, or weight loss or gain ENT: negative for - headaches, nasal congestion or tinnitus Eyes: no blurred or visual changes Neck: No stiffness or swollen nodes. Surgical scar right anterior cervical area healing well Steri-Strips in place Respiratory: negative for - cough, hemoptysis, shortness of breath or wheezing Cardiovascular : negative for - chest pain, edema, palpitations or shortness of breath Gastrointestinal: negative for - abdominal pain, blood in stools, heartburn but positive for nausea vomiting once this morning with a queasy feeling since Genito-Urinary: Positive for dysuria trouble voiding, or hematuria Musculoskeletal: negative for - gait disturbance, joint pain, joint stiffness or joint swelling Neurological: no TIA or stroke symptoms Hematologic: no bruises, no bleeding Lymphatic: no swollen glands Integument: no lumps, mole changes, nail changes or rash Endocrine:no malaise/lethargy poly uria or polydipsia or unexpected weight changes Social History Socioeconomic History  Marital status:  Spouse name: Not on file  Number of children: Not on file  Years of education: Not on file  Highest education level: Not on file Tobacco Use  Smoking status: Never Smoker  Smokeless tobacco: Never Used Substance and Sexual Activity  Alcohol use: Yes Comment: 3 Drinks per month  Drug use: No  
 Sexual activity: Not Currently Family History Problem Relation Age of Onset  Breast Cancer Paternal Grandmother   
     over 48  Stroke Mother  Heart Attack Father OBJECTIVE:  
 
Visit Vitals /78 (BP 1 Location: Left arm, BP Patient Position: Sitting) Pulse 73 Temp 98.2 °F (36.8 °C) (Oral) Ht 4' 10\" (1.473 m) Wt 114 lb 9.6 oz (52 kg) SpO2 98% BMI 23.95 kg/m² CONSTITUTIONAL:   well nourished, appears age appropriate EYES: sclera anicteric, PERRL, EOMI 
ENMT:nares clear, moist mucous membranes, pharynx clear NECK: supple. Thyroid normal, No JVD or bruits RESPIRATORY: Chest: clear to ascultation and percussion, normal inspiratory effort CARDIOVASCULAR: Heart: regular rate and rhythm no murmurs, rubs or gallops, PMI not displaced, No thrills GASTROINTESTINAL: Abdomen: non distended, soft, non tender, bowel sounds normal 
HEMATOLOGIC: no purpura, petechiae or bruising LYMPHATIC: No lymph node enlargemant MUSCULOSKELETAL: Extremities: no edema or active synovitis, pulse 1+ INTEGUMENT: No unusual rashes or suspicious skin lesions noted. Nails appear normal. 
PERIPHERAL VASCULAR: normal pulses femoral, PT and DP NEUROLOGIC: non-focal exam, A & O X 3 PSYCHIATRIC:, appropriate affect ASSESSMENT:  
1. Acute cystitis without hematuria 2. Nausea and vomiting, intractability of vomiting not specified, unspecified vomiting type 3. Cervical spinal stenosis Impression 1. Acute cystitis she still has symptoms consistent with this and I do not think she can tolerate Bactrim so I am switching her to Macrobid 100 twice daily for 7 days 2. Nausea vomiting probably from Bactrim but I did give her some Zofran ODT 4 mg tablets #6 1 4 times daily as needed 3. Cervical spinal stenosis status post recent laminectomy discectomy and doing well She would notify me if not improved with above treatment Follow-up as previously scheduled PLAN: 
. Orders Placed This Encounter  CULTURE, URINE  CBC WITH AUTOMATED DIFF ([facility])  URINALYSIS W/O MICRO ([facility])  DISCONTD: trimethoprim-sulfamethoxazole (BACTRIM DS, SEPTRA DS) 160-800 mg per tablet  phenazopyridine (PYRIDIUM) 200 mg tablet  ondansetron (ZOFRAN ODT) 4 mg disintegrating tablet  nitrofurantoin, macrocrystal-monohydrate, (MACROBID) 100 mg capsule ATTENTION:  
This medical record was transcribed using an electronic medical records system. Although proofread, it may and can contain electronic and spelling errors. Other human spelling and other errors may be present. Corrections may be executed at a later time. Please feel free to contact us for any clarifications as needed. Follow-up Disposition: 
Return TBD. Results for orders placed or performed in visit on 02/27/19 CBC WITH AUTOMATED DIFF Result Value Ref Range WBC 7.4 4.1 - 10.9 K/uL  
 RBC 4.10 (L) 4.20 - 6.30 M/uL HGB 12.7 12.0 - 18.0 g/dL HCT 38.5 37.0 - 51.0 % MCH 31.0 26.0 - 32.0 pg  
 MCHC 33.0 30.0 - 36.0 g/dL MCV 93.9 80.0 - 97.0 fL  
 RDW 12.3 % PLATELET 177.3 066.9 - 440.0 K/uL MEAN PLATELET VOLUME 7.6 fL  
 Neutrophils abs 4.3 2.0 - 7.8 K/uL Neutrophils 58.2 37.0 - 92.0 % Monocytes abs-DIF 0.7 0.0 - 1.8 K/uL MONOCYTES 9.5 0.1 - 24.0 % Lymphocytes Absolute 2.4 0.6 - 4.1 K/uL LYMPHOCYTES 32.3 10.0 - 58.5 % URINALYSIS W/O MICRO Result Value Ref Range Color twyla (A) Pale Yellow - Yellow CLARITY clear Clear Glucose urine, 24 hr Negative Negative Ketone Negative Negative Bilirubin Negative Negative Specific gravity 1.015 1.000 - 1.030  
 pH (UA) 6 5 - 7 Blood Negative Negative Protein Negative Negative Urobilinogen Negative Negative Nitrites Negative Negative Leukocyte Esterase Negative Negative Haylie Lagunas MD 
 
The patient verbalized understanding of the problems and plans as explained.

## 2019-03-01 LAB — BACTERIA UR CULT: NO GROWTH

## 2019-03-08 ENCOUNTER — OFFICE VISIT (OUTPATIENT)
Dept: INTERNAL MEDICINE CLINIC | Age: 64
End: 2019-03-08

## 2019-03-08 VITALS
WEIGHT: 115 LBS | RESPIRATION RATE: 16 BRPM | DIASTOLIC BLOOD PRESSURE: 89 MMHG | SYSTOLIC BLOOD PRESSURE: 130 MMHG | TEMPERATURE: 98 F | BODY MASS INDEX: 24.14 KG/M2 | OXYGEN SATURATION: 97 % | HEIGHT: 58 IN | HEART RATE: 78 BPM

## 2019-03-08 DIAGNOSIS — N39.0 URINARY TRACT INFECTION WITHOUT HEMATURIA, SITE UNSPECIFIED: Primary | ICD-10-CM

## 2019-03-08 LAB
BACTERIA,BACTU: ABNORMAL
BILIRUB UR QL: NEGATIVE
CLARITY: ABNORMAL
COLOR UR: ABNORMAL
GLUCOSE 24H UR-MRATE: NEGATIVE G/(24.H)
HGB UR QL STRIP: ABNORMAL
KETONES UR QL STRIP.AUTO: NEGATIVE
LEUKOCYTE ESTERASE: ABNORMAL
NITRITE UR QL STRIP.AUTO: ABNORMAL
PH UR STRIP: 6 [PH] (ref 5–7)
PROT UR STRIP-MCNC: NEGATIVE MG/DL
RBC #/AREA URNS HPF: ABNORMAL #/HPF
SP GR UR REFRACTOMETRY: 1.02 (ref 1–1.03)
SQUAMOUS EPITHELIAL CELLS: ABNORMAL
UROBILINOGEN UR QL STRIP.AUTO: ABNORMAL
WBC URNS QL MICRO: ABNORMAL #/HPF

## 2019-03-08 RX ORDER — PHENAZOPYRIDINE HYDROCHLORIDE 200 MG/1
200 TABLET, FILM COATED ORAL
Qty: 9 TAB | Refills: 0 | Status: SHIPPED | OUTPATIENT
Start: 2019-03-08 | End: 2019-03-11

## 2019-03-08 RX ORDER — CIPROFLOXACIN 250 MG/1
250 TABLET, FILM COATED ORAL 2 TIMES DAILY
Qty: 14 TAB | Refills: 0 | Status: SHIPPED | OUTPATIENT
Start: 2019-03-08 | End: 2019-04-18 | Stop reason: ALTCHOICE

## 2019-03-08 NOTE — PATIENT INSTRUCTIONS
Urinary Tract Infection in Women: Care Instructions  Your Care Instructions    A urinary tract infection, or UTI, is a general term for an infection anywhere between the kidneys and the urethra (where urine comes out). Most UTIs are bladder infections. They often cause pain or burning when you urinate. UTIs are caused by bacteria and can be cured with antibiotics. Be sure to complete your treatment so that the infection goes away. Follow-up care is a key part of your treatment and safety. Be sure to make and go to all appointments, and call your doctor if you are having problems. It's also a good idea to know your test results and keep a list of the medicines you take. How can you care for yourself at home? · Take your antibiotics as directed. Do not stop taking them just because you feel better. You need to take the full course of antibiotics. · Drink extra water and other fluids for the next day or two. This may help wash out the bacteria that are causing the infection. (If you have kidney, heart, or liver disease and have to limit fluids, talk with your doctor before you increase your fluid intake.)  · Avoid drinks that are carbonated or have caffeine. They can irritate the bladder. · Urinate often. Try to empty your bladder each time. · To relieve pain, take a hot bath or lay a heating pad set on low over your lower belly or genital area. Never go to sleep with a heating pad in place. To prevent UTIs  · Drink plenty of water each day. This helps you urinate often, which clears bacteria from your system. (If you have kidney, heart, or liver disease and have to limit fluids, talk with your doctor before you increase your fluid intake.)  · Urinate when you need to. · Urinate right after you have sex. · Change sanitary pads often. · Avoid douches, bubble baths, feminine hygiene sprays, and other feminine hygiene products that have deodorants.   · After going to the bathroom, wipe from front to back.  When should you call for help? Call your doctor now or seek immediate medical care if:    · Symptoms such as fever, chills, nausea, or vomiting get worse or appear for the first time.     · You have new pain in your back just below your rib cage. This is called flank pain.     · There is new blood or pus in your urine.     · You have any problems with your antibiotic medicine.    Watch closely for changes in your health, and be sure to contact your doctor if:    · You are not getting better after taking an antibiotic for 2 days.     · Your symptoms go away but then come back. Where can you learn more? Go to http://rose-velvet.info/. Enter U904 in the search box to learn more about \"Urinary Tract Infection in Women: Care Instructions. \"  Current as of: March 20, 2018  Content Version: 11.9  © 3536-4946 [employer]. Care instructions adapted under license by [employer] (which disclaims liability or warranty for this information). If you have questions about a medical condition or this instruction, always ask your healthcare professional. [address] any warranty or liability for your use of this information.

## 2019-03-08 NOTE — PROGRESS NOTES
Identified pt with two pt identifiers(name and ). Reviewed record in preparation for visit and have obtained necessary documentation. Chief Complaint   Patient presents with    Urinary Frequency      Visit Vitals  /89 (BP 1 Location: Right arm, BP Patient Position: Sitting)   Pulse 78   Temp 98 °F (36.7 °C) (Oral)   Resp 16   Ht 4' 10\" (1.473 m)   Wt 115 lb (52.2 kg)   SpO2 97%   BMI 24.04 kg/m²         Health Maintenance Due   Topic    DTaP/Tdap/Td series (1 - Tdap)    PAP AKA CERVICAL CYTOLOGY     Shingrix Vaccine Age 50> (1 of 2)       Coordination of Care Questionnaire:  :   1) Have you been to an emergency room, urgent care, or hospitalized since your last visit? If yes, where when, and reason for visit? no       2. Have seen or consulted any other health care provider since your last visit? If yes, where when, and reason for visit? NO      3) Do you have an Advanced Directive/ Living Will in place? NO  If yes, do we have a copy on file NO  If no, would you like information NO    Patient is accompanied by self I have received verbal consent from Michael Zepeda to discuss any/all medical information while they are present in the room.

## 2019-03-08 NOTE — PROGRESS NOTES
Subjective:   Randy Bernal is a 58 y.o. female      Chief Complaint   Patient presents with    Urinary Frequency        History of present illness: She presents complaints of urinary frequency and dysuria but no fevers or chills nausea vomiting she noted this started a few days after finishing her Macrobid. She has noted no other complaints. She would like some more Pyridium which seems to help the symptoms.     Patient Active Problem List   Diagnosis Code    Bilateral carpal tunnel syndrome G56.03    Cervical radiculopathy due to degenerative joint disease of spine M47.22    Acquired hypothyroidism E03.9    Annual physical exam Z00.00    Age-related osteoporosis without current pathological fracture M81.0    Primary osteoarthritis involving multiple joints M15.0    Stage 3 chronic kidney disease ([facility] [address] N18.3    Mixed hyperlipidemia E78.2    Elevated liver enzymes R74.8    Common bile duct dilation K83.8    Gastroenteritis K52.9    Cervical spinal stenosis M48.02    Degenerative disc disease, cervical M50.30    Herniation of cervical intervertebral disc M50.20    S/P cervical spinal fusion Z98.1    Acute cystitis N30.00    Nausea & vomiting R11.2      Past Medical History:   Diagnosis Date    Adverse effect of anesthesia     Pt states her daughter had an Anaphylactic reaction to Anesthesia    Arthritis     Back pain     Hypertension     Single episode    Menopause     Neck pain     Psychiatric disorder     Depression, Anxiety    Thyroid disease       No Known Allergies   Family History   Problem Relation Age of Onset    Breast Cancer Paternal Grandmother         over 48    Stroke Mother     Heart Attack Father       Social History     Socioeconomic History    Marital status:      Spouse name: Not on file    Number of children: Not on file    Years of education: Not on file    Highest education level: Not on file   Social Needs    Financial resource strain: Not on file   [facility] Food insecurity - worry: Not on file    Food insecurity - inability: Not on file    Transportation needs - medical: Not on file   BRAINDIGIT needs - non-medical: Not on file   Occupational History    Not on file   Tobacco Use    Smoking status: Never Smoker    Smokeless tobacco: Never Used   Substance and Sexual Activity    Alcohol use: Yes     Comment: 3 Drinks per month    Drug use: No    Sexual activity: Not Currently   Other Topics Concern    Not on file   Social History Narrative    Not on file     Prior to Admission medications    Medication Sig Start Date End Date Taking? Authorizing Provider   phenazopyridine (PYRIDIUM) 200 mg tablet Take 1 Tab by mouth three (3) times daily as needed for Pain for up to 3 days. 3/8/19 3/11/19 Yes Olya Webster MD   ciprofloxacin HCl (CIPRO) 250 mg tablet Take 1 Tab by mouth two (2) times a day. 3/8/19  Yes Olya Webster MD   ondansetron (ZOFRAN ODT) 4 mg disintegrating tablet Take 1 Tab by mouth every eight (8) hours as needed for Nausea. 2/27/19  Yes Olya Webster MD   polyethylene glycol [place]) 17 gram packet Take 1 Packet by mouth daily as needed (constipation) for up to 15 days. 2/21/19 3/8/19 Yes Qi Denney NP   senna-docusate (PERICOLACE) 8.6-50 mg per tablet Take 1 Tab by mouth daily. 2/21/19  Yes Qi Denney NP   diclofenac EC (VOLTAREN) 75 mg EC tablet TAKE 1 TABLET BY MOUTH TWICE A DAY 1/31/19  Yes Olya Webster MD   atorvastatin (LIPITOR) 10 mg tablet Take 1 Tab by mouth daily. 1/14/19  Yes Olya Webster MD   levothyroxine (SYNTHROID) 125 mcg tablet TAKE 1 TABLET BY MOUTH EVERY DAY 10/8/18  Yes Sally Sun MD   multivitamin (ONE A DAY) tablet Take 1 Tab by mouth daily. Yes Provider, Historical   alendronate (FOSAMAX) 70 mg tablet Take 70 mg by mouth.  5/18/17  Yes Provider, Historical   sertraline (ZOLOFT) 50 mg tablet TAKE 1 TABLET(S) EVERY EVENING BY ORAL ROUTE.100mg 4/2/18  Yes Provider, Historical   acetaminophen (TYLENOL) 325 mg tablet Take 2 Tabs by mouth every six (6) hours for 14 days. 2/21/19 3/7/19  Chad Mcneill NP        Review of Systems              Constitutional:  She denies fever, weight loss, sweats or fatigue. EYES: No blurred or double vision,               ENT: no nasal congestion, no headache or dizziness. No difficulty with               swallowing, taste, speech or smell. Respiratory:  No cough, wheezing or shortness of breath. No sputum production. Cardiac:  Denies chest pain, palpitations, unexplained indigestion, syncope, edema, PND or orthopnea. GI:  No changes in bowel movements, no abdominal pain, no bloating, anorexia, nausea, vomiting or heartburn. : Positive for frequency and dysuria. Denies incontinence or sexual dysfunction. Extremities:  No joint pain, stiffness or swelling  Back:.no pain or soreness  Skin:  No recent rashes or mole changes. Neurological:  No numbness, tingling, burning paresthesias or loss of motor strength. No syncope, dizziness, frequent headaches or memory loss. Hematologic:  No easy bruising  Lymphatic: No lymph node enlargement    Objective:     Vitals:    03/08/19 1404   BP: 130/89   Pulse: 78   Resp: 16   Temp: 98 °F (36.7 °C)   TempSrc: Oral   SpO2: 97%   Weight: 115 lb (52.2 kg)   Height: 4' 10\" (1.473 m)   PainSc:   0 - No pain       Body mass index is 24.04 kg/m². Physical Examination:              General Appearance:  Well-developed, well-nourished, no acute distress. HEENT:      Ears:  The TMs and ear canals were clear. Eyes:  The pupillary responses were normal.  Extraocular muscle function intact. Lids and conjunctiva not injected. Funduscopic exam revealed sharp disc margins. Nares: Clear w/o edema or erythema  Pharynx:  Clear with teeth in good repair. No masses were noted. Neck:  Supple without thyromegaly or adenopathy. No JVD noted.   No carotid bruits. Lungs:  Clear to auscultation and percussion. Cardiac:  Regular rate and rhythm without murmur. PMI not displaced. No gallop, rub or click. Abdominal: Soft, non-tender, no hepata-spleenomegally or masses  Extremities:  No clubbing, cyanosis or edema. Skin:  No rash or unusual mole changes noted. Lymph Nodes:  None felt in the cervical, supraclavicular, axillary or inguinal region. Neurological: . DTRs 2+ and symmetric. Station and gait normal.   Hematologic:   No purpura or petechiae        Assessment/Plan:         1. Urinary tract infection without hematuria, site unspecified        Impressions/Plan:  Impression  Acute cystitis will treat this with Cipro 250 twice daily for 7 days and a prescription given for Pyridium  Recheck if not resolved      Orders Placed This Encounter    CULTURE, URINE    URINALYSIS W/MICROSCOPIC ([facility])    phenazopyridine (PYRIDIUM) 200 mg tablet    ciprofloxacin HCl (CIPRO) 250 mg tablet       Follow-up Disposition:  Return TBD. No results found for any visits on 03/08/19. Raina Grajeda MD    The patient was given after the visit summary the patient verbalized an understanding of the plans and problems as explained.

## 2019-03-11 LAB — BACTERIA UR CULT: ABNORMAL

## 2019-04-17 NOTE — PROGRESS NOTES
Annual Wellness Visit HPI:  
 
Patricio Da Silva is a 58y.o. year old female who is here for her annual comprehensive healthcare exam and follow-up of her medical problems to include hyperlipidemia, CKD stage III, DJD with recent neck surgery, and other medical problems. She has a new acute problem of right knee pain is been bothering her for a couple weeks without any history of injury. She did go to [facility] and they did not x-ray will put her in a knee brace and that is helped a little bit. She currently denies any chest pain, shortness of breath, palpitations, PND, orthopnea or or other cardiorespiratory complaints. She denies any GI or  complaints. She denies any headaches, dizziness or neurologic complaints. Other than in the right knee there are no current arthritic complaints and no other complaints on complete review of systems. Visit Vitals /76 (BP 1 Location: Left arm, BP Patient Position: Sitting) Pulse 75 Temp 97.4 °F (36.3 °C) (Oral) Resp 17 Ht 4' 10\" (1.473 m) Wt 115 lb (52.2 kg) SpO2 98% BMI 24.04 kg/m² Past Medical History:  
Diagnosis Date  Adverse effect of anesthesia Pt states her daughter had an Anaphylactic reaction to Anesthesia  Arthritis  Back pain  Hypertension Single episode  Menopause  Neck pain  Psychiatric disorder Depression, Anxiety  Thyroid disease Past Surgical History:  
Procedure Laterality Date  HX ORTHOPAEDIC Rotator Cuff Repair, Right  HX ORTHOPAEDIC Carpal Tunnel Repair, Right Prior to Admission medications Medication Sig Start Date End Date Taking? Authorizing Provider  
senna-docusate (PERICOLACE) 8.6-50 mg per tablet Take 1 Tab by mouth daily.  2/21/19  Yes Abby Good NP  
diclofenac EC (VOLTAREN) 75 mg EC tablet TAKE 1 TABLET BY MOUTH TWICE A DAY 1/31/19  Yes Guanakito Hernandez MD  
 atorvastatin (LIPITOR) 10 mg tablet Take 1 Tab by mouth daily. 1/14/19  Yes Sanjuana Bowens MD  
levothyroxine (SYNTHROID) 125 mcg tablet TAKE 1 TABLET BY MOUTH EVERY DAY 10/8/18  Yes Brandie Carty MD  
multivitamin (ONE A DAY) tablet Take 1 Tab by mouth daily. Yes Provider, Historical  
alendronate (FOSAMAX) 70 mg tablet Take 70 mg by mouth. 5/18/17  Yes Provider, Historical  
sertraline (ZOLOFT) 50 mg tablet TAKE 1 TABLET(S) EVERY EVENING BY ORAL ROUTE.100mg 4/2/18  Yes Provider, Historical  
  
 
No Known Allergies Family History Problem Relation Age of Onset  Breast Cancer Paternal Grandmother   
     over 48  Stroke Mother  Heart Attack Father Social History Socioeconomic History  Marital status:  Spouse name: Not on file  Number of children: Not on file  Years of education: Not on file  Highest education level: Not on file Occupational History  Not on file Social Needs  Financial resource strain: Not on file  Food insecurity:  
  Worry: Not on file Inability: Not on file  Transportation needs:  
  Medical: Not on file Non-medical: Not on file Tobacco Use  Smoking status: Never Smoker  Smokeless tobacco: Never Used Substance and Sexual Activity  Alcohol use: Yes Comment: 3 Drinks per month  Drug use: No  
 Sexual activity: Not Currently Lifestyle  Physical activity:  
  Days per week: Not on file Minutes per session: Not on file  Stress: Not on file Relationships  Social connections:  
  Talks on phone: Not on file Gets together: Not on file Attends Zoroastrianism service: Not on file Active member of club or organization: Not on file Attends meetings of clubs or organizations: Not on file Relationship status: Not on file  Intimate partner violence:  
  Fear of current or ex partner: Not on file Emotionally abused: Not on file Physically abused: Not on file Forced sexual activity: Not on file Other Topics Concern  Not on file Social History Narrative  Not on file ROS:  
 
Constitutional: She denies fevers, weight loss, sweats. Eyes: No blurred or double vision. ENT: No difficulty with swallowing, taste, speech or smell. NECK: no stiffness swelling or lymph node enlargement Respiratory: No cough wheezing or shortness of breath. Cardiovascular: Denies chest pain, palpitations, unexplained indigestion or syncope. Breast: She has noted no masses or lumps and no discharge or axillary swelling Gastrointestinal:  No changes in bowel movements, no abdominal pain, no bloating. Genitourinary: No discharge or abnormal bleeding or pain Extremities: No joint pain, stiffness or swelling except right knee pain. Neurological:  No numbness, tingling, burring paresthesias or loss of motor strength. No syncope, dizziness or frequent headache Skin:  No recent rashes or mole changes. Psychiatric/Behavioral:  Negative for depression. The patient is not nervous/anxious. HEMATOLOGIC: no easy bruising or bleeding gums Endocrine: no sweats of urinary frequency or excessive thirst 
 
 
Physical Examination:  
 
Vitals:  
 04/18/19 0857 BP: 110/76 Pulse: 75 Resp: 17 Temp: 97.4 °F (36.3 °C) TempSrc: Oral  
SpO2: 98% Weight: 115 lb (52.2 kg) Height: 4' 10\" (1.473 m) PainSc:   0 - No pain General appearance - alert, well appearing, and in no distress Mental status - alert, oriented to person, place, and time HEENT: 
Ears - bilateral TM's and external ear canals clear Eyes - pupillary responses were normal.  Extraocular muscle function intact. Lids and conjunctiva not injected. Fundoscopic exam revealed sharp disc margins. eye movements intact Pharynx- clear with teeth in good repair. No masses were noted Neck - supple without thyromegaly or burit. No JVD noted Lungs - clear to auscultation and percussion Cardiac- normal rate, regular rhythm without murmurs. PMI not displaced. No gallop, rub or click Breast: deferred to GYN Abdomen - flat, soft, non-tender without palpable organomegaly or mass. No pulsatile mass was felt, and not bruit was heard. Bowel sounds were active  Female - deferred to GYN Rectal - deferred to GYN Extremities -  no clubbing cyanosis or edema. Right knee without joint effusion normal range of motion Lymphatics - no palpable lymphadenopathy, no hepatosplenomegaly Peripheral vascular - Dorsalis pedis and posterior tibial pulses felt without difficulty Skin - no rash or unusual mole change noted Neurological - Cranial nerves II-XII grossly intact. Motor strength 5/5. DTR's 2+ and symmetric. Station and gait normal 
Back exam - full range of motion, no tenderness, palpable spasm or pain on motion Musculoskeletal - no joint tenderness, deformity or swelling Hematologic: no purpura, petechiae or bruising Assessment/Plan: 1. Annual physical exam   
2. Mixed hyperlipidemia 3. Age-related osteoporosis without current pathological fracture 4. Primary osteoarthritis involving multiple joints 5. Acquired hypothyroidism 6. Acute pain of right knee Impression 1. Annual physical examination is normal 
2. Hyperlipidemia repeat status pending and prior labs reviewed no make adjustments if necessary. Currently on Lipitor 3. Osteoporosis vitamin D level pending reviewed prior bone density 4. DJD stable except for right knee 5. Hypothyroidism status pending 6. Right knee pain x-ray obtained today shows some mild medial joint space narrowing and some mild arthritic changes but no acute process. We discussed treatment options consisting of continuing diclofenac and the knee brace that she has versus a cortisone injection versus an MRI and she elected to go with the former deciding that if it does not get better she will consider a cortisone injection. I will call with lab and make further recommendations or adjustments if necessary. Follow-up as scheduled for 3 months or sooner should to be a problem. Orders Placed This Encounter  XR KNEE RT 3 V Standing Status:   Future Standing Expiration Date:   4/18/2020 Order Specific Question:   Reason for Exam  
  Answer:   pain  CBC WITH AUTOMATED DIFF ([facility])  METABOLIC PANEL, COMPREHENSIVE ([facility])  LIPID PANEL ([facility])  CK ([facility])  T4, FREE ([facility])  TSH 3RD GENERATION ([facility])  URINALYSIS W/O MICRO ([facility])  VITAMIN D, 25 HYDROXY ([facility]) No results found for any visits on 04/18/19. I have reviewed the patient's medical history in detail and updated the computerized patient record. We had a prolonged discussion about these complex clinical issues and went over the various important aspects to consider. All questions were answered. Advised her to call back or return to office if symptoms do not improve, change in nature, or persist. 
 
She was given an after visit summary or informed of [facility] which includes patient instructions, diagnoses, current medications, & vitals. She expressed understanding with the diagnosis and plan.   
  
Kandis Callahan MD

## 2019-04-18 ENCOUNTER — OFFICE VISIT (OUTPATIENT)
Dept: INTERNAL MEDICINE CLINIC | Age: 64
End: 2019-04-18

## 2019-04-18 VITALS
HEART RATE: 75 BPM | RESPIRATION RATE: 17 BRPM | SYSTOLIC BLOOD PRESSURE: 110 MMHG | WEIGHT: 115 LBS | TEMPERATURE: 97.4 F | DIASTOLIC BLOOD PRESSURE: 76 MMHG | OXYGEN SATURATION: 98 % | HEIGHT: 58 IN | BODY MASS INDEX: 24.14 KG/M2

## 2019-04-18 DIAGNOSIS — E78.2 MIXED HYPERLIPIDEMIA: ICD-10-CM

## 2019-04-18 DIAGNOSIS — Z00.00 ANNUAL PHYSICAL EXAM: Primary | ICD-10-CM

## 2019-04-18 DIAGNOSIS — M15.9 PRIMARY OSTEOARTHRITIS INVOLVING MULTIPLE JOINTS: ICD-10-CM

## 2019-04-18 DIAGNOSIS — M81.0 AGE-RELATED OSTEOPOROSIS WITHOUT CURRENT PATHOLOGICAL FRACTURE: ICD-10-CM

## 2019-04-18 DIAGNOSIS — E03.9 ACQUIRED HYPOTHYROIDISM: ICD-10-CM

## 2019-04-18 DIAGNOSIS — M25.561 ACUTE PAIN OF RIGHT KNEE: ICD-10-CM

## 2019-04-18 LAB
25(OH)D3 SERPL-MCNC: 40 NG/ML (ref 30–96)
A-G RATIO,AGRAT: 1.6 RATIO
ALBUMIN SERPL-MCNC: 4.5 G/DL (ref 3.9–5.4)
ALP SERPL-CCNC: 69 U/L (ref 38–126)
ALT SERPL-CCNC: 29 U/L (ref 9–52)
ANION GAP SERPL CALC-SCNC: 13 MMOL/L
AST SERPL W P-5'-P-CCNC: 28 U/L (ref 14–36)
BILIRUB SERPL-MCNC: 0.3 MG/DL (ref 0.2–1.3)
BILIRUB UR QL: NEGATIVE
BUN SERPL-MCNC: 21 MG/DL (ref 7–17)
BUN/CREATININE RATIO,BUCR: 30 RATIO
CALCIUM SERPL-MCNC: 9.8 MG/DL (ref 8.4–10.2)
CHLORIDE SERPL-SCNC: 104 MMOL/L (ref 98–107)
CHOL/HDL RATIO,CHHD: 2 RATIO (ref 0–4)
CHOLEST SERPL-MCNC: 190 MG/DL (ref 0–200)
CK SERPL-CCNC: 75 U/L (ref 30–135)
CLARITY: CLEAR
CO2 SERPL-SCNC: 29 MMOL/L (ref 22–32)
COLOR UR: ABNORMAL
CREAT SERPL-MCNC: 0.7 MG/DL (ref 0.7–1.2)
ERYTHROCYTE [DISTWIDTH] IN BLOOD BY AUTOMATED COUNT: 13.4 %
GLOBULIN,GLOB: 2.9
GLUCOSE 24H UR-MRATE: NEGATIVE G/(24.H)
GLUCOSE SERPL-MCNC: 95 MG/DL (ref 65–105)
HCT VFR BLD AUTO: 37.7 % (ref 37–51)
HDLC SERPL-MCNC: 123 MG/DL (ref 35–130)
HGB BLD-MCNC: 12.9 G/DL (ref 12–18)
HGB UR QL STRIP: ABNORMAL
KETONES UR QL STRIP.AUTO: NEGATIVE
LDL/HDL RATIO,LDHD: 0 RATIO
LDLC SERPL CALC-MCNC: 52 MG/DL (ref 0–130)
LEUKOCYTE ESTERASE: NEGATIVE
LYMPHOCYTES ABSOLUTE: 3.3 K/UL (ref 0.6–4.1)
LYMPHOCYTES NFR BLD: 31.6 % (ref 10–58.5)
MCH RBC QN AUTO: 32.8 PG (ref 26–32)
MCHC RBC AUTO-ENTMCNC: 34.2 G/DL (ref 30–36)
MCV RBC AUTO: 95.9 FL (ref 80–97)
MONOCYTES ABS-DIF,2141: 1 K/UL (ref 0–1.8)
MONOCYTES NFR BLD: 9.8 % (ref 0.1–24)
NEUTROPHILS # BLD: 58.6 % (ref 37–92)
NEUTROPHILS ABS,2156: 6.1 K/UL (ref 2–7.8)
NITRITE UR QL STRIP.AUTO: NEGATIVE
PH UR STRIP: 6.5 [PH] (ref 5–7)
PLATELET # BLD AUTO: 345 K/UL (ref 140–440)
PMV BLD AUTO: 7.5 FL
POTASSIUM SERPL-SCNC: 5.5 MMOL/L (ref 3.6–5)
PROT SERPL-MCNC: 7.4 G/DL (ref 6.3–8.2)
PROT UR STRIP-MCNC: NEGATIVE MG/DL
RBC # BLD AUTO: 3.93 M/UL (ref 4.2–6.3)
RBC #/AREA URNS HPF: ABNORMAL #/HPF
SODIUM SERPL-SCNC: 146 MMOL/L (ref 137–145)
SP GR UR REFRACTOMETRY: 1.02 (ref 1–1.03)
SQUAMOUS EPITHELIAL CELLS: ABNORMAL
T4 FREE SERPL-MCNC: 1.36 NG/DL (ref 0.58–2.3)
TRIGL SERPL-MCNC: 73 MG/DL (ref 0–200)
TSH SERPL DL<=0.05 MIU/L-ACNC: 3.21 UIU/ML (ref 0.34–5.6)
UROBILINOGEN UR QL STRIP.AUTO: NEGATIVE
VLDLC SERPL CALC-MCNC: 15 MG/DL
WBC # BLD AUTO: 10.4 K/UL (ref 4.1–10.9)
WBC URNS QL MICRO: 0 #/HPF

## 2019-04-18 NOTE — PROGRESS NOTES
Chief Complaint Patient presents with Velta Ganser Annual Wellness Visit Visit Vitals /76 (BP 1 Location: Left arm, BP Patient Position: Sitting) Pulse 75 Temp 97.4 °F (36.3 °C) (Oral) Resp 17 Ht 4' 10\" (1.473 m) Wt 115 lb (52.2 kg) SpO2 98% BMI 24.04 kg/m² 1. Have you been to the ER, urgent care clinic since your last visit? Hospitalized since your last visit? 4/13/19 [facility] for right knee pain 2. Have you seen or consulted any other health care providers outside of the [address] [place] [address] since your last visit? Include any pap smears or colon screening. Seeing Sarita Rao psychiatrist 4/19/19

## 2019-04-19 NOTE — PROGRESS NOTES
Very high cholesterol and LDL. Question taking Lipitor 10 daily.   If he has increased to 20 daily and watch diet

## 2019-04-22 DIAGNOSIS — M25.561 ACUTE PAIN OF RIGHT KNEE: Primary | ICD-10-CM

## 2019-04-22 NOTE — PROGRESS NOTES
Dr. Marcia Eng reviewed the numbers again and states she does not need to increase her lipitor as discussed in the order.

## 2019-04-24 DIAGNOSIS — M25.561 ACUTE PAIN OF RIGHT KNEE: Primary | ICD-10-CM

## 2019-07-02 DIAGNOSIS — E03.9 ACQUIRED HYPOTHYROIDISM: ICD-10-CM

## 2019-07-02 RX ORDER — LEVOTHYROXINE SODIUM 125 UG/1
TABLET ORAL
Qty: 90 TAB | Refills: 3 | Status: SHIPPED | OUTPATIENT
Start: 2019-07-02 | End: 2019-10-31 | Stop reason: ALTCHOICE

## 2019-07-30 NOTE — PROGRESS NOTES
Chief Complaint   Patient presents with    Hypothyroidism     3 month follow up        SUBJECTIVE:    Gabbi Hickey is a 58 y.o. female who returns in follow-up for medical problems include hyperlipidemia, CKD stage III, DJD, prior elevated liver enzymes, osteoporosis, and hypothyroidism. She also was treated by the psychiatrist for depression but is currently off of her Zoloft and does need to get another appointment. She denies any chest pain, shortness of breath, palpitations, PND, orthopnea or other cardiorespiratory complaints. She notes no GI or  complaints. She denies any headaches, dizziness or neurologic complaints. She has no current arthritic complaints and there are no other complaints on complete review of systems. She does note she seems to be more fatigued than usual and is wondering if there is anything medicine that could be causing that. She currently is not taking her Zoloft and does plan to go back on that when she sees her psychiatrist and gets her counseling rearranged. Current Outpatient Medications   Medication Sig Dispense Refill    levothyroxine (SYNTHROID) 125 mcg tablet TAKE 1 TABLET BY MOUTH EVERY DAY 90 Tab 3    senna-docusate (PERICOLACE) 8.6-50 mg per tablet Take 1 Tab by mouth daily. 30 Tab 0    diclofenac EC (VOLTAREN) 75 mg EC tablet TAKE 1 TABLET BY MOUTH TWICE A  Tab 3    atorvastatin (LIPITOR) 10 mg tablet Take 1 Tab by mouth daily. 90 Tab 3    multivitamin (ONE A DAY) tablet Take 1 Tab by mouth daily.  alendronate (FOSAMAX) 70 mg tablet Take 70 mg by mouth.       sertraline (ZOLOFT) 50 mg tablet TAKE 1 TABLET(S) EVERY EVENING BY ORAL ROUTE.100mg  1     Past Medical History:   Diagnosis Date    Adverse effect of anesthesia     Pt states her daughter had an Anaphylactic reaction to Anesthesia    Arthritis     Back pain     Hypertension     Single episode    Menopause     Neck pain     Psychiatric disorder     Depression, Anxiety    Thyroid disease      Past Surgical History:   Procedure Laterality Date    HX ORTHOPAEDIC      Rotator Cuff Repair, Right    HX ORTHOPAEDIC      Carpal Tunnel Repair, Right     No Known Allergies    REVIEW OF SYSTEMS:  General: negative for - chills or fever, or weight loss or gain.   Positive for some fatigue  ENT: negative for - headaches, nasal congestion or tinnitus  Eyes: no blurred or visual changes  Neck: No stiffness or swollen nodes  Respiratory: negative for - cough, hemoptysis, shortness of breath or wheezing  Cardiovascular : negative for - chest pain, edema, palpitations or shortness of breath  Gastrointestinal: negative for - abdominal pain, blood in stools, heartburn or nausea/vomiting  Genito-Urinary: no dysuria, trouble voiding, or hematuria  Musculoskeletal: negative for - gait disturbance, joint pain, joint stiffness or joint swelling  Neurological: no TIA or stroke symptoms  Hematologic: no bruises, no bleeding  Lymphatic: no swollen glands  Integument: no lumps, mole changes, nail changes or rash  Endocrine:no malaise/lethargy poly uria or polydipsia or unexpected weight changes        Social History     Socioeconomic History    Marital status:      Spouse name: Not on file    Number of children: Not on file    Years of education: Not on file    Highest education level: Not on file   Tobacco Use    Smoking status: Never Smoker    Smokeless tobacco: Never Used   Substance and Sexual Activity    Alcohol use: Yes     Comment: 3 Drinks per month    Drug use: No    Sexual activity: Not Currently     Family History   Problem Relation Age of Onset    Breast Cancer Paternal Grandmother         over 48    Stroke Mother     Heart Attack Father        OBJECTIVE:     Visit Vitals  /74 (BP 1 Location: Left arm, BP Patient Position: Sitting)   Pulse 74   Temp 98.1 °F (36.7 °C) (Oral)   Resp 18   Ht 4' 10\" (1.473 m)   Wt 111 lb 3.2 oz (50.4 kg)   SpO2 98%   BMI 23.24 kg/m² CONSTITUTIONAL:   well nourished, appears age appropriate  EYES: sclera anicteric, PERRL, EOMI  ENMT:nares clear, moist mucous membranes, pharynx clear  NECK: supple. Thyroid normal, No JVD or bruits  RESPIRATORY: Chest: clear to ascultation and percussion, normal inspiratory effort  CARDIOVASCULAR: Heart: regular rate and rhythm no murmurs, rubs or gallops, PMI not displaced, No thrills  GASTROINTESTINAL: Abdomen: non distended, soft, non tender, bowel sounds normal  HEMATOLOGIC: no purpura, petechiae or bruising  LYMPHATIC: No lymph node enlargemant  MUSCULOSKELETAL: Extremities: no edema or active synovitis, pulse 1+   INTEGUMENT: No unusual rashes or suspicious skin lesions noted. Nails appear normal.  PERIPHERAL VASCULAR: normal pulses femoral, PT and DP  NEUROLOGIC: non-focal exam, A & O X 3  PSYCHIATRIC:, appropriate affect     ASSESSMENT:   1. Mixed hyperlipidemia    2. Primary osteoarthritis involving multiple joints    3. Elevated liver enzymes    4. Age-related osteoporosis without current pathological fracture    5. Stage 3 chronic kidney disease ([facility] [address]    6. Acquired hypothyroidism      Impression  1. Hyperlipidemia prior lab reviewed and repeat status pending and I will adjust if necessary. 2.  DJD that is currently stable  3. Prior elevated liver enzymes repeat status is pending  4. Osteoporosis reviewed prior bone density  5. CKD stage III repeat status pending  6. Hypothyroidism thyroid studies were normal in April however in light of her fatigue I will repeat those today  I will call with lab results and make further recommendations or adjustments if necessary. Follow-up scheduled again for 3 months or sooner if there is a problem.     PLAN:  .  Orders Placed This Encounter    METABOLIC PANEL, COMPREHENSIVE ([facility])    LIPID PANEL ([facility])    CK ([facility])    T4, FREE ([facility])    TSH 3RD GENERATION ([facility])         ATTENTION:   This medical record was transcribed using an electronic medical records system. Although proofread, it may and can contain electronic and spelling errors. Other human spelling and other errors may be present. Corrections may be executed at a later time. Please feel free to contact us for any clarifications as needed. Follow-up and Dispositions    · Return in about 3 months (around 10/31/2019). No results found for any visits on 07/31/19. Tevin Monroe MD    The patient verbalized understanding of the problems and plans as explained.

## 2019-07-31 ENCOUNTER — OFFICE VISIT (OUTPATIENT)
Dept: INTERNAL MEDICINE CLINIC | Age: 64
End: 2019-07-31

## 2019-07-31 VITALS
HEIGHT: 58 IN | TEMPERATURE: 98.1 F | OXYGEN SATURATION: 98 % | BODY MASS INDEX: 23.34 KG/M2 | SYSTOLIC BLOOD PRESSURE: 110 MMHG | HEART RATE: 74 BPM | WEIGHT: 111.2 LBS | DIASTOLIC BLOOD PRESSURE: 74 MMHG | RESPIRATION RATE: 18 BRPM

## 2019-07-31 DIAGNOSIS — M81.0 AGE-RELATED OSTEOPOROSIS WITHOUT CURRENT PATHOLOGICAL FRACTURE: ICD-10-CM

## 2019-07-31 DIAGNOSIS — M15.9 PRIMARY OSTEOARTHRITIS INVOLVING MULTIPLE JOINTS: ICD-10-CM

## 2019-07-31 DIAGNOSIS — E78.2 MIXED HYPERLIPIDEMIA: Primary | ICD-10-CM

## 2019-07-31 DIAGNOSIS — E03.9 ACQUIRED HYPOTHYROIDISM: ICD-10-CM

## 2019-07-31 DIAGNOSIS — R74.8 ELEVATED LIVER ENZYMES: ICD-10-CM

## 2019-07-31 DIAGNOSIS — N18.30 STAGE 3 CHRONIC KIDNEY DISEASE (HCC): ICD-10-CM

## 2019-07-31 LAB
A-G RATIO,AGRAT: 1.7 RATIO
ALBUMIN SERPL-MCNC: 4.6 G/DL (ref 3.9–5.4)
ALP SERPL-CCNC: 73 U/L (ref 38–126)
ALT SERPL-CCNC: 61 U/L (ref 9–52)
ANION GAP SERPL CALC-SCNC: 14 MMOL/L
AST SERPL W P-5'-P-CCNC: 44 U/L (ref 14–36)
BILIRUB SERPL-MCNC: 0.4 MG/DL (ref 0.2–1.3)
BUN SERPL-MCNC: 15 MG/DL (ref 7–17)
BUN/CREATININE RATIO,BUCR: 19 RATIO
CALCIUM SERPL-MCNC: 9.5 MG/DL (ref 8.4–10.2)
CHLORIDE SERPL-SCNC: 102 MMOL/L (ref 98–107)
CHOL/HDL RATIO,CHHD: 2 RATIO (ref 0–4)
CHOLEST SERPL-MCNC: 226 MG/DL (ref 0–200)
CK SERPL-CCNC: 226 U/L (ref 30–135)
CO2 SERPL-SCNC: 27 MMOL/L (ref 22–32)
CREAT SERPL-MCNC: 0.8 MG/DL (ref 0.7–1.2)
GLOBULIN,GLOB: 2.7
GLUCOSE SERPL-MCNC: 93 MG/DL (ref 65–105)
HDLC SERPL-MCNC: 107 MG/DL (ref 35–130)
LDL/HDL RATIO,LDHD: 1 RATIO
LDLC SERPL CALC-MCNC: 107 MG/DL (ref 0–130)
POTASSIUM SERPL-SCNC: 4.9 MMOL/L (ref 3.6–5)
PROT SERPL-MCNC: 7.3 G/DL (ref 6.3–8.2)
SODIUM SERPL-SCNC: 143 MMOL/L (ref 137–145)
TRIGL SERPL-MCNC: 58 MG/DL (ref 0–200)
VLDLC SERPL CALC-MCNC: 12 MG/DL

## 2019-07-31 NOTE — PATIENT INSTRUCTIONS
Arthritis: Care Instructions Your Care Instructions Arthritis, also called osteoarthritis, is a breakdown of the cartilage that cushions your joints. When the cartilage wears down, your bones rub against each other. This causes pain and stiffness. Many people have some arthritis as they age. Arthritis most often affects the joints of the spine, hands, hips, knees, or feet. You can take simple measures to protect your joints, ease your pain, and help you stay active. Follow-up care is a key part of your treatment and safety. Be sure to make and go to all appointments, and call your doctor if you are having problems. It's also a good idea to know your test results and keep a list of the medicines you take. How can you care for yourself at home? · Stay at a healthy weight. Being overweight puts extra strain on your joints. · Talk to your doctor or physical therapist about exercises that will help ease joint pain. ? Stretch. You may enjoy gentle forms of yoga to help keep your joints and muscles flexible. ? Walk instead of jog. Other types of exercise that are less stressful on the joints include riding a bicycle, swimming, ambrocio chi, or water exercise. ? Lift weights. Strong muscles help reduce stress on your joints. Stronger thigh muscles, for example, take some of the stress off of the knees and hips. Learn the right way to lift weights so you do not make joint pain worse. · Take your medicines exactly as prescribed. Call your doctor if you think you are having a problem with your medicine. · Take pain medicines exactly as directed. ? If the doctor gave you a prescription medicine for pain, take it as prescribed. ? If you are not taking a prescription pain medicine, ask your doctor if you can take an over-the-counter medicine. · Use a cane, crutch, walker, or another device if you need help to get around. These can help rest your joints.  You also can use other things to make life easier, such as a higher toilet seat and padded handles on kitchen utensils. · Do not sit in low chairs, which can make it hard to get up. · Put heat or cold on your sore joints as needed. Use whichever helps you most. You also can take turns with hot and cold packs. ? Apply heat 2 or 3 times a day for 20 to 30 minutesusing a heating pad, hot shower, or hot packto relieve pain and stiffness. ? Put ice or a cold pack on your sore joint for 10 to 20 minutes at a time. Put a thin cloth between the ice and your skin. When should you call for help? Call your doctor now or seek immediate medical care if: 
  · You have sudden swelling, warmth, or pain in any joint.  
  · You have joint pain and a fever or rash.  
  · You have such bad pain that you cannot use a joint.  
 Watch closely for changes in your health, and be sure to contact your doctor if: 
  · You have mild joint symptoms that continue even with more than 6 weeks of care at home.  
  · You have stomach pain or other problems with your medicine. Where can you learn more? Go to http://rose-velvet.info/. Enter P904 in the search box to learn more about \"Arthritis: Care Instructions. \" Current as of: April 1, 2019 Content Version: 12.1 © 0759-2432 [employer]. Care instructions adapted under license by [employer] (which disclaims liability or warranty for this information). If you have questions about a medical condition or this instruction, always ask your healthcare professional. [place] [address] any warranty or liability for your use of this information.

## 2019-07-31 NOTE — PROGRESS NOTES
Ilene Days  Identified pt with two pt identifiers(name and ). Chief Complaint   Patient presents with    Hypothyroidism     3 month follow up        1. Have you been to the ER, urgent care clinic since your last visit? Hospitalized since your last visit? No    2. Have you seen or consulted any other health care providers outside of the [address] [place] [address] since your last visit? Include any pap smears or colon screening. Yes, [facility] to see a hand specialist sense she broke her right thumb on 19. Health Maintenance Topics with due status: Overdue       Topic Date Due    Pneumococcal 0-64 years 10/07/1960    DTaP/Tdap/Td series 10/07/1975    PAP AKA CERVICAL CYTOLOGY 10/07/1975    Shingrix Vaccine Age 50> 10/07/2004     Health Maintenance Topics with due status: Not Due       Topic Last Completion Date    COLONOSCOPY 2011    BREAST CANCER SCRN MAMMOGRAM 2018    Influenza Age 9 to Adult 2018     Health Maintenance Topics with due status: Completed       Topic Last Completion Date    Bone Densitometry (Dexa) Screening 2017    Hepatitis C Screening 2018           Medication reconciliation up to date and corrected with patient at this time. Today's provider has been notified of reason for visit, vitals and flowsheets obtained on patients. Reviewed record in preparation for visit, huddled with provider and have obtained necessary documentation.         Wt Readings from Last 3 Encounters:   19 111 lb 3.2 oz (50.4 kg)   19 115 lb (52.2 kg)   19 115 lb (52.2 kg)     Temp Readings from Last 3 Encounters:   19 98.1 °F (36.7 °C) (Oral)   19 97.4 °F (36.3 °C) (Oral)   19 98 °F (36.7 °C) (Oral)     BP Readings from Last 3 Encounters:   19 110/74   19 110/76   19 130/89     Pulse Readings from Last 3 Encounters:   19 74   19 75   19 78     Vitals:    19 0919   BP: 110/74   Pulse: 74 Resp: 18   Temp: 98.1 °F (36.7 °C)   TempSrc: Oral   SpO2: 98%   Weight: 111 lb 3.2 oz (50.4 kg)   Height: 4' 10\" (1.473 m)   PainSc:   0 - No pain         Learning Assessment:  :     Learning Assessment 6/20/2018   PRIMARY LEARNER Patient   PRIMARY LANGUAGE ENGLISH   LEARNER PREFERENCE PRIMARY DEMONSTRATION   ANSWERED BY self   RELATIONSHIP SELF       Depression Screening:  :     3 most recent PHQ Screens 4/18/2019   Little interest or pleasure in doing things Not at all   Feeling down, depressed, irritable, or hopeless More than half the days   Total Score PHQ 2 2   Feeling tired or having little energy -   Poor appetite, weight loss, or overeating -   Feeling bad about yourself - or that you are a failure or have let yourself or your family down -   Trouble concentrating on things such as school, work, reading, or watching TV -   Moving or speaking so slowly that other people could have noticed; or the opposite being so fidgety that others notice -   Thoughts of being better off dead, or hurting yourself in some way -       No flowsheet data found. Fall Risk Assessment:  :     Fall Risk Assessment, last 12 mths 1/10/2019   Able to walk? Yes   Fall in past 12 months? Yes   Fall with injury? No   Number of falls in past 12 months 1   Fall Risk Score 1       Abuse Screening:  :     Abuse Screening Questionnaire 1/10/2019 7/3/2018   Do you ever feel afraid of your partner? N N   Are you in a relationship with someone who physically or mentally threatens you? N N   Is it safe for you to go home? Y Y       ADL Screening:  :     No flowsheet data found.

## 2019-08-01 LAB
T4 FREE SERPL-MCNC: 1.16 NG/DL (ref 0.58–2.3)
TSH SERPL DL<=0.05 MIU/L-ACNC: 11.19 UIU/ML (ref 0.34–5.6)

## 2019-08-06 RX ORDER — LEVOTHYROXINE SODIUM 137 UG/1
137 TABLET ORAL
Qty: 90 TAB | Refills: 0 | Status: SHIPPED | OUTPATIENT
Start: 2019-08-06 | End: 2019-10-30 | Stop reason: SDUPTHER

## 2019-08-06 NOTE — PROGRESS NOTES
Called and spoke to patient  Two pt identifiers confirmed  Informed patient per Dr. Mary Tompkins that all labs were ok except slightly elevated TSH. Advised patient per Dr. Mary Tompkins that he recommends increasing Synthroid to 0.1375 daily. Rx will be sent to [facility] [address] and [address]. Patient verbalized understanding of information discussed  with no further questions at this time.

## 2019-09-24 PROBLEM — Z00.00 ANNUAL PHYSICAL EXAM: Status: RESOLVED | Noted: 2018-04-18 | Resolved: 2019-09-24

## 2019-10-30 RX ORDER — LEVOTHYROXINE SODIUM 137 UG/1
137 TABLET ORAL
Qty: 90 TAB | Refills: 3 | Status: SHIPPED | OUTPATIENT
Start: 2019-10-30 | End: 2020-10-19

## 2019-10-30 NOTE — TELEPHONE ENCOUNTER
RX refill request from the patient/pharmacy. Patient last seen 07- with labs, and next appt. scheduled for 10-  Requested Prescriptions     Pending Prescriptions Disp Refills    levothyroxine (SYNTHROID) 137 mcg tablet [Pharmacy Med Name: LEVOTHYROXINE 137 MCG TABLET] 90 Tab 3     Sig: [id number] (BEFORE BREAKFAST).    Aaron Connolly

## 2019-10-30 NOTE — PROGRESS NOTES
Chief Complaint   Patient presents with    Chronic Kidney Disease     3 month follow up    Hyperthyroidism    Cholesterol Problem       SUBJECTIVE:    Nicole Valerio is a 71 y.o. female who returns in follow-up for her hyperlipidemia, DJD, CKD stage III, hypothyroidism, and other medical issues. She recently had a fall at home when she fell on her day bed in developed some pain in her right lower rib cage. She went to [facility] and they did x-ray and total there were no fractures. She still noting some discomfort about 2 weeks later as far as taking a deep breath but notes no shortness of breath. She denies any other chest pain other not related to breathing in the right lower anterior rib cage. She denies any palpitations, PND, orthopnea or other cardiorespiratory complaints. There are no GI or  complaints. She notes no headaches, dizziness or neurologic complaints. She has no current arthritic complaints and there are no other complaints on complete review of systems. Current Outpatient Medications   Medication Sig Dispense Refill    levothyroxine (SYNTHROID) 137 mcg tablet TAKE 137 MCG BY MOUTH DAILY (BEFORE BREAKFAST). 90 Tab 3    diclofenac EC (VOLTAREN) 75 mg EC tablet TAKE 1 TABLET BY MOUTH TWICE A  Tab 3    atorvastatin (LIPITOR) 10 mg tablet Take 1 Tab by mouth daily. 90 Tab 3    multivitamin (ONE A DAY) tablet Take 1 Tab by mouth daily.  alendronate (FOSAMAX) 70 mg tablet Take 70 mg by mouth.       sertraline (ZOLOFT) 50 mg tablet TAKE 1 TABLET(S) EVERY EVENING BY ORAL ROUTE.100mg  1     Past Medical History:   Diagnosis Date    Adverse effect of anesthesia     Pt states her daughter had an Anaphylactic reaction to Anesthesia    Arthritis     Back pain     Hypertension     Single episode    Menopause     Neck pain     Psychiatric disorder     Depression, Anxiety    Thyroid disease      Past Surgical History:   Procedure Laterality Date    HX ORTHOPAEDIC Rotator Cuff Repair, Right    HX ORTHOPAEDIC      Carpal Tunnel Repair, Right     No Known Allergies    REVIEW OF SYSTEMS:  General: negative for - chills or fever, or weight loss or gain  ENT: negative for - headaches, nasal congestion or tinnitus  Eyes: no blurred or visual changes  Neck: No stiffness or swollen nodes  Respiratory: negative for - cough, hemoptysis, shortness of breath or wheezing  Cardiovascular : negative for - chest pain, edema, palpitations or shortness of breath  Gastrointestinal: negative for - abdominal pain, blood in stools, heartburn or nausea/vomiting  Genito-Urinary: no dysuria, trouble voiding, or hematuria  Musculoskeletal: negative for - gait disturbance, joint pain, joint stiffness or joint swelling  Neurological: no TIA or stroke symptoms  Hematologic: no bruises, no bleeding  Lymphatic: no swollen glands  Integument: no lumps, mole changes, nail changes or rash  Endocrine:no malaise/lethargy poly uria or polydipsia or unexpected weight changes        Social History     Socioeconomic History    Marital status:      Spouse name: Not on file    Number of children: Not on file    Years of education: Not on file    Highest education level: Not on file   Tobacco Use    Smoking status: Never Smoker    Smokeless tobacco: Never Used   Substance and Sexual Activity    Alcohol use: Yes     Comment: 3 Drinks per month    Drug use: No    Sexual activity: Not Currently     Family History   Problem Relation Age of Onset    Breast Cancer Paternal Grandmother         over 48    Stroke Mother     Heart Attack Father        OBJECTIVE:     Visit Vitals  /80 (BP 1 Location: Left arm, BP Patient Position: Sitting)   Pulse 81   Temp 98.4 °F (36.9 °C) (Oral)   Resp 18   Ht 4' 10\" (1.473 m)   Wt 114 lb 6.4 oz (51.9 kg)   SpO2 98%   BMI 23.91 kg/m²     CONSTITUTIONAL:   well nourished, appears age appropriate  EYES: sclera anicteric, PERRL, EOMI  ENMT:nares clear, moist mucous membranes, pharynx clear  NECK: supple. Thyroid normal, No JVD or bruits  RESPIRATORY: Chest: clear to ascultation and percussion, normal inspiratory effort  CARDIOVASCULAR: Heart: regular rate and rhythm no murmurs, rubs or gallops, PMI not displaced, No thrills  GASTROINTESTINAL: Abdomen: non distended, soft, non tender, bowel sounds normal  HEMATOLOGIC: no purpura, petechiae or bruising  LYMPHATIC: No lymph node enlargemant  MUSCULOSKELETAL: Extremities: no edema or active synovitis, pulse 1+   INTEGUMENT: No unusual rashes or suspicious skin lesions noted. Nails appear normal.  PERIPHERAL VASCULAR: normal pulses femoral, PT and DP  NEUROLOGIC: non-focal exam, A & O X 3  PSYCHIATRIC:, appropriate affect     ASSESSMENT:   1. Mixed hyperlipidemia    2. Primary osteoarthritis involving multiple joints    3. Stage 3 chronic kidney disease (HCC)    4. Age-related osteoporosis without current pathological fracture    5. Acquired hypothyroidism    6. Encounter for immunization      Impression  1. Hyperlipidemia we will see what that status is and make adjustments if necessary. 2.  DJD that is stable   3   CKD stage III repeat status pending  4. Osteoporosis stable on last check   5   Hypothyroidism stable on last check  6. Right lower rib cage pain and mild tenderness she could have fractured a rib and not displaced to but at this point you would do nothing different as she is already on diclofenac except I told her she can add some Tylenol arthritis. Flu shot given today. Recheck in 3 months or sooner should there be further problems. I will call with lab results. PLAN:  .  Orders Placed This Encounter    Influenza Vaccine Inactivated (IIV) (FLUAD), Subunit, Adjuvanted, IM ([id number])    METABOLIC PANEL, COMPREHENSIVE ([facility])    LIPID PANEL ([facility])    CK ([facility])         ATTENTION:   This medical record was transcribed using an electronic medical records system.   Although proofread, it may and can contain electronic and spelling errors. Other human spelling and other errors may be present. Corrections may be executed at a later time. Please feel free to contact us for any clarifications as needed. Follow-up and Dispositions    · Return in about 3 months (around 1/31/2020). No results found for any visits on 10/31/19. Connie Reyes MD    The patient verbalized understanding of the problems and plans as explained.

## 2019-10-31 ENCOUNTER — OFFICE VISIT (OUTPATIENT)
Dept: INTERNAL MEDICINE CLINIC | Age: 64
End: 2019-10-31

## 2019-10-31 VITALS
HEIGHT: 58 IN | BODY MASS INDEX: 24.01 KG/M2 | RESPIRATION RATE: 18 BRPM | DIASTOLIC BLOOD PRESSURE: 80 MMHG | TEMPERATURE: 98.4 F | SYSTOLIC BLOOD PRESSURE: 118 MMHG | OXYGEN SATURATION: 98 % | WEIGHT: 114.4 LBS | HEART RATE: 81 BPM

## 2019-10-31 DIAGNOSIS — M15.9 PRIMARY OSTEOARTHRITIS INVOLVING MULTIPLE JOINTS: ICD-10-CM

## 2019-10-31 DIAGNOSIS — E78.2 MIXED HYPERLIPIDEMIA: Primary | ICD-10-CM

## 2019-10-31 DIAGNOSIS — N18.30 STAGE 3 CHRONIC KIDNEY DISEASE (HCC): ICD-10-CM

## 2019-10-31 DIAGNOSIS — E03.9 ACQUIRED HYPOTHYROIDISM: ICD-10-CM

## 2019-10-31 DIAGNOSIS — Z23 ENCOUNTER FOR IMMUNIZATION: ICD-10-CM

## 2019-10-31 DIAGNOSIS — M81.0 AGE-RELATED OSTEOPOROSIS WITHOUT CURRENT PATHOLOGICAL FRACTURE: ICD-10-CM

## 2019-10-31 LAB
A-G RATIO,AGRAT: 1.4 RATIO
ALBUMIN SERPL-MCNC: 4.4 G/DL (ref 3.9–5.4)
ALP SERPL-CCNC: 70 U/L (ref 38–126)
ALT SERPL-CCNC: 51 U/L (ref 0–35)
ANION GAP SERPL CALC-SCNC: 10 MMOL/L
AST SERPL W P-5'-P-CCNC: 46 U/L (ref 14–36)
BILIRUB SERPL-MCNC: 0.6 MG/DL (ref 0.2–1.3)
BUN SERPL-MCNC: 18 MG/DL (ref 7–17)
BUN/CREATININE RATIO,BUCR: 18 RATIO
CALCIUM SERPL-MCNC: 9.8 MG/DL (ref 8.4–10.2)
CHLORIDE SERPL-SCNC: 105 MMOL/L (ref 98–107)
CHOL/HDL RATIO,CHHD: 3 RATIO (ref 0–4)
CHOLEST SERPL-MCNC: 280 MG/DL (ref 0–200)
CK SERPL-CCNC: 192 U/L (ref 30–135)
CO2 SERPL-SCNC: 27 MMOL/L (ref 22–32)
CREAT SERPL-MCNC: 1 MG/DL (ref 0.7–1.2)
GLOBULIN,GLOB: 3.2
GLUCOSE SERPL-MCNC: 98 MG/DL (ref 65–105)
HDLC SERPL-MCNC: 101 MG/DL (ref 35–130)
LDL/HDL RATIO,LDHD: 2 RATIO
LDLC SERPL CALC-MCNC: 164 MG/DL (ref 0–130)
POTASSIUM SERPL-SCNC: 4.9 MMOL/L (ref 3.6–5)
PROT SERPL-MCNC: 7.6 G/DL (ref 6.3–8.2)
SODIUM SERPL-SCNC: 142 MMOL/L (ref 137–145)
TRIGL SERPL-MCNC: 77 MG/DL (ref 0–200)
VLDLC SERPL CALC-MCNC: 15 MG/DL

## 2019-10-31 NOTE — PROGRESS NOTES
Chief Complaint   Patient presents with    Chronic Kidney Disease     3 month follow up    Hyperthyroidism    Cholesterol Problem     Visit Vitals  /80 (BP 1 Location: Left arm, BP Patient Position: Sitting)   Pulse 81   Temp 98.4 °F (36.9 °C) (Oral)   Resp 18   Ht 4' 10\" (1.473 m)   Wt 114 lb 6.4 oz (51.9 kg)   SpO2 98%   BMI 23.91 kg/m²     1. Have you been to the ER, urgent care clinic since your last visit? Hospitalized since your last visit?10/26/19 [facility] following a fall the previous week    2. Have you seen or consulted any other health care providers outside of the [address] [place] [address] since your last visit? Include any pap smears or colon screening.  No

## 2019-10-31 NOTE — PROGRESS NOTES
After obtaining written consent and per orders of Dr. Edith Cullen, injection of flu vaccine given by Octavio Joshi RN. Order and injection/medication verified by second nurse/ma review by [facility]. Patient tolerated procedure well. VIS was given to them. No reactions noted.

## 2019-10-31 NOTE — PATIENT INSTRUCTIONS
Arthritis: Care Instructions Your Care Instructions Arthritis, also called osteoarthritis, is a breakdown of the cartilage that cushions your joints. When the cartilage wears down, your bones rub against each other. This causes pain and stiffness. Many people have some arthritis as they age. Arthritis most often affects the joints of the spine, hands, hips, knees, or feet. You can take simple measures to protect your joints, ease your pain, and help you stay active. Follow-up care is a key part of your treatment and safety. Be sure to make and go to all appointments, and call your doctor if you are having problems. It's also a good idea to know your test results and keep a list of the medicines you take. How can you care for yourself at home? · Stay at a healthy weight. Being overweight puts extra strain on your joints. · Talk to your doctor or physical therapist about exercises that will help ease joint pain. ? Stretch. You may enjoy gentle forms of yoga to help keep your joints and muscles flexible. ? Walk instead of jog. Other types of exercise that are less stressful on the joints include riding a bicycle, swimming, ambrocio chi, or water exercise. ? Lift weights. Strong muscles help reduce stress on your joints. Stronger thigh muscles, for example, take some of the stress off of the knees and hips. Learn the right way to lift weights so you do not make joint pain worse. · Take your medicines exactly as prescribed. Call your doctor if you think you are having a problem with your medicine. · Take pain medicines exactly as directed. ? If the doctor gave you a prescription medicine for pain, take it as prescribed. ? If you are not taking a prescription pain medicine, ask your doctor if you can take an over-the-counter medicine. · Use a cane, crutch, walker, or another device if you need help to get around. These can help rest your joints.  You also can use other things to make life easier, such as a higher toilet seat and padded handles on kitchen utensils. · Do not sit in low chairs, which can make it hard to get up. · Put heat or cold on your sore joints as needed. Use whichever helps you most. You also can take turns with hot and cold packs. ? Apply heat 2 or 3 times a day for 20 to 30 minutesusing a heating pad, hot shower, or hot packto relieve pain and stiffness. ? Put ice or a cold pack on your sore joint for 10 to 20 minutes at a time. Put a thin cloth between the ice and your skin. When should you call for help? Call your doctor now or seek immediate medical care if: 
  · You have sudden swelling, warmth, or pain in any joint.  
  · You have joint pain and a fever or rash.  
  · You have such bad pain that you cannot use a joint.  
 Watch closely for changes in your health, and be sure to contact your doctor if: 
  · You have mild joint symptoms that continue even with more than 6 weeks of care at home.  
  · You have stomach pain or other problems with your medicine. Where can you learn more? Go to http://rose-velvet.info/. Enter O902 in the search box to learn more about \"Arthritis: Care Instructions. \" Current as of: April 1, 2019 Content Version: 12.2 © 8615-3416 [employer]. Care instructions adapted under license by [employer] (which disclaims liability or warranty for this information). If you have questions about a medical condition or this instruction, always ask your healthcare professional. [place] [address] any warranty or liability for your use of this information.

## 2019-11-05 ENCOUNTER — HOSPITAL ENCOUNTER (OUTPATIENT)
Dept: MAMMOGRAPHY | Age: 64
Discharge: HOME | End: 2019-11-05
Attending: INTERNAL MEDICINE
Payer: COMMERCIAL

## 2019-11-05 DIAGNOSIS — Z12.31 VISIT FOR SCREENING MAMMOGRAM: ICD-10-CM

## 2019-11-05 PROCEDURE — 77067 SCR MAMMO BI INCL CAD: CPT

## 2019-11-06 NOTE — PROGRESS NOTES
Markedly elevated cholesterol and LDL. I have listed that she is taking Lipitor 10 daily. If that is true I would increase it to 20 daily.   Liver enzymes are slightly elevated but stable

## 2019-11-12 RX ORDER — ATORVASTATIN CALCIUM 20 MG/1
10 TABLET, FILM COATED ORAL DAILY
Qty: 90 TAB | Refills: 1 | Status: SHIPPED | OUTPATIENT
Start: 2019-11-12 | End: 2021-04-21 | Stop reason: ALTCHOICE

## 2019-11-12 NOTE — TELEPHONE ENCOUNTER
Jf Alvarenga had called Ms Arian Dennis to give her  Her lab results and Ms Meggan missed it   Please call her back

## 2019-11-12 NOTE — PROGRESS NOTES
Verified two patient identifiers, name and date of birth. Patient provided with lab results.  No questions at this time  Sent refill to dr Denzel Trujillo for approval

## 2019-11-12 NOTE — TELEPHONE ENCOUNTER
Verified two patient identifiers, name and date of birth. Notes recorded by Cece Zepeda MD on 11/6/2019 at 6:46 PM EST  Markedly elevated cholesterol and LDL.  I have listed that she is taking Lipitor 10 daily.  If that is true I would increase it to 20 daily.  Liver enzymes are slightly elevated but stable. Patient verifies that she is taking lipitor 10mg and agrees to the increase.

## 2020-01-30 NOTE — PROGRESS NOTES
Chief Complaint   Patient presents with    Cholesterol Problem     3 month follow up    Thyroid Problem     3 month follow up       SUBJECTIVE:    Sunny Chacon is a 71 y.o. female who returns in follow-up for medical problems include hyperlipidemia, hypothyroidism, DJD, osteoporosis, CKD stage III, and other medical problems. She is taking her medications and trying to follow her diet and remain physically active. She currently denies any chest pain, shortness of breath, palpitations, PND, orthopnea or other cardiorespiratory complaints. She notes no GI or  complaints. She notes no headaches, dizziness or neurologic complaints. She has no current changes of chronic arthritic complaints and no other complaints on complete review of systems. Current Outpatient Medications   Medication Sig Dispense Refill    atorvastatin (LIPITOR) 20 mg tablet Take 0.5 Tabs by mouth daily. 90 Tab 1    levothyroxine (SYNTHROID) 137 mcg tablet TAKE 137 MCG BY MOUTH DAILY (BEFORE BREAKFAST). 90 Tab 3    diclofenac EC (VOLTAREN) 75 mg EC tablet TAKE 1 TABLET BY MOUTH TWICE A  Tab 3    multivitamin (ONE A DAY) tablet Take 1 Tab by mouth daily.  alendronate (FOSAMAX) 70 mg tablet Take 70 mg by mouth.       sertraline (ZOLOFT) 50 mg tablet TAKE 1 TABLET(S) EVERY EVENING BY ORAL ROUTE.100mg  1     Past Medical History:   Diagnosis Date    Adverse effect of anesthesia     Pt states her daughter had an Anaphylactic reaction to Anesthesia    Arthritis     Back pain     Hypertension     Single episode    Menopause     Neck pain     Psychiatric disorder     Depression, Anxiety    Thyroid disease      Past Surgical History:   Procedure Laterality Date    HX ORTHOPAEDIC      Rotator Cuff Repair, Right    HX ORTHOPAEDIC      Carpal Tunnel Repair, Right     No Known Allergies    REVIEW OF SYSTEMS:  General: negative for - chills or fever, or weight loss or gain  ENT: negative for - headaches, nasal congestion or tinnitus  Eyes: no blurred or visual changes  Neck: No stiffness or swollen nodes  Respiratory: negative for - cough, hemoptysis, shortness of breath or wheezing  Cardiovascular : negative for - chest pain, edema, palpitations or shortness of breath  Gastrointestinal: negative for - abdominal pain, blood in stools, heartburn or nausea/vomiting  Genito-Urinary: no dysuria, trouble voiding, or hematuria  Musculoskeletal: negative for - gait disturbance, change of her chronic joint pain, joint stiffness or joint swelling  Neurological: no TIA or stroke symptoms  Hematologic: no bruises, no bleeding  Lymphatic: no swollen glands  Integument: no lumps, mole changes, nail changes or rash  Endocrine:no malaise/lethargy poly uria or polydipsia or unexpected weight changes        Social History     Socioeconomic History    Marital status:      Spouse name: Not on file    Number of children: Not on file    Years of education: Not on file    Highest education level: Not on file   Tobacco Use    Smoking status: Never Smoker    Smokeless tobacco: Never Used   Substance and Sexual Activity    Alcohol use: Yes     Comment: 3 Drinks per month    Drug use: No    Sexual activity: Not Currently     Family History   Problem Relation Age of Onset    Breast Cancer Paternal Grandmother         over 48    Stroke Mother     Heart Attack Father        OBJECTIVE:     Visit Vitals  /62 (BP 1 Location: Left arm, BP Patient Position: Sitting)   Pulse 73   Temp 98 °F (36.7 °C) (Oral)   Resp 16   Ht 4' 10\" (1.473 m)   Wt 116 lb 6.4 oz (52.8 kg)   SpO2 98%   BMI 24.33 kg/m²     CONSTITUTIONAL:   well nourished, appears age appropriate  EYES: sclera anicteric, PERRL, EOMI  ENMT:nares clear, moist mucous membranes, pharynx clear  NECK: supple.  Thyroid normal, No JVD or bruits  RESPIRATORY: Chest: clear to ascultation and percussion, normal inspiratory effort  CARDIOVASCULAR: Heart: regular rate and rhythm no murmurs, rubs or gallops, PMI not displaced, No thrills  GASTROINTESTINAL: Abdomen: non distended, soft, non tender, bowel sounds normal  HEMATOLOGIC: no purpura, petechiae or bruising  LYMPHATIC: No lymph node enlargemant  MUSCULOSKELETAL: Extremities: no edema or active synovitis, pulse 1+   INTEGUMENT: No unusual rashes or suspicious skin lesions noted. Nails appear normal.  PERIPHERAL VASCULAR: normal pulses femoral, PT and DP  NEUROLOGIC: non-focal exam, A & O X 3  PSYCHIATRIC:, appropriate affect     ASSESSMENT:   1. Mixed hyperlipidemia    2. Primary osteoarthritis involving multiple joints    3. Stage 3 chronic kidney disease (HCC)    4. Age-related osteoporosis without current pathological fracture    5. Acquired hypothyroidism      Impression  1. Hyperlipidemia prior lab reviewed and repeat status pending I will adjust if needed. 2.  DJD that is stable  3. CKD stage III repeat status pending  4. Osteoporosis stable on last check and reviewed that bone density  5. Hypothyroidism stable on last check  I will call with lab and make further recommendations or adjustments if necessary. Follow-up in 3 months or sooner if there is a problem. PLAN:  .  Orders Placed This Encounter    METABOLIC PANEL, COMPREHENSIVE ([facility])    LIPID PANEL ([facility])    CK ([facility])         ATTENTION:   This medical record was transcribed using an electronic medical records system. Although proofread, it may and can contain electronic and spelling errors. Other human spelling and other errors may be present. Corrections may be executed at a later time. Please feel free to contact us for any clarifications as needed. Follow-up and Dispositions    · Return in about 3 months (around 4/30/2020). No results found for any visits on 01/31/20. Arron Guerin MD    The patient verbalized understanding of the problems and plans as explained.

## 2020-01-31 ENCOUNTER — OFFICE VISIT (OUTPATIENT)
Dept: INTERNAL MEDICINE CLINIC | Age: 65
End: 2020-01-31

## 2020-01-31 VITALS
RESPIRATION RATE: 16 BRPM | OXYGEN SATURATION: 98 % | HEIGHT: 58 IN | DIASTOLIC BLOOD PRESSURE: 62 MMHG | SYSTOLIC BLOOD PRESSURE: 110 MMHG | WEIGHT: 116.4 LBS | HEART RATE: 73 BPM | BODY MASS INDEX: 24.43 KG/M2 | TEMPERATURE: 98 F

## 2020-01-31 DIAGNOSIS — M15.9 PRIMARY OSTEOARTHRITIS INVOLVING MULTIPLE JOINTS: ICD-10-CM

## 2020-01-31 DIAGNOSIS — M81.0 AGE-RELATED OSTEOPOROSIS WITHOUT CURRENT PATHOLOGICAL FRACTURE: ICD-10-CM

## 2020-01-31 DIAGNOSIS — N18.30 STAGE 3 CHRONIC KIDNEY DISEASE (HCC): ICD-10-CM

## 2020-01-31 DIAGNOSIS — E78.2 MIXED HYPERLIPIDEMIA: Primary | ICD-10-CM

## 2020-01-31 DIAGNOSIS — E03.9 ACQUIRED HYPOTHYROIDISM: ICD-10-CM

## 2020-01-31 LAB
A-G RATIO,AGRAT: 1.5 RATIO
ALBUMIN SERPL-MCNC: 4.8 G/DL (ref 3.9–5.4)
ALP SERPL-CCNC: 65 U/L (ref 38–126)
ALT SERPL-CCNC: 42 U/L (ref 0–35)
ANION GAP SERPL CALC-SCNC: 13 MMOL/L
AST SERPL W P-5'-P-CCNC: 44 U/L (ref 14–36)
BILIRUB SERPL-MCNC: 0.5 MG/DL (ref 0.2–1.3)
BUN SERPL-MCNC: 16 MG/DL (ref 7–17)
BUN/CREATININE RATIO,BUCR: 18 RATIO
CALCIUM SERPL-MCNC: 10.1 MG/DL (ref 8.4–10.2)
CHLORIDE SERPL-SCNC: 102 MMOL/L (ref 98–107)
CHOL/HDL RATIO,CHHD: 2 RATIO (ref 0–4)
CHOLEST SERPL-MCNC: 207 MG/DL (ref 0–200)
CK SERPL-CCNC: 155 U/L (ref 30–135)
CO2 SERPL-SCNC: 29 MMOL/L (ref 22–32)
CREAT SERPL-MCNC: 0.9 MG/DL (ref 0.7–1.2)
GLOBULIN,GLOB: 3.2
GLUCOSE SERPL-MCNC: 93 MG/DL (ref 65–105)
HDLC SERPL-MCNC: 97 MG/DL (ref 35–130)
LDL/HDL RATIO,LDHD: 1 RATIO
LDLC SERPL CALC-MCNC: 93 MG/DL (ref 0–130)
POTASSIUM SERPL-SCNC: 4.1 MMOL/L (ref 3.6–5)
PROT SERPL-MCNC: 8 G/DL (ref 6.3–8.2)
SODIUM SERPL-SCNC: 144 MMOL/L (ref 137–145)
TRIGL SERPL-MCNC: 83 MG/DL (ref 0–200)
VLDLC SERPL CALC-MCNC: 17 MG/DL

## 2020-01-31 NOTE — PROGRESS NOTES
Patricio Da Silva is a 71 y.o. female     Chief Complaint   Patient presents with    Cholesterol Problem     3 month follow up    Thyroid Problem     3 month follow up       Visit Vitals  /62 (BP 1 Location: Left arm, BP Patient Position: Sitting)   Pulse 73   Temp 98 °F (36.7 °C) (Oral)   Resp 16   Ht 4' 10\" (1.473 m)   Wt 116 lb 6.4 oz (52.8 kg)   SpO2 98%   BMI 24.33 kg/m²       Health Maintenance Due   Topic Date Due    DTaP/Tdap/Td series (1 - Tdap) 10/07/1965    Shingrix Vaccine Age 50> (1 of 2) 10/07/2004    GLAUCOMA SCREENING Q2Y  10/07/2019    Pneumococcal 65+ years (1 of 1 - PPSV23) 10/07/2019       1. Have you been to the ER, urgent care clinic since your last visit? Hospitalized since your last visit? No    2. Have you seen or consulted any other health care providers outside of the [address] [place] [address] since your last visit? Include any pap smears or colon screening.  No

## 2020-01-31 NOTE — PATIENT INSTRUCTIONS
Arthritis: Care Instructions Your Care Instructions Arthritis, also called osteoarthritis, is a breakdown of the cartilage that cushions your joints. When the cartilage wears down, your bones rub against each other. This causes pain and stiffness. Many people have some arthritis as they age. Arthritis most often affects the joints of the spine, hands, hips, knees, or feet. You can take simple measures to protect your joints, ease your pain, and help you stay active. Follow-up care is a key part of your treatment and safety. Be sure to make and go to all appointments, and call your doctor if you are having problems. It's also a good idea to know your test results and keep a list of the medicines you take. How can you care for yourself at home? · Stay at a healthy weight. Being overweight puts extra strain on your joints. · Talk to your doctor or physical therapist about exercises that will help ease joint pain. ? Stretch. You may enjoy gentle forms of yoga to help keep your joints and muscles flexible. ? Walk instead of jog. Other types of exercise that are less stressful on the joints include riding a bicycle, swimming, ambrocio chi, or water exercise. ? Lift weights. Strong muscles help reduce stress on your joints. Stronger thigh muscles, for example, take some of the stress off of the knees and hips. Learn the right way to lift weights so you do not make joint pain worse. · Take your medicines exactly as prescribed. Call your doctor if you think you are having a problem with your medicine. · Take pain medicines exactly as directed. ? If the doctor gave you a prescription medicine for pain, take it as prescribed. ? If you are not taking a prescription pain medicine, ask your doctor if you can take an over-the-counter medicine. · Use a cane, crutch, walker, or another device if you need help to get around. These can help rest your joints.  You also can use other things to make life easier, such as a higher toilet seat and padded handles on kitchen utensils. · Do not sit in low chairs, which can make it hard to get up. · Put heat or cold on your sore joints as needed. Use whichever helps you most. You also can take turns with hot and cold packs. ? Apply heat 2 or 3 times a day for 20 to 30 minutesusing a heating pad, hot shower, or hot packto relieve pain and stiffness. ? Put ice or a cold pack on your sore joint for 10 to 20 minutes at a time. Put a thin cloth between the ice and your skin. When should you call for help? Call your doctor now or seek immediate medical care if: 
  · You have sudden swelling, warmth, or pain in any joint.  
  · You have joint pain and a fever or rash.  
  · You have such bad pain that you cannot use a joint.  
 Watch closely for changes in your health, and be sure to contact your doctor if: 
  · You have mild joint symptoms that continue even with more than 6 weeks of care at home.  
  · You have stomach pain or other problems with your medicine. Where can you learn more? Go to http://rose-velvet.info/. Enter V117 in the search box to learn more about \"Arthritis: Care Instructions. \" Current as of: April 1, 2019 Content Version: 12.2 © 4259-9124 [employer]. Care instructions adapted under license by [employer] (which disclaims liability or warranty for this information). If you have questions about a medical condition or this instruction, always ask your healthcare professional. [place] [address] any warranty or liability for your use of this information.

## 2020-02-04 RX ORDER — ATORVASTATIN CALCIUM 10 MG/1
TABLET, FILM COATED ORAL
Qty: 90 TAB | Refills: 3 | Status: SHIPPED | OUTPATIENT
Start: 2020-02-04 | End: 2020-06-05 | Stop reason: ALTCHOICE

## 2020-02-04 NOTE — TELEPHONE ENCOUNTER
RX refill request from the patient/pharmacy. Patient last seen 01- with labs, and next appt. scheduled for 05-  Requested Prescriptions     Pending Prescriptions Disp Refills    atorvastatin (LIPITOR) 10 mg tablet [Pharmacy Med Name: ATORVASTATIN 10 MG TABLET] 90 Tab 3     Sig: TAKE 1 TABLET BY MOUTH EVERY DAY   .

## 2020-05-13 ENCOUNTER — OFFICE VISIT (OUTPATIENT)
Dept: URGENT CARE | Age: 65
End: 2020-05-13

## 2020-05-13 VITALS — OXYGEN SATURATION: 97 % | HEART RATE: 67 BPM | RESPIRATION RATE: 16 BRPM | TEMPERATURE: 98.1 F

## 2020-05-13 DIAGNOSIS — Z20.822 EXPOSURE TO COVID-19 VIRUS: Primary | ICD-10-CM

## 2020-05-13 DIAGNOSIS — J02.9 SORE THROAT: ICD-10-CM

## 2020-05-13 DIAGNOSIS — R05.9 COUGH: ICD-10-CM

## 2020-05-13 NOTE — PROGRESS NOTES
The history is provided by the patient. Cold Symptoms   The history is provided by the patient. This is a new problem. The current episode started more than 1 week ago. The cough is non-productive. There has been no fever. Associated symptoms include sore throat. Pertinent negatives include no chills, no shortness of breath and no wheezing. She has tried nothing for the symptoms.         Past Medical History:   Diagnosis Date    Adverse effect of anesthesia     Pt states her daughter had an Anaphylactic reaction to Anesthesia    Arthritis     Back pain     Hypertension     Single episode    Menopause     Neck pain     Psychiatric disorder     Depression, Anxiety    Thyroid disease         Past Surgical History:   Procedure Laterality Date    HX ORTHOPAEDIC      Rotator Cuff Repair, Right    HX ORTHOPAEDIC      Carpal Tunnel Repair, Right         Family History   Problem Relation Age of Onset    Breast Cancer Paternal Grandmother         over 48    Stroke Mother     Heart Attack Father         Social History     Socioeconomic History    Marital status:      Spouse name: Not on file    Number of children: Not on file    Years of education: Not on file    Highest education level: Not on file   Occupational History    Not on file   Social Needs    Financial resource strain: Not on file    Food insecurity     Worry: Not on file     Inability: Not on file    Transportation needs     Medical: Not on file     Non-medical: Not on file   Tobacco Use    Smoking status: Never Smoker    Smokeless tobacco: Never Used   Substance and Sexual Activity    Alcohol use: Yes     Comment: 3 Drinks per month    Drug use: No    Sexual activity: Not Currently   Lifestyle    Physical activity     Days per week: Not on file     Minutes per session: Not on file    Stress: Not on file   Relationships    Social connections     Talks on phone: Not on file     Gets together: Not on file     Attends Tenriism service: Not on file     Active member of club or organization: Not on file     Attends meetings of clubs or organizations: Not on file     Relationship status: Not on file    Intimate partner violence     Fear of current or ex partner: Not on file     Emotionally abused: Not on file     Physically abused: Not on file     Forced sexual activity: Not on file   Other Topics Concern    Not on file   Social History Narrative    Not on file                ALLERGIES: Patient has no known allergies. Review of Systems   Constitutional: Negative for chills, fatigue and fever. HENT: Positive for sore throat. Negative for congestion and trouble swallowing. Respiratory: Positive for cough. Negative for chest tightness, shortness of breath and wheezing. Cardiovascular: Negative. Gastrointestinal: Negative. Genitourinary: Negative. Musculoskeletal: Negative. Neurological: Negative. Vitals:    05/13/20 1313   Pulse: 67   Resp: 16   Temp: 98.1 °F (36.7 °C)   SpO2: 97%       Physical Exam  Constitutional:       General: She is not in acute distress. Appearance: Normal appearance. She is well-developed. She is not ill-appearing. HENT:      Mouth/Throat:      Pharynx: No oropharyngeal exudate or posterior oropharyngeal erythema. Cardiovascular:      Rate and Rhythm: Normal rate and regular rhythm. Heart sounds: Normal heart sounds. Pulmonary:      Effort: Pulmonary effort is normal.      Breath sounds: Normal breath sounds. No wheezing or rhonchi. Musculoskeletal: Normal range of motion. Neurological:      Mental Status: She is alert and oriented to person, place, and time. Psychiatric:         Mood and Affect: Mood normal.         MDM     Differential Diagnosis; Clinical Impression; Plan:     (R60.824) Exposure to COVID-19 virus  (primary encounter diagnosis)  (J02.9) Sore throat  (R05) Cough  No orders of the defined types were placed in this encounter.     Tested patient for COVID-19. Patient given education material.  The condition was discussed with the patient and they understand. If symptoms worsen the pt is to go to the ER. Advised patient to take tylenol for discomfort. Advised to quarantine self. This patient was seen in [facility] at [address] [place] [address] [facility] while in their vehicle due to COVID-19 pandemic with PPE and focused examination in order to decrease community viral transmission. The patient/guardian gave verbal consent to treat.           Procedures

## 2020-05-17 LAB — SARS-COV-2, NAA: NOT DETECTED

## 2020-06-05 ENCOUNTER — OFFICE VISIT (OUTPATIENT)
Dept: INTERNAL MEDICINE CLINIC | Age: 65
End: 2020-06-05

## 2020-06-05 VITALS
TEMPERATURE: 97.7 F | BODY MASS INDEX: 24.56 KG/M2 | HEART RATE: 80 BPM | DIASTOLIC BLOOD PRESSURE: 74 MMHG | HEIGHT: 58 IN | WEIGHT: 117 LBS | OXYGEN SATURATION: 98 % | RESPIRATION RATE: 16 BRPM | SYSTOLIC BLOOD PRESSURE: 118 MMHG

## 2020-06-05 DIAGNOSIS — F32.A DEPRESSION, UNSPECIFIED DEPRESSION TYPE: Primary | ICD-10-CM

## 2020-06-05 NOTE — PROGRESS NOTES
Subjective:   Zahida Simons is a 71 y.o. female      Chief Complaint   Patient presents with    Anxiety    Depression    Follow-up        History of present illness: She presents today for evaluation and treatment of anxiety and depression. She was seeing a nurse practitioner for psychiatrist and was on Zoloft 75 mg a day and seemed to be doing fairly well however the nurse practitioner is no longer with the practice and so she weaned herself off of her Zoloft hoping that she would be able to do well off of it. She has been off of it for about 2 weeks and seems to be very depressed and said she had some suicidal thoughts but no suicidal ideations and she says that she would never be able to harm her self. She currently denies any chest pain, shortness of breath, palpitations, PND, orthopnea or other cardiorespiratory complaints. There are no GI or  complaints. She notes no headaches or other complaints. She is otherwise taking her medications. She is overdue for follow-up.     Patient Active Problem List   Diagnosis Code    Bilateral carpal tunnel syndrome G56.03    Cervical radiculopathy due to degenerative joint disease of spine M47.22    Acquired hypothyroidism E03.9    Age-related osteoporosis without current pathological fracture M81.0    Primary osteoarthritis involving multiple joints M89.49    Stage 3 chronic kidney disease (HCC) N18.3    Mixed hyperlipidemia E78.2    Elevated liver enzymes R74.8    Common bile duct dilation K83.8    Gastroenteritis K52.9    Cervical spinal stenosis M48.02    Degenerative disc disease, cervical M50.30    Herniation of cervical intervertebral disc M50.20    S/P cervical spinal fusion Z98.1    Acute cystitis N30.00    Acute pain of right knee M25.561    Depression F32.9      Past Medical History:   Diagnosis Date    Adverse effect of anesthesia     Pt states her daughter had an Anaphylactic reaction to Anesthesia    Arthritis     Back pain     Hypertension     Single episode    Menopause     Neck pain     Psychiatric disorder     Depression, Anxiety    Thyroid disease       No Known Allergies   Family History   Problem Relation Age of Onset    Breast Cancer Paternal Grandmother         over 48    Stroke Mother     Heart Attack Father       Social History     Socioeconomic History    Marital status:      Spouse name: Not on file    Number of children: Not on file    Years of education: Not on file    Highest education level: Not on file   Occupational History    Not on file   Social Needs    Financial resource strain: Not on file    Food insecurity     Worry: Not on file     Inability: Not on file    Transportation needs     Medical: Not on file     Non-medical: Not on file   Tobacco Use    Smoking status: Never Smoker    Smokeless tobacco: Never Used   Substance and Sexual Activity    Alcohol use: Yes     Comment: 3 Drinks per month    Drug use: No    Sexual activity: Not Currently   Lifestyle    Physical activity     Days per week: Not on file     Minutes per session: Not on file    Stress: Not on file   Relationships    Social connections     Talks on phone: Not on file     Gets together: Not on file     Attends Buddhist service: Not on file     Active member of club or organization: Not on file     Attends meetings of clubs or organizations: Not on file     Relationship status: Not on file    Intimate partner violence     Fear of current or ex partner: Not on file     Emotionally abused: Not on file     Physically abused: Not on file     Forced sexual activity: Not on file   Other Topics Concern    Not on file   Social History Narrative    Not on file     Prior to Admission medications    Medication Sig Start Date End Date Taking? Authorizing Provider   atorvastatin (LIPITOR) 20 mg tablet Take 0.5 Tabs by mouth daily.  11/12/19  Yes Liza Estrada MD   levothyroxine (SYNTHROID) 137 mcg tablet TAKE 137 MCG BY MOUTH DAILY (BEFORE BREAKFAST). 10/30/19  Yes Jacklyn Dubose MD   diclofenac EC (VOLTAREN) 75 mg EC tablet TAKE 1 TABLET BY MOUTH TWICE A DAY 1/31/19  Yes Jacklyn Dubose MD   multivitamin (ONE A DAY) tablet Take 1 Tab by mouth daily. Yes Provider, Historical   alendronate (FOSAMAX) 70 mg tablet Take 70 mg by mouth. 5/18/17  Yes Provider, Historical        Review of Systems              Constitutional:  She denies fever, weight loss, sweats or fatigue. EYES: No blurred or double vision,               ENT: no nasal congestion, no headache or dizziness. No difficulty with               swallowing, taste, speech or smell. Respiratory:  No cough, wheezing or shortness of breath. No sputum production. Cardiac:  Denies chest pain, palpitations, unexplained indigestion, syncope, edema, PND or orthopnea. GI:  No changes in bowel movements, no abdominal pain, no bloating, anorexia, nausea, vomiting or heartburn. :  No frequency or dysuria. Denies incontinence or sexual dysfunction. Extremities:  No joint pain, stiffness or swelling  Back:.no pain or soreness  Skin:  No recent rashes or mole changes. Neurological:  No numbness, tingling, burning paresthesias or loss of motor strength. No syncope, dizziness, frequent headaches or memory loss. Hematologic:  No easy bruising  Lymphatic: No lymph node enlargement  Psychiatric: Anxiety depression with some suicidal thoughts but no ideations or intentions and says she would never harm her self. Objective:     Vitals:    06/05/20 1506   BP: 118/74   Pulse: 80   Resp: 16   Temp: 97.7 °F (36.5 °C)   SpO2: 98%   Weight: 117 lb (53.1 kg)   Height: 4' 10\" (1.473 m)   PainSc:   0 - No pain       Body mass index is 24.45 kg/m². Physical Examination:              General Appearance:  Well-developed, well-nourished, no acute distress. HEENT:      Ears:  The TMs and ear canals were clear.   Eyes:  The pupillary responses were normal. Extraocular muscle function intact. Lids and conjunctiva not injected. Funduscopic exam revealed sharp disc margins. Nares: Clear w/o edema or erythema  Pharynx:  Clear with teeth in good repair. No masses were noted. Neck:  Supple without thyromegaly or adenopathy. No JVD noted. No carotid                bruits. Lungs:  Clear to auscultation and percussion. Cardiac:  Regular rate and rhythm without murmur. PMI not displaced. No gallop, rub or click. Abdominal: Soft, non-tender, no hepata-spleenomegally or masses  Extremities:  No clubbing, cyanosis or edema. Skin:  No rash or unusual mole changes noted. Lymph Nodes:  None felt in the cervical, supraclavicular, axillary or inguinal region. Neurological: . DTRs 2+ and symmetric. Station and gait normal.   Hematologic:   No purpura or petechiae        Assessment/Plan:         1. Depression, unspecified depression type        Impressions/Plan:  Impression  1. Depression we will treat that with Zoloft but will go with 100 mg daily. I will recheck her in 2 to 3 weeks at which time she is also due for a checkup which she is actually overdue for. I will see how she is doing at that time. I will see her sooner should it be problems. No orders of the defined types were placed in this encounter. Follow-up and Dispositions    · Return in about 3 weeks (around 6/26/2020). No results found for any visits on 06/05/20. Brendan Coello MD    The patient was given after the visit summary the patient verbalized an understanding of the plans and problems as explained.

## 2020-06-05 NOTE — PATIENT INSTRUCTIONS
Learning About Statins for People With Diabetes Introduction Statins are medicines that help with your cholesterol. Cholesterol is a type of fat in your blood. If you have too much of this fat, it can build up in blood vessels. This raises your risk of heart disease, heart attack, and stroke. Many people with diabetes take statins. Diabetes can cause problems in your body that may also lead to heart disease. That means your risks of heart attack and stroke are higher when you have diabetes. Statins can lower your risk. Your doctor may prescribe a statin if: 
· You have diabetes. · AND you are age 36 to 76. Statins may help people with diabetes at other ages too. Your doctor can help you decide if statins may help you. Examples Common statins include: · Atorvastatin (Lipitor). · Pravastatin (Pravachol). · Rosuvastatin (Crestor). · Simvastatin (Zocor). Possible side effects Some people who take statins report that they have more muscle aches. But it's not clear whether these are actually a side effect of statins. Most side effects will go away if you stop taking the medicine. You may have other side effects not described here. Check the information that comes with your medicine. What to know about taking this medicine · You must take statins on a regular basis for them to work well. If you stop, your risk for heart attack and stroke may go back up. · Be safe with medicines. Take your medicines exactly as prescribed. Call your doctor if you think you are having a problem with your medicine. · If you have side effects that bother you, talk to your doctor. You may be able to take a different statin. · Check with your doctor or pharmacist before you use any other medicines. This includes over-the-counter medicines. Make sure your doctor knows all of the medicines, vitamins, herbal products, and supplements you take. Taking some medicines together can cause problems. Where can you learn more? Go to http://rose-velvet.info/ Enter I707 in the search box to learn more about \"Learning About Statins for People With Diabetes. \" Current as of: December 20, 2019               Content Version: 12.5 © 3109-1672 [employer]. Care instructions adapted under license by [employer] (which disclaims liability or warranty for this information). If you have questions about a medical condition or this instruction, always ask your healthcare professional. [address] any warranty or liability for your use of this information.

## 2020-06-05 NOTE — PROGRESS NOTES
Room:     Identified pt with two pt identifiers(name and ). Reviewed record in preparation for visit and have obtained necessary documentation. All patient medications has been reviewed. Chief Complaint   Patient presents with    Anxiety    Depression    Follow-up       Health Maintenance Due   Topic    DTaP/Tdap/Td series (1 - Tdap)    Shingrix Vaccine Age 50> (1 of 2)    GLAUCOMA SCREENING Q2Y     Pneumococcal 65+ years (1 of 1 - PPSV23)       There were no vitals filed for this visit. 1.Have you traveled outside of the [address] [place] [address] in the last month No    2. Have you been in close contact with someone who is suspected to have COVID-19 or has tested positive. No    3. Do you have any signs or symptoms. ? 4. Have you been to the ER, urgent care clinic since your last visit? Hospitalized since your last visit? No    5. Have you seen or consulted any other health care providers outside of the [address] [place] [address] since your last visit? Include any pap smears or colon screening. No        7. Are you fasting for blood work today? NO    8. Do you have an Advanced Directive/ Living Will in place? NO  If yes, do we have a copy on file YES  If no, would you like information YES    Patient is accompanied by self I have received verbal consent from Hannah Garcia to discuss any/all medical information while they are present in the room.

## 2020-06-06 RX ORDER — SERTRALINE HYDROCHLORIDE 100 MG/1
100 TABLET, FILM COATED ORAL DAILY
Qty: 30 TAB | Refills: 2 | Status: SHIPPED | OUTPATIENT
Start: 2020-06-06 | End: 2020-06-08 | Stop reason: SDUPTHER

## 2020-06-08 RX ORDER — SERTRALINE HYDROCHLORIDE 100 MG/1
100 TABLET, FILM COATED ORAL DAILY
Qty: 90 TAB | Refills: 1 | Status: SHIPPED | OUTPATIENT
Start: 2020-06-08 | End: 2020-12-17

## 2020-06-08 NOTE — TELEPHONE ENCOUNTER
RX refill request from the patient/pharmacy. Patient last seen 06- with labs, and next appt. scheduled for 06-  Requested Prescriptions     Pending Prescriptions Disp Refills    sertraline (ZOLOFT) 100 mg tablet 90 Tab 1     Sig: Take 1 Tab by mouth daily. Phyllis Miller

## 2020-06-16 NOTE — PROGRESS NOTES
Chief Complaint   Patient presents with    Cholesterol Problem     3 month follow up    Thyroid Problem    Depression     2 week follow up       SUBJECTIVE:    Jose F Lyle is a 71 y.o. female she presents today in follow-up for her medical problems include hyperlipidemia, DJD, CKD stage III, and recent evaluation for depression at which time she was started on Zoloft 100. Her state of mind seems to be doing much better. She does seem to be still having a little depression but that is improving. As far as her cholesterol she is taking her medication. She denies any chest pain, shortness of breath, palpitations, PND, orthopnea cardiorespiratory complaints. She does have a mild sore throat but no head congestion or runny nose and no cough. She also notes some neck pain in the posterior aspect of her neck that seem to go over the both shoulders with no history of trauma. She notes no other arthritic complaints. She has no GI or  complaints. She has no headaches, dizziness or neurologic complaints. Current Outpatient Medications   Medication Sig Dispense Refill    predniSONE (STERAPRED) 5 mg dose pack See administration instruction per 5mg dose pack 21 Tab 0    sertraline (ZOLOFT) 100 mg tablet Take 1 Tab by mouth daily. 90 Tab 1    atorvastatin (LIPITOR) 20 mg tablet Take 0.5 Tabs by mouth daily. 90 Tab 1    levothyroxine (SYNTHROID) 137 mcg tablet TAKE 137 MCG BY MOUTH DAILY (BEFORE BREAKFAST). 90 Tab 3    diclofenac EC (VOLTAREN) 75 mg EC tablet TAKE 1 TABLET BY MOUTH TWICE A  Tab 3    multivitamin (ONE A DAY) tablet Take 1 Tab by mouth daily.  alendronate (FOSAMAX) 70 mg tablet Take 70 mg by mouth.        Past Medical History:   Diagnosis Date    Adverse effect of anesthesia     Pt states her daughter had an Anaphylactic reaction to Anesthesia    Arthritis     Back pain     Hypertension     Single episode    Menopause     Neck pain     Psychiatric disorder Depression, Anxiety    Thyroid disease      Past Surgical History:   Procedure Laterality Date    HX ORTHOPAEDIC      Rotator Cuff Repair, Right    HX ORTHOPAEDIC      Carpal Tunnel Repair, Right     No Known Allergies    REVIEW OF SYSTEMS:  General: negative for - chills or fever, or weight loss or gain  ENT: negative for - headaches, nasal congestion or tinnitus. Positive for sore throat  Eyes: no blurred or visual changes  Neck: No stiffness or swollen nodes.   Positive neck pain posteriorly radiating to both shoulders  Respiratory: negative for - cough, hemoptysis, shortness of breath or wheezing  Cardiovascular : negative for - chest pain, edema, palpitations or shortness of breath  Gastrointestinal: negative for - abdominal pain, blood in stools, heartburn or nausea/vomiting  Genito-Urinary: no dysuria, trouble voiding, or hematuria  Musculoskeletal: negative for - gait disturbance, joint pain, joint stiffness or joint swelling  Neurological: no TIA or stroke symptoms  Hematologic: no bruises, no bleeding  Lymphatic: no swollen glands  Integument: no lumps, mole changes, nail changes or rash  Endocrine:no malaise/lethargy poly uria or polydipsia or unexpected weight changes        Social History     Socioeconomic History    Marital status:      Spouse name: Not on file    Number of children: Not on file    Years of education: Not on file    Highest education level: Not on file   Tobacco Use    Smoking status: Never Smoker    Smokeless tobacco: Never Used   Substance and Sexual Activity    Alcohol use: Yes     Comment: 3 Drinks per month    Drug use: No    Sexual activity: Not Currently     Family History   Problem Relation Age of Onset    Breast Cancer Paternal Grandmother         over 48    Stroke Mother     Heart Attack Father        OBJECTIVE:     Visit Vitals  /72 (BP 1 Location: Left arm, BP Patient Position: Sitting)   Pulse 85   Temp 98.6 °F (37 °C) (Oral)   Resp 17   Ht 4' 10\" (1.473 m)   Wt 116 lb 6.4 oz (52.8 kg)   SpO2 99%   BMI 24.33 kg/m²     CONSTITUTIONAL:   well nourished, appears age appropriate  EYES: sclera anicteric, PERRL, EOMI  ENMT:nares clear, moist mucous membranes, pharynx clear  NECK: supple with mild discomfort posteriorly with no erythema increased temperature or soft tissue swelling. Thyroid normal, No JVD or bruits  RESPIRATORY: Chest: clear to ascultation and percussion, normal inspiratory effort  CARDIOVASCULAR: Heart: regular rate and rhythm no murmurs, rubs or gallops, PMI not displaced, No thrills, no peripheral edema  GASTROINTESTINAL: Abdomen: non distended, soft, non tender, bowel sounds normal  HEMATOLOGIC: no purpura, petechiae or bruising  LYMPHATIC: No lymph node enlargemant  MUSCULOSKELETAL: Extremities: no active synovitis, pulse 1+   INTEGUMENT: No unusual rashes or suspicious skin lesions noted. Nails appear normal.  PERIPHERAL VASCULAR: normal pulses femoral, PT and DP  NEUROLOGIC: non-focal exam, A & O X 3  PSYCHIATRIC:, appropriate affect     ASSESSMENT:   1. Depression, unspecified depression type    2. Neck pain    3. Depression, major, recurrent, moderate ([address]    4. Viral URI    5. Mixed hyperlipidemia    6. Primary osteoarthritis involving multiple joints    7. Stage 3 chronic kidney disease (HCC)      Impression  1. Depression that seems to be stable with the Zoloft so continue current dose  2. Neck pain x-ray obtained today reveal some narrowing at C4-5 and previous fusion at C5-6 and C6-7 we will try a steroid Dosepak and if this is not effective will probably need to have another MRI  3. Hypertension is controlled  4. Viral URI no think this needs any treatment other than symptomatic  5. DJD stable in other areas  6. Hyperlipidemia she is not fasting today so we will have to get her back for 3-month evaluation and all of that  7.   CKD we will have to get her set up to return for lab studies  I will set her up to return within the next month and she is to come in for evaluation of her other multiple medical problems and follow-up of her depression and neck discomfort at that time. If her neck actually gets worse was not improved and I think MRI is warranted. That is all discussed in detail with the patient. PLAN:  .  Orders Placed This Encounter    XR SPINE CERV PA LAT ODONT 3 V MAX    predniSONE (STERAPRED) 5 mg dose pack         ATTENTION:   This medical record was transcribed using an electronic medical records system. Although proofread, it may and can contain electronic and spelling errors. Other human spelling and other errors may be present. Corrections may be executed at a later time. Please feel free to contact us for any clarifications as needed. Follow-up and Dispositions    · Return in about 3 months (around 9/17/2020). No results found for any visits on 06/17/20. Shahida Pérez MD    The patient verbalized understanding of the problems and plans as explained.

## 2020-06-17 ENCOUNTER — OFFICE VISIT (OUTPATIENT)
Dept: INTERNAL MEDICINE CLINIC | Age: 65
End: 2020-06-17

## 2020-06-17 VITALS
TEMPERATURE: 98.6 F | RESPIRATION RATE: 17 BRPM | HEART RATE: 85 BPM | OXYGEN SATURATION: 99 % | WEIGHT: 116.4 LBS | SYSTOLIC BLOOD PRESSURE: 108 MMHG | HEIGHT: 58 IN | DIASTOLIC BLOOD PRESSURE: 72 MMHG | BODY MASS INDEX: 24.43 KG/M2

## 2020-06-17 DIAGNOSIS — E78.2 MIXED HYPERLIPIDEMIA: ICD-10-CM

## 2020-06-17 DIAGNOSIS — M54.2 NECK PAIN: ICD-10-CM

## 2020-06-17 DIAGNOSIS — F32.A DEPRESSION, UNSPECIFIED DEPRESSION TYPE: Primary | ICD-10-CM

## 2020-06-17 DIAGNOSIS — N18.30 STAGE 3 CHRONIC KIDNEY DISEASE (HCC): ICD-10-CM

## 2020-06-17 DIAGNOSIS — J06.9 VIRAL URI: ICD-10-CM

## 2020-06-17 DIAGNOSIS — F33.1 DEPRESSION, MAJOR, RECURRENT, MODERATE (HCC): ICD-10-CM

## 2020-06-17 DIAGNOSIS — M15.9 PRIMARY OSTEOARTHRITIS INVOLVING MULTIPLE JOINTS: ICD-10-CM

## 2020-06-17 RX ORDER — PREDNISONE 5 MG/1
TABLET ORAL
Qty: 21 TAB | Refills: 0 | Status: SHIPPED | OUTPATIENT
Start: 2020-06-17 | End: 2020-07-17 | Stop reason: ALTCHOICE

## 2020-06-17 NOTE — PATIENT INSTRUCTIONS
Neck: Exercises  Introduction  Here are some examples of exercises for you to try. The exercises may be suggested for a condition or for rehabilitation. Start each exercise slowly. Ease off the exercises if you start to have pain. You will be told when to start these exercises and which ones will work best for you. How to do the exercises  Neck stretch   1. This stretch works best if you keep your shoulder down as you lean away from it. To help you remember to do this, start by relaxing your shoulders and lightly holding on to your thighs or your chair. 2. Tilt your head toward your shoulder and hold for 15 to 30 seconds. Let the weight of your head stretch your muscles. 3. If you would like a little added stretch, use your hand to gently and steadily pull your head toward your shoulder. For example, keeping your right shoulder down, lean your head to the left. 4. Repeat 2 to 4 times toward each shoulder. Diagonal neck stretch   1. Turn your head slightly toward the direction you will be stretching, and tilt your head diagonally toward your chest and hold for 15 to 30 seconds. 2. If you would like a little added stretch, use your hand to gently and steadily pull your head forward on the diagonal.  3. Repeat 2 to 4 times toward each side. Dorsal glide stretch   The dorsal glide stretches the back of the neck. If you feel pain, do not glide so far back. Some people find this exercise easier to do while lying on their backs with an ice pack on the neck. 1. Sit or stand tall and look straight ahead. 2. Slowly tuck your chin as you glide your head backward over your body  3. Hold for a count of 6, and then relax for up to 10 seconds. 4. Repeat 8 to 12 times. Chest and shoulder stretch   1. Sit or stand tall and glide your head backward as in the dorsal glide stretch. 2. Raise both arms so that your hands are next to your ears.   3. Take a deep breath, and as you breathe out, lower your elbows down and behind your back. You will feel your shoulder blades slide down and together, and at the same time you will feel a stretch across your chest and the front of your shoulders. 4. Hold for about 6 seconds, and then relax for up to 10 seconds. 5. Repeat 8 to 12 times. Strengthening: Hands on head   1. Move your head backward, forward, and side to side against gentle pressure from your hands, holding each position for about 6 seconds. 2. Repeat 8 to 12 times. Follow-up care is a key part of your treatment and safety. Be sure to make and go to all appointments, and call your doctor if you are having problems. It's also a good idea to know your test results and keep a list of the medicines you take. Where can you learn more? Go to http://rose-velvet.info/  Enter P975 in the search box to learn more about \"Neck: Exercises. \"  Current as of: March 2, 2020               Content Version: 12.5  © 2006-2020 Healthwise, Incorporated. Care instructions adapted under license by [employer] (which disclaims liability or warranty for this information). If you have questions about a medical condition or this instruction, always ask your healthcare professional. [address] any warranty or liability for your use of this information.

## 2020-06-17 NOTE — PROGRESS NOTES
Chief Complaint   Patient presents with    Depression     2 week follow up     Visit Vitals  /72 (BP 1 Location: Left arm, BP Patient Position: Sitting)   Pulse 85   Temp 98.6 °F (37 °C) (Oral)   Resp 17   Ht 4' 10\" (1.473 m)   Wt 116 lb 6.4 oz (52.8 kg)   SpO2 99%   BMI 24.33 kg/m²     1. Have you been to the ER, urgent care clinic since your last visit? Hospitalized since your last visit? No    2. Have you seen or consulted any other health care providers outside of the [address] [place] [address] since your last visit? Include any pap smears or colon screening.  No

## 2020-07-13 DIAGNOSIS — M19.90 ARTHRITIS: ICD-10-CM

## 2020-07-13 RX ORDER — DICLOFENAC SODIUM 75 MG/1
TABLET, DELAYED RELEASE ORAL
Qty: 180 TAB | Refills: 3 | Status: SHIPPED | OUTPATIENT
Start: 2020-07-13 | End: 2021-07-19

## 2020-07-13 NOTE — TELEPHONE ENCOUNTER
RX refill request from the patient/pharmacy. Patient last seen 06- with labs, and next appt. scheduled for 07-  Requested Prescriptions     Pending Prescriptions Disp Refills    diclofenac EC (VOLTAREN) 75 mg EC tablet 180 Tab 3     Sig: TAKE 1 TABLET BY MOUTH TWICE A DAY   .

## 2020-07-16 PROBLEM — F32.A DEPRESSION: Status: RESOLVED | Noted: 2020-06-05 | Resolved: 2020-07-16

## 2020-07-17 ENCOUNTER — OFFICE VISIT (OUTPATIENT)
Dept: INTERNAL MEDICINE CLINIC | Age: 65
End: 2020-07-17

## 2020-07-17 VITALS
HEIGHT: 58 IN | TEMPERATURE: 98.5 F | WEIGHT: 115.6 LBS | HEART RATE: 72 BPM | OXYGEN SATURATION: 98 % | BODY MASS INDEX: 24.27 KG/M2 | RESPIRATION RATE: 18 BRPM | SYSTOLIC BLOOD PRESSURE: 104 MMHG | DIASTOLIC BLOOD PRESSURE: 70 MMHG

## 2020-07-17 DIAGNOSIS — F33.1 DEPRESSION, MAJOR, RECURRENT, MODERATE (HCC): ICD-10-CM

## 2020-07-17 DIAGNOSIS — M81.0 AGE-RELATED OSTEOPOROSIS WITHOUT CURRENT PATHOLOGICAL FRACTURE: ICD-10-CM

## 2020-07-17 DIAGNOSIS — R07.1 CHEST PAIN ON BREATHING: ICD-10-CM

## 2020-07-17 DIAGNOSIS — E78.2 MIXED HYPERLIPIDEMIA: Primary | ICD-10-CM

## 2020-07-17 DIAGNOSIS — E03.9 ACQUIRED HYPOTHYROIDISM: ICD-10-CM

## 2020-07-17 DIAGNOSIS — M15.9 PRIMARY OSTEOARTHRITIS INVOLVING MULTIPLE JOINTS: ICD-10-CM

## 2020-07-17 DIAGNOSIS — N18.30 STAGE 3 CHRONIC KIDNEY DISEASE (HCC): ICD-10-CM

## 2020-07-17 LAB
A-G RATIO,AGRAT: 1.5 RATIO
ALBUMIN SERPL-MCNC: 4.6 G/DL (ref 3.9–5.4)
ALP SERPL-CCNC: 72 U/L (ref 38–126)
ALT SERPL-CCNC: 42 U/L (ref 0–35)
ANION GAP SERPL CALC-SCNC: 12 MMOL/L
AST SERPL W P-5'-P-CCNC: 40 U/L (ref 14–36)
BILIRUB SERPL-MCNC: 0.5 MG/DL (ref 0.2–1.3)
BUN SERPL-MCNC: 19 MG/DL (ref 7–17)
BUN/CREATININE RATIO,BUCR: 21 RATIO
CALCIUM SERPL-MCNC: 9.8 MG/DL (ref 8.4–10.2)
CHLORIDE SERPL-SCNC: 103 MMOL/L (ref 98–107)
CHOL/HDL RATIO,CHHD: 2 RATIO (ref 0–4)
CHOLEST SERPL-MCNC: 233 MG/DL (ref 0–200)
CK SERPL-CCNC: 142 U/L (ref 30–135)
CO2 SERPL-SCNC: 29 MMOL/L (ref 22–32)
CREAT SERPL-MCNC: 0.9 MG/DL (ref 0.7–1.2)
GLOBULIN,GLOB: 3.1
GLUCOSE SERPL-MCNC: 95 MG/DL (ref 65–105)
HDLC SERPL-MCNC: 113 MG/DL (ref 35–130)
LDL/HDL RATIO,LDHD: 1 RATIO
LDLC SERPL CALC-MCNC: 102 MG/DL (ref 0–130)
POTASSIUM SERPL-SCNC: 4.3 MMOL/L (ref 3.6–5)
PROT SERPL-MCNC: 7.7 G/DL (ref 6.3–8.2)
SODIUM SERPL-SCNC: 144 MMOL/L (ref 137–145)
TRIGL SERPL-MCNC: 90 MG/DL (ref 0–200)
VLDLC SERPL CALC-MCNC: 18 MG/DL

## 2020-07-17 NOTE — PROGRESS NOTES
Chief Complaint   Patient presents with    Cholesterol Problem     3 month follow up    Depression    Chronic Kidney Disease       SUBJECTIVE:    Vicky Gudino is a 71 y.o. female who returns in follow-up for medical problems include hyperlipidemia, DJD, CKD stage III, prior elevated liver enzymes, anxiety with depression and other multiple medical problems. In addition she has an acute problem of some chest discomfort where she sustained a fall going down her stairs yesterday with some hummingbird feeders in her hand and unfortunately struck her anterior chest against her railing. She notes it hurts to take a deep breath. She notes no shortness of breath. She denies any other cardiorespiratory complaints including no palpitations, PND, orthopnea. She denies any GI or  complaints. She notes no headaches, dizziness or neurologic complaints. There are no current arthritic complaints other than those related to the chest wall from the fall as noted. Current Outpatient Medications   Medication Sig Dispense Refill    diclofenac EC (VOLTAREN) 75 mg EC tablet TAKE 1 TABLET BY MOUTH TWICE A  Tab 3    sertraline (ZOLOFT) 100 mg tablet Take 1 Tab by mouth daily. 90 Tab 1    atorvastatin (LIPITOR) 20 mg tablet Take 0.5 Tabs by mouth daily. 90 Tab 1    levothyroxine (SYNTHROID) 137 mcg tablet TAKE 137 MCG BY MOUTH DAILY (BEFORE BREAKFAST). 90 Tab 3    multivitamin (ONE A DAY) tablet Take 1 Tab by mouth daily.  alendronate (FOSAMAX) 70 mg tablet Take 70 mg by mouth.        Past Medical History:   Diagnosis Date    Adverse effect of anesthesia     Pt states her daughter had an Anaphylactic reaction to Anesthesia    Arthritis     Back pain     Hypertension     Single episode    Menopause     Neck pain     Psychiatric disorder     Depression, Anxiety    Thyroid disease      Past Surgical History:   Procedure Laterality Date    HX ORTHOPAEDIC      Rotator Cuff Repair, Right    HX ORTHOPAEDIC      Carpal Tunnel Repair, Right     No Known Allergies    REVIEW OF SYSTEMS:  General: negative for - chills or fever, or weight loss or gain  ENT: negative for - headaches, nasal congestion or tinnitus  Eyes: no blurred or visual changes  Neck: No stiffness or swollen nodes  Respiratory: negative for - cough, hemoptysis, shortness of breath or wheezing  Cardiovascular : negative for - chest pain, edema, palpitations or shortness of breath  Gastrointestinal: negative for - abdominal pain, blood in stools, heartburn or nausea/vomiting  Genito-Urinary: no dysuria, trouble voiding, or hematuria  Musculoskeletal: negative for - gait disturbance, joint pain, joint stiffness or joint swelling.   Chest wall pain post fall yesterday  Neurological: no TIA or stroke symptoms  Hematologic: no bruises, no bleeding  Lymphatic: no swollen glands  Integument: no lumps, mole changes, nail changes or rash  Endocrine:no malaise/lethargy poly uria or polydipsia or unexpected weight changes        Social History     Socioeconomic History    Marital status:      Spouse name: Not on file    Number of children: Not on file    Years of education: Not on file    Highest education level: Not on file   Tobacco Use    Smoking status: Never Smoker    Smokeless tobacco: Never Used   Substance and Sexual Activity    Alcohol use: Yes     Comment: 3 Drinks per month    Drug use: No    Sexual activity: Not Currently     Family History   Problem Relation Age of Onset    Breast Cancer Paternal Grandmother         over 48    Stroke Mother     Heart Attack Father        OBJECTIVE:     Visit Vitals  /70 (BP 1 Location: Left arm, BP Patient Position: Sitting)   Pulse 72   Temp 98.5 °F (36.9 °C) (Oral)   Resp 18   Ht 4' 10\" (1.473 m)   Wt 115 lb 9.6 oz (52.4 kg)   SpO2 98%   BMI 24.16 kg/m²     CONSTITUTIONAL:   well nourished, appears age appropriate  EYES: sclera anicteric, PERRL, EOMI  ENMT:nares clear, moist mucous membranes, pharynx clear  NECK: supple. Thyroid normal, No JVD or bruits  RESPIRATORY: Chest: clear to ascultation and percussion, normal inspiratory effort. Marked tenderness left parasternal region from her fall. Decreased inspiratory effort secondary to pain. CARDIOVASCULAR: Heart: regular rate and rhythm no murmurs, rubs or gallops, PMI not displaced, No thrills, no peripheral edema  GASTROINTESTINAL: Abdomen: non distended, soft, non tender, bowel sounds normal  HEMATOLOGIC: no purpura, petechiae or bruising  LYMPHATIC: No lymph node enlargemant  MUSCULOSKELETAL: Extremities: no active synovitis, pulse 1+   INTEGUMENT: No unusual rashes or suspicious skin lesions noted. Nails appear normal.  PERIPHERAL VASCULAR: normal pulses femoral, PT and DP  NEUROLOGIC: non-focal exam, A & O X 3  PSYCHIATRIC:, appropriate affect     ASSESSMENT:   1. Mixed hyperlipidemia    2. Primary osteoarthritis involving multiple joints    3. Age-related osteoporosis without current pathological fracture    4. Stage 3 chronic kidney disease ([facility] [address]    5. Acquired hypothyroidism    6. Depression, major, recurrent, moderate (HCC)    7. Chest pain on breathing    Impression  1. Hyperlipidemia prior lab reviewed and repeat status pending I will adjust if needed. 2.  DJD stable except for the chest wall pain  3. Osteoporosis reviewed prior bone density  4. CKD stage III repeat status pending  5   Hypothyroidism stable last check  6. Depression that seems to be better  7. Chest wall pain post fall left rib x-rays obtained reveal no obvious fracture so I think this is all soft tissue  I will call with lab and make further recommendations or adjustments if necessary. Follow-up in 3 months or sooner if there is a problem.     PLAN:  .  Orders Placed This Encounter    XR RIBS LT W PA CXR MIN 3 V    METABOLIC PANEL, COMPREHENSIVE ([facility])    LIPID PANEL ([facility])    CK ([facility])         ATTENTION:   This medical record was transcribed using an electronic medical records system. Although proofread, it may and can contain electronic and spelling errors. Other human spelling and other errors may be present. Corrections may be executed at a later time. Please feel free to contact us for any clarifications as needed. Follow-up and Dispositions    · Return in about 3 months (around 10/17/2020). No results found for any visits on 07/17/20. Lisbeth Purdy MD    The patient verbalized understanding of the problems and plans as explained.

## 2020-07-17 NOTE — PROGRESS NOTES
Chief Complaint   Patient presents with    Cholesterol Problem     3 month follow up    Depression    Chronic Kidney Disease     Visit Vitals  /70 (BP 1 Location: Left arm, BP Patient Position: Sitting)   Pulse 72   Temp 98.5 °F (36.9 °C) (Oral)   Resp 18   Ht 4' 10\" (1.473 m)   Wt 115 lb 9.6 oz (52.4 kg)   SpO2 98%   BMI 24.16 kg/m²     1. Have you been to the ER, urgent care clinic since your last visit? Hospitalized since your last visit? No    2. Have you seen or consulted any other health care providers outside of the [address] [place] [address] since your last visit? Include any pap smears or colon screening.  No

## 2020-07-17 NOTE — PATIENT INSTRUCTIONS
Musculoskeletal Chest Pain: Care Instructions  Your Care Instructions     Chest pain is not always a sign that something is wrong with your heart or that you have another serious problem. The doctor thinks your chest pain is caused by strained muscles or ligaments, inflamed chest cartilage, or another problem in your chest, rather than by your heart. You may need more tests to find the cause of your chest pain. Follow-up care is a key part of your treatment and safety. Be sure to make and go to all appointments, and call your doctor if you are having problems. It's also a good idea to know your test results and keep a list of the medicines you take. How can you care for yourself at home? · Take pain medicines exactly as directed. ? If the doctor gave you a prescription medicine for pain, take it as prescribed. ? If you are not taking a prescription pain medicine, ask your doctor if you can take an over-the-counter medicine. · Rest and protect the sore area. · Stop, change, or take a break from any activity that may be causing your pain or soreness. · Put ice or a cold pack on the sore area for 10 to 20 minutes at a time. Try to do this every 1 to 2 hours for the next 3 days (when you are awake) or until the swelling goes down. Put a thin cloth between the ice and your skin. · After 2 or 3 days, apply a heating pad set on low or a warm cloth to the area that hurts. Some doctors suggest that you go back and forth between hot and cold. · Do not wrap or tape your ribs for support. This may cause you to take smaller breaths, which could increase your risk of lung problems. · Mentholated creams such as Bengay or Icy Hot may soothe sore muscles. Follow the instructions on the package. · Follow your doctor's instructions for exercising. · Gentle stretching and massage may help you get better faster. Stretch slowly to the point just before pain begins, and hold the stretch for at least 15 to 30 seconds.  Do this 3 or 4 times a day. Stretch just after you have applied heat. · As your pain gets better, slowly return to your normal activities. Any increased pain may be a sign that you need to rest a while longer. When should you call for help? [id number] anytime you think you may need emergency care. For example, call if:  · You have chest pain or pressure. This may occur with:  ? Sweating. ? Shortness of breath. ? Nausea or vomiting. ? Pain that spreads from the chest to the neck, jaw, or one or both shoulders or arms. ? Dizziness or lightheadedness. ? A fast or uneven pulse. After calling 911, chew 1 adult-strength aspirin. Wait for an ambulance. Do not try to drive yourself. · You have sudden chest pain and shortness of breath, or you cough up blood. Call your doctor now or seek immediate medical care if:  · You have any trouble breathing. · Your chest pain gets worse. · Your chest pain occurs consistently with exercise and is relieved by rest.  Watch closely for changes in your health, and be sure to contact your doctor if:  · Your chest pain does not get better after 1 week. Where can you learn more? Go to http://www.hyde.com/  Enter V293 in the search box to learn more about \"Musculoskeletal Chest Pain: Care Instructions. \"  Current as of: June 26, 2019               Content Version: 12.5  © 9469-5017 Healthwise, Incorporated. Care instructions adapted under license by [employer] (which disclaims liability or warranty for this information). If you have questions about a medical condition or this instruction, always ask your healthcare professional. [place] [address] any warranty or liability for your use of this information. Musculoskeletal Chest Pain: Care Instructions  Your Care Instructions     Chest pain is not always a sign that something is wrong with your heart or that you have another serious problem.  The doctor thinks your chest pain is caused by strained muscles or ligaments, inflamed chest cartilage, or another problem in your chest, rather than by your heart. You may need more tests to find the cause of your chest pain. Follow-up care is a key part of your treatment and safety. Be sure to make and go to all appointments, and call your doctor if you are having problems. It's also a good idea to know your test results and keep a list of the medicines you take. How can you care for yourself at home? · Take pain medicines exactly as directed. ? If the doctor gave you a prescription medicine for pain, take it as prescribed. ? If you are not taking a prescription pain medicine, ask your doctor if you can take an over-the-counter medicine. · Rest and protect the sore area. · Stop, change, or take a break from any activity that may be causing your pain or soreness. · Put ice or a cold pack on the sore area for 10 to 20 minutes at a time. Try to do this every 1 to 2 hours for the next 3 days (when you are awake) or until the swelling goes down. Put a thin cloth between the ice and your skin. · After 2 or 3 days, apply a heating pad set on low or a warm cloth to the area that hurts. Some doctors suggest that you go back and forth between hot and cold. · Do not wrap or tape your ribs for support. This may cause you to take smaller breaths, which could increase your risk of lung problems. · Mentholated creams such as Bengay or Icy Hot may soothe sore muscles. Follow the instructions on the package. · Follow your doctor's instructions for exercising. · Gentle stretching and massage may help you get better faster. Stretch slowly to the point just before pain begins, and hold the stretch for at least 15 to 30 seconds. Do this 3 or 4 times a day. Stretch just after you have applied heat. · As your pain gets better, slowly return to your normal activities. Any increased pain may be a sign that you need to rest a while longer.   When should you call for help? [id number] anytime you think you may need emergency care. For example, call if:  · You have chest pain or pressure. This may occur with:  ? Sweating. ? Shortness of breath. ? Nausea or vomiting. ? Pain that spreads from the chest to the neck, jaw, or one or both shoulders or arms. ? Dizziness or lightheadedness. ? A fast or uneven pulse. After calling 911, chew 1 adult-strength aspirin. Wait for an ambulance. Do not try to drive yourself. · You have sudden chest pain and shortness of breath, or you cough up blood. Call your doctor now or seek immediate medical care if:  · You have any trouble breathing. · Your chest pain gets worse. · Your chest pain occurs consistently with exercise and is relieved by rest.  Watch closely for changes in your health, and be sure to contact your doctor if:  · Your chest pain does not get better after 1 week. Where can you learn more? Go to http://www.hyde.com/  Enter V293 in the search box to learn more about \"Musculoskeletal Chest Pain: Care Instructions. \"  Current as of: June 26, 2019               Content Version: 12.5  © 2335-8149 [employer]. Care instructions adapted under license by [employer] (which disclaims liability or warranty for this information). If you have questions about a medical condition or this instruction, always ask your healthcare professional. [place] [address] any warranty or liability for your use of this information. Musculoskeletal Chest Pain: Care Instructions  Your Care Instructions     Chest pain is not always a sign that something is wrong with your heart or that you have another serious problem. The doctor thinks your chest pain is caused by strained muscles or ligaments, inflamed chest cartilage, or another problem in your chest, rather than by your heart. You may need more tests to find the cause of your chest pain.   Follow-up care is a key part of your treatment and safety. Be sure to make and go to all appointments, and call your doctor if you are having problems. It's also a good idea to know your test results and keep a list of the medicines you take. How can you care for yourself at home? · Take pain medicines exactly as directed. ? If the doctor gave you a prescription medicine for pain, take it as prescribed. ? If you are not taking a prescription pain medicine, ask your doctor if you can take an over-the-counter medicine. · Rest and protect the sore area. · Stop, change, or take a break from any activity that may be causing your pain or soreness. · Put ice or a cold pack on the sore area for 10 to 20 minutes at a time. Try to do this every 1 to 2 hours for the next 3 days (when you are awake) or until the swelling goes down. Put a thin cloth between the ice and your skin. · After 2 or 3 days, apply a heating pad set on low or a warm cloth to the area that hurts. Some doctors suggest that you go back and forth between hot and cold. · Do not wrap or tape your ribs for support. This may cause you to take smaller breaths, which could increase your risk of lung problems. · Mentholated creams such as Bengay or Icy Hot may soothe sore muscles. Follow the instructions on the package. · Follow your doctor's instructions for exercising. · Gentle stretching and massage may help you get better faster. Stretch slowly to the point just before pain begins, and hold the stretch for at least 15 to 30 seconds. Do this 3 or 4 times a day. Stretch just after you have applied heat. · As your pain gets better, slowly return to your normal activities. Any increased pain may be a sign that you need to rest a while longer. When should you call for help? [id number] anytime you think you may need emergency care. For example, call if:  · You have chest pain or pressure. This may occur with:  ? Sweating. ? Shortness of breath. ? Nausea or vomiting.   ? Pain that spreads from the chest to the neck, jaw, or one or both shoulders or arms. ? Dizziness or lightheadedness. ? A fast or uneven pulse. After calling 911, chew 1 adult-strength aspirin. Wait for an ambulance. Do not try to drive yourself. · You have sudden chest pain and shortness of breath, or you cough up blood. Call your doctor now or seek immediate medical care if:  · You have any trouble breathing. · Your chest pain gets worse. · Your chest pain occurs consistently with exercise and is relieved by rest.  Watch closely for changes in your health, and be sure to contact your doctor if:  · Your chest pain does not get better after 1 week. Where can you learn more? Go to http://rose-velvet.info/  Enter V293 in the search box to learn more about \"Musculoskeletal Chest Pain: Care Instructions. \"  Current as of: June 26, 2019               Content Version: 12.5  © 9443-2287 [employer]. Care instructions adapted under license by [employer] (which disclaims liability or warranty for this information). If you have questions about a medical condition or this instruction, always ask your healthcare professional. [place] [address] any warranty or liability for your use of this information.

## 2020-10-16 NOTE — PROGRESS NOTES
Complete Physical       HPI:     Shahbaz Haider is a 76y.o. year old female who is here for her annual comprehensive healthcare exam and follow-up of her medical problems include hyperlipidemia, cervical DJD, hypothyroidism, CKD stage III, depression and other medical problems. She is taking the medications and plan to follow her diet remain physically active. She currently denies any chest pain, shortness of breath, palpitations, PND, orthopnea or other cardiac or respiratory complaints. She notes no GI or  complaints. She notes no headaches, dizziness or neurologic complaints. As far as arthritic complaints she is still having some left-sided neck pain that seems to radiate over to the left shoulder and trapezial region but is not associated with exertion. She does have some problems with insomnia and has tried some of her 's Ambien is effective. She has no other complaints on complete review systems. Visit Vitals  /78 (BP 1 Location: Left arm, BP Patient Position: Sitting)   Pulse 78   Temp 98.7 °F (37.1 °C) (Oral)   Resp 17   Ht 4' 10\" (1.473 m)   Wt 116 lb 14.4 oz (53 kg)   SpO2 97%   BMI 24.43 kg/m²       Past Medical History:   Diagnosis Date    Adverse effect of anesthesia     Pt states her daughter had an Anaphylactic reaction to Anesthesia    Arthritis     Back pain     Hypertension     Single episode    Menopause     Neck pain     Psychiatric disorder     Depression, Anxiety    Thyroid disease        Past Surgical History:   Procedure Laterality Date    HX ORTHOPAEDIC      Rotator Cuff Repair, Right    HX ORTHOPAEDIC      Carpal Tunnel Repair, Right       Prior to Admission medications    Medication Sig Start Date End Date Taking? Authorizing Provider   zolpidem (AMBIEN) 10 mg tablet Take 1 Tab by mouth nightly as needed for Sleep.  Max Daily Amount: 10 mg. 10/19/20  Yes Lluvia Gold MD   diclofenac EC (VOLTAREN) 75 mg EC tablet TAKE 1 TABLET BY MOUTH TWICE A DAY 7/13/20  Yes Harpal Negrete MD   sertraline (ZOLOFT) 100 mg tablet Take 1 Tab by mouth daily. 6/8/20  Yes Harpal Negrete MD   atorvastatin (LIPITOR) 20 mg tablet Take 0.5 Tabs by mouth daily. 11/12/19  Yes Harpal Negrete MD   levothyroxine (SYNTHROID) 137 mcg tablet TAKE 137 MCG BY MOUTH DAILY (BEFORE BREAKFAST). 10/30/19  Yes Harpal Negrete MD   multivitamin (ONE A DAY) tablet Take 1 Tab by mouth daily. Yes Provider, Historical   alendronate (FOSAMAX) 70 mg tablet Take 70 mg by mouth.  5/18/17  Yes Provider, Historical        No Known Allergies     Family History   Problem Relation Age of Onset    Breast Cancer Paternal Grandmother         over 48    Stroke Mother     Heart Attack Father        Social History     Socioeconomic History    Marital status:      Spouse name: Not on file    Number of children: Not on file    Years of education: Not on file    Highest education level: Not on file   Occupational History    Not on file   Social Needs    Financial resource strain: Not on file    Food insecurity     Worry: Not on file     Inability: Not on file    Transportation needs     Medical: Not on file     Non-medical: Not on file   Tobacco Use    Smoking status: Never Smoker    Smokeless tobacco: Never Used   Substance and Sexual Activity    Alcohol use: Yes     Comment: 3 Drinks per month    Drug use: No    Sexual activity: Not Currently   Lifestyle    Physical activity     Days per week: Not on file     Minutes per session: Not on file    Stress: Not on file   Relationships    Social connections     Talks on phone: Not on file     Gets together: Not on file     Attends Jew service: Not on file     Active member of club or organization: Not on file     Attends meetings of clubs or organizations: Not on file     Relationship status: Not on file    Intimate partner violence     Fear of current or ex partner: Not on file     Emotionally abused: Not on file Physically abused: Not on file     Forced sexual activity: Not on file   Other Topics Concern    Not on file   Social History Narrative    Not on file        ROS:     Constitutional: She denies fevers, weight loss, sweats. Eyes: No blurred or double vision. ENT: No difficulty with swallowing, taste, speech or smell. NECK: no stiffness swelling or lymph node enlargement. Possible pain that seems to radiate across the left trapezial ridge. This is not exertional in nature. She is noting some numbness and tingling on occasion. Respiratory: No cough wheezing or shortness of breath. Cardiovascular: Denies chest pain, palpitations, unexplained indigestion or syncope. Breast: She has noted no masses or lumps and no discharge or axillary swelling  Gastrointestinal:  No changes in bowel movements, no abdominal pain, no bloating. Genitourinary: No discharge or abnormal bleeding or pain  Extremities: No joint pain, stiffness or swelling. Neurological:  No numbness, tingling, burring paresthesias or loss of motor strength. No syncope, dizziness or frequent headache  Skin:  No recent rashes or mole changes. Psychiatric/Behavioral:  Negative for depression. The patient is not nervous/anxious. Possible insomnia  HEMATOLOGIC: no easy bruising or bleeding gums  Endocrine: no sweats of urinary frequency or excessive thirst      Physical Examination:     Vitals:    10/19/20 1007   BP: 122/78   Pulse: 78   Resp: 17   Temp: 98.7 °F (37.1 °C)   TempSrc: Oral   SpO2: 97%   Weight: 116 lb 14.4 oz (53 kg)   Height: 4' 10\" (1.473 m)   PainSc:   3   PainLoc: Neck        General appearance - alert, well appearing, and in no distress  Mental status - alert, oriented to person, place, and time  HEENT:  Ears - bilateral TM's and external ear canals clear  Eyes - pupillary responses were normal.  Extraocular muscle function intact. Lids and conjunctiva not injected. Fundoscopic exam revealed sharp disc margins. eye movements intact  Pharynx- clear with teeth in good repair. No masses were noted  Neck - supple without thyromegaly or burit. No JVD noted. Tenderness left paraspinal area cervical region across left trapezial ridge  Lungs - clear to auscultation and percussion  Cardiac- normal rate, regular rhythm without murmurs. PMI not displaced. No gallop, rub or click  Breast: deferred to GYN  Abdomen - flat, soft, non-tender without palpable organomegaly or mass. No pulsatile mass was felt, and not bruit was heard. Bowel sounds were active   Female - deferred to GYN  Rectal - deferred to GYN  Extremities -  no clubbing cyanosis or edema  Lymphatics - no palpable lymphadenopathy, no hepatosplenomegaly  Peripheral vascular - Dorsalis pedis and posterior tibial pulses felt without difficulty  Skin - no rash or unusual mole change noted  Neurological - Cranial nerves II-XII grossly intact. Motor strength 5/5. DTR's 2+ and symmetric. Station and gait normal  Back exam - full range of motion, no tenderness, palpable spasm or pain on motion  Musculoskeletal - no joint tenderness, deformity or swelling  Hematologic: no purpura, petechiae or bruising    Assessment/Plan:     1. Annual physical exam    2. Mixed hyperlipidemia    3. Primary osteoarthritis involving multiple joints    4. Stage 3 chronic kidney disease, unspecified whether stage 3a or 3b CKD    5. Age-related osteoporosis without current pathological fracture    6. Acquired hypothyroidism    7. Elevated liver enzymes    8. Depression, major, recurrent, moderate ([address]    9. Needs flu shot    10. Encounter for immunization    11. Cervical radiculopathy due to degenerative joint disease of spine    12. Cervical spinal stenosis    13. Primary insomnia        Impression  1. Annual physical examination is normal except for the problems with the insomnia and the neck discomfort. 2.  Hyperlipidemia prior lab reviewed and repeat status pending I will adjust if needed.   3   DJD that is stable except for the neck  4. CKD stage III repeat status pending  5. Osteoporosis prior bone density reviewed and repeat status as necessary  6. Hypothyroidism repeat status pending  7. Prior elevated liver enzymes repeat status pending  8. Depression that is stable  Flu shot given today. Prevnar 13 given today. 9.  Cervical radiculopathy that has recurred I reviewed her prior MRI showing some moderate spinal stenosis I think this needs to be repeated as her symptoms seem to be increased. 10.  Cervical spinal stenosis as noted prior MRI indicated moderate stenosis  11. Insomnia we will try Ambien  I will call with lab results and make further recommendations or adjustments if necessary. Follow-up in 3 months or sooner should I need to based upon the cervical spine findings or other problems. Orders Placed This Encounter    MRI CERV SPINE WO CONT     Standing Status:   Future     Standing Expiration Date:   11/19/2021    Influenza Vaccine, QUAD, 65 Yrs +  IM  (Fluad 47443 )    Pneumococcal conjugate 13 valent, IM (PREVNAR-13)    CBC WITH AUTOMATED DIFF ([facility])    METABOLIC PANEL, COMPREHENSIVE ([facility])    LIPID PANEL ([facility])    CK ([facility])    T4, FREE ([facility])    TSH 3RD GENERATION ([facility])    URINALYSIS W/O MICRO ([facility])    NH IMMUNIZ ADMIN,1 SINGLE/COMB VAC/TOXOID    zolpidem (AMBIEN) 10 mg tablet     Sig: Take 1 Tab by mouth nightly as needed for Sleep. Max Daily Amount: 10 mg. Dispense:  30 Tab     Refill:  5        No results found for any visits on 10/19/20. I have reviewed the patient's medical history in detail and updated the computerized patient record. We had a prolonged discussion about these complex clinical issues and went over the various important aspects to consider. All questions were answered.      Advised her to call back or return to office if symptoms do not improve, change in nature, or persist.    She was given an after visit summary or informed of [facility] which includes patient instructions, diagnoses, current medications, & vitals. She expressed understanding with the diagnosis and plan.       Homar Pacheco MD

## 2020-10-19 ENCOUNTER — OFFICE VISIT (OUTPATIENT)
Dept: INTERNAL MEDICINE CLINIC | Age: 65
End: 2020-10-19
Payer: COMMERCIAL

## 2020-10-19 VITALS
TEMPERATURE: 98.7 F | WEIGHT: 116.9 LBS | HEIGHT: 58 IN | OXYGEN SATURATION: 97 % | DIASTOLIC BLOOD PRESSURE: 78 MMHG | RESPIRATION RATE: 17 BRPM | HEART RATE: 78 BPM | SYSTOLIC BLOOD PRESSURE: 122 MMHG | BODY MASS INDEX: 24.54 KG/M2

## 2020-10-19 DIAGNOSIS — M81.0 AGE-RELATED OSTEOPOROSIS WITHOUT CURRENT PATHOLOGICAL FRACTURE: ICD-10-CM

## 2020-10-19 DIAGNOSIS — R74.8 ELEVATED LIVER ENZYMES: ICD-10-CM

## 2020-10-19 DIAGNOSIS — M15.9 PRIMARY OSTEOARTHRITIS INVOLVING MULTIPLE JOINTS: ICD-10-CM

## 2020-10-19 DIAGNOSIS — E78.2 MIXED HYPERLIPIDEMIA: ICD-10-CM

## 2020-10-19 DIAGNOSIS — F33.1 DEPRESSION, MAJOR, RECURRENT, MODERATE (HCC): ICD-10-CM

## 2020-10-19 DIAGNOSIS — M47.22 CERVICAL RADICULOPATHY DUE TO DEGENERATIVE JOINT DISEASE OF SPINE: ICD-10-CM

## 2020-10-19 DIAGNOSIS — E03.9 ACQUIRED HYPOTHYROIDISM: ICD-10-CM

## 2020-10-19 DIAGNOSIS — N39.0 URINARY TRACT INFECTION WITHOUT HEMATURIA, SITE UNSPECIFIED: ICD-10-CM

## 2020-10-19 DIAGNOSIS — M48.02 CERVICAL SPINAL STENOSIS: ICD-10-CM

## 2020-10-19 DIAGNOSIS — F51.01 PRIMARY INSOMNIA: ICD-10-CM

## 2020-10-19 DIAGNOSIS — N18.30 STAGE 3 CHRONIC KIDNEY DISEASE, UNSPECIFIED WHETHER STAGE 3A OR 3B CKD (HCC): ICD-10-CM

## 2020-10-19 DIAGNOSIS — Z00.00 ANNUAL PHYSICAL EXAM: Primary | ICD-10-CM

## 2020-10-19 DIAGNOSIS — Z23 ENCOUNTER FOR IMMUNIZATION: ICD-10-CM

## 2020-10-19 DIAGNOSIS — Z23 NEEDS FLU SHOT: ICD-10-CM

## 2020-10-19 LAB
A-G RATIO,AGRAT: 1.5 RATIO
ALBUMIN SERPL-MCNC: 4.6 G/DL (ref 3.9–5.4)
ALP SERPL-CCNC: 69 U/L (ref 38–126)
ALT SERPL-CCNC: 49 U/L (ref 0–35)
ANION GAP SERPL CALC-SCNC: 12 MMOL/L
AST SERPL W P-5'-P-CCNC: 49 U/L (ref 14–36)
BACTERIA,BACTU: ABNORMAL
BILIRUB SERPL-MCNC: 0.7 MG/DL (ref 0.2–1.3)
BILIRUB UR QL: NEGATIVE
BUN SERPL-MCNC: 24 MG/DL (ref 7–17)
BUN/CREATININE RATIO,BUCR: 30 RATIO
CALCIUM SERPL-MCNC: 9 MG/DL (ref 8.4–10.2)
CHLORIDE SERPL-SCNC: 106 MMOL/L (ref 98–107)
CHOL/HDL RATIO,CHHD: 2 RATIO (ref 0–4)
CHOLEST SERPL-MCNC: 186 MG/DL (ref 0–200)
CK SERPL-CCNC: 344 U/L (ref 30–135)
CLARITY: CLEAR
CO2 SERPL-SCNC: 28 MMOL/L (ref 22–32)
COLOR UR: ABNORMAL
CREAT SERPL-MCNC: 0.8 MG/DL (ref 0.7–1.2)
ERYTHROCYTE [DISTWIDTH] IN BLOOD BY AUTOMATED COUNT: 14 %
GLOBULIN,GLOB: 3
GLUCOSE 24H UR-MRATE: NEGATIVE G/(24.H)
GLUCOSE SERPL-MCNC: 105 MG/DL (ref 65–105)
HCT VFR BLD AUTO: 37.2 % (ref 37–51)
HDLC SERPL-MCNC: 102 MG/DL (ref 35–130)
HGB BLD-MCNC: 12.1 G/DL (ref 12–18)
HGB UR QL STRIP: ABNORMAL
KETONES UR QL STRIP.AUTO: NEGATIVE
LDL/HDL RATIO,LDHD: 1 RATIO
LDLC SERPL CALC-MCNC: 64 MG/DL (ref 0–130)
LEUKOCYTE ESTERASE: ABNORMAL
LYMPHOCYTES ABSOLUTE: 2.3 K/UL (ref 0.6–4.1)
LYMPHOCYTES NFR BLD: 40 % (ref 10–58.5)
MCH RBC QN AUTO: 30.9 PG (ref 26–32)
MCHC RBC AUTO-ENTMCNC: 32.5 G/DL (ref 30–36)
MCV RBC AUTO: 95.1 FL (ref 80–97)
MONOCYTES ABS-DIF,2141: 0.6 K/UL (ref 0–1.8)
MONOCYTES NFR BLD: 9.7 % (ref 0.1–24)
NEUTROPHILS # BLD: 50.3 % (ref 37–92)
NEUTROPHILS ABS,2156: 2.9 K/UL (ref 2–7.8)
NITRITE UR QL STRIP.AUTO: NEGATIVE
PH UR STRIP: 6 [PH] (ref 5–7)
PLATELET # BLD AUTO: 249 K/UL (ref 140–440)
POTASSIUM SERPL-SCNC: 4.2 MMOL/L (ref 3.6–5)
PROT SERPL-MCNC: 7.6 G/DL (ref 6.3–8.2)
PROT UR STRIP-MCNC: NEGATIVE MG/DL
RBC # BLD AUTO: 3.91 M/UL (ref 4.2–6.3)
RBC #/AREA URNS HPF: ABNORMAL #/HPF
SODIUM SERPL-SCNC: 146 MMOL/L (ref 137–145)
SP GR UR REFRACTOMETRY: 1.02 (ref 1–1.03)
T4 FREE SERPL-MCNC: 1.37 NG/DL (ref 0.58–2.3)
TRIGL SERPL-MCNC: 101 MG/DL (ref 0–200)
TSH SERPL DL<=0.05 MIU/L-ACNC: 1.82 UIU/ML (ref 0.34–5.6)
UROBILINOGEN UR QL STRIP.AUTO: NEGATIVE
VLDLC SERPL CALC-MCNC: 20 MG/DL
WBC # BLD AUTO: 5.7 K/UL (ref 4.1–10.9)
WBC URNS QL MICRO: ABNORMAL #/HPF

## 2020-10-19 PROCEDURE — 90694 VACC AIIV4 NO PRSRV 0.5ML IM: CPT

## 2020-10-19 PROCEDURE — 80061 LIPID PANEL: CPT | Performed by: INTERNAL MEDICINE

## 2020-10-19 PROCEDURE — 99213 OFFICE O/P EST LOW 20 MIN: CPT | Performed by: INTERNAL MEDICINE

## 2020-10-19 PROCEDURE — 99397 PER PM REEVAL EST PAT 65+ YR: CPT | Performed by: INTERNAL MEDICINE

## 2020-10-19 PROCEDURE — 84439 ASSAY OF FREE THYROXINE: CPT | Performed by: INTERNAL MEDICINE

## 2020-10-19 PROCEDURE — 90471 IMMUNIZATION ADMIN: CPT

## 2020-10-19 PROCEDURE — 82550 ASSAY OF CK (CPK): CPT | Performed by: INTERNAL MEDICINE

## 2020-10-19 PROCEDURE — 90670 PCV13 VACCINE IM: CPT

## 2020-10-19 PROCEDURE — 80053 COMPREHEN METABOLIC PANEL: CPT | Performed by: INTERNAL MEDICINE

## 2020-10-19 PROCEDURE — 85025 COMPLETE CBC W/AUTO DIFF WBC: CPT | Performed by: INTERNAL MEDICINE

## 2020-10-19 PROCEDURE — 81003 URINALYSIS AUTO W/O SCOPE: CPT | Performed by: INTERNAL MEDICINE

## 2020-10-19 PROCEDURE — 84443 ASSAY THYROID STIM HORMONE: CPT | Performed by: INTERNAL MEDICINE

## 2020-10-19 PROCEDURE — 90472 IMMUNIZATION ADMIN EACH ADD: CPT

## 2020-10-19 RX ORDER — LEVOTHYROXINE SODIUM 137 UG/1
TABLET ORAL
Qty: 90 TAB | Refills: 3 | Status: SHIPPED | OUTPATIENT
Start: 2020-10-19 | End: 2021-10-18

## 2020-10-19 RX ORDER — ZOLPIDEM TARTRATE 10 MG/1
10 TABLET ORAL
Qty: 30 TAB | Refills: 5 | Status: SHIPPED | OUTPATIENT
Start: 2020-10-19 | End: 2021-01-19 | Stop reason: ALTCHOICE

## 2020-10-19 NOTE — TELEPHONE ENCOUNTER
RX refill request from the patient/pharmacy. Patient last seen 07- with labs, and next appt. scheduled for 10-  Requested Prescriptions     Pending Prescriptions Disp Refills    levothyroxine (SYNTHROID) 137 mcg tablet [Pharmacy Med Name: LEVOTHYROXINE 137 MCG TABLET] 90 Tab 3     Sig: TAKE 1 TAB BY MOUTH DAILY (BEFORE BREAKFAST).    Zhanna Hussein

## 2020-10-19 NOTE — PROGRESS NOTES
After obtaining written consent and per orders of Dr. Dav Vuong, injection of high dose flu vaccine and prevnar 13 given by Alvin Castillo RN. Order and injection[place]medication [place] [facility] [place] Patient tolerated procedure well. VIS was given to them. No reactions noted.

## 2020-10-19 NOTE — PROGRESS NOTES
Chief Complaint   Patient presents with    Complete Physical     Visit Vitals  /78 (BP 1 Location: Left arm, BP Patient Position: Sitting)   Pulse 78   Temp 98.7 °F (37.1 °C) (Oral)   Resp 17   Ht 4' 10\" (1.473 m)   Wt 116 lb 14.4 oz (53 kg)   SpO2 97%   BMI 24.43 kg/m²     1. Have you been to the ER, urgent care clinic since your last visit? Hospitalized since your last visit? No    2. Have you seen or consulted any other health care providers outside of the [address] [place] [address] since your last visit? Include any pap smears or colon screening.  No

## 2020-10-21 LAB — BACTERIA UR CULT: NORMAL

## 2020-10-22 ENCOUNTER — TRANSCRIBE ORDER (OUTPATIENT)
Dept: SCHEDULING | Age: 65
End: 2020-10-22

## 2020-10-22 DIAGNOSIS — Z12.31 VISIT FOR SCREENING MAMMOGRAM: Primary | ICD-10-CM

## 2020-10-24 ENCOUNTER — HOSPITAL ENCOUNTER (OUTPATIENT)
Dept: MRI IMAGING | Age: 65
Discharge: HOME | End: 2020-10-24
Attending: INTERNAL MEDICINE
Payer: COMMERCIAL

## 2020-10-24 DIAGNOSIS — M47.22 CERVICAL RADICULOPATHY DUE TO DEGENERATIVE JOINT DISEASE OF SPINE: ICD-10-CM

## 2020-10-24 DIAGNOSIS — M48.02 CERVICAL SPINAL STENOSIS: ICD-10-CM

## 2020-10-24 PROCEDURE — 72141 MRI NECK SPINE W/O DYE: CPT

## 2020-10-27 NOTE — PROGRESS NOTES
Spinal and foraminal stenosis I will get her an appointment with the surgeon that operated on her neck before.

## 2020-10-28 DIAGNOSIS — M48.02 FORAMINAL STENOSIS OF CERVICAL REGION: ICD-10-CM

## 2020-10-28 DIAGNOSIS — M48.02 SPINAL STENOSIS IN CERVICAL REGION: Primary | ICD-10-CM

## 2020-10-28 NOTE — PROGRESS NOTES
Spinal and foraminal stenosis I will get her an appointment with the surgeon that operated on her neck before. Referral generated.

## 2020-11-15 PROBLEM — Z00.00 ANNUAL PHYSICAL EXAM: Status: RESOLVED | Noted: 2018-04-18 | Resolved: 2020-11-15

## 2020-12-16 ENCOUNTER — HOSPITAL ENCOUNTER (OUTPATIENT)
Dept: MAMMOGRAPHY | Age: 65
Discharge: HOME | End: 2020-12-16
Attending: INTERNAL MEDICINE
Payer: COMMERCIAL

## 2020-12-16 DIAGNOSIS — Z12.31 VISIT FOR SCREENING MAMMOGRAM: ICD-10-CM

## 2020-12-16 PROCEDURE — 77067 SCR MAMMO BI INCL CAD: CPT

## 2020-12-17 RX ORDER — SERTRALINE HYDROCHLORIDE 100 MG/1
TABLET, FILM COATED ORAL
Qty: 90 TAB | Refills: 3 | Status: SHIPPED | OUTPATIENT
Start: 2020-12-17 | End: 2022-02-22

## 2020-12-17 NOTE — TELEPHONE ENCOUNTER
RX refill request from the patient/pharmacy. Patient last seen 10- with labs, and next appt. scheduled for 01-  Requested Prescriptions     Pending Prescriptions Disp Refills    sertraline (ZOLOFT) 100 mg tablet [Pharmacy Med Name: SERTRALINE  MG TABLET] 90 Tab 3     Sig: TAKE 1 TABLET BY MOUTH EVERY DAY   .

## 2021-01-19 ENCOUNTER — OFFICE VISIT (OUTPATIENT)
Dept: INTERNAL MEDICINE CLINIC | Age: 66
End: 2021-01-19
Payer: COMMERCIAL

## 2021-01-19 VITALS
RESPIRATION RATE: 16 BRPM | TEMPERATURE: 97.7 F | SYSTOLIC BLOOD PRESSURE: 120 MMHG | HEIGHT: 58 IN | HEART RATE: 70 BPM | DIASTOLIC BLOOD PRESSURE: 82 MMHG | BODY MASS INDEX: 24.81 KG/M2 | OXYGEN SATURATION: 99 % | WEIGHT: 118.2 LBS

## 2021-01-19 DIAGNOSIS — N18.30 STAGE 3 CHRONIC KIDNEY DISEASE, UNSPECIFIED WHETHER STAGE 3A OR 3B CKD (HCC): ICD-10-CM

## 2021-01-19 DIAGNOSIS — M81.0 AGE-RELATED OSTEOPOROSIS WITHOUT CURRENT PATHOLOGICAL FRACTURE: ICD-10-CM

## 2021-01-19 DIAGNOSIS — M15.9 PRIMARY OSTEOARTHRITIS INVOLVING MULTIPLE JOINTS: ICD-10-CM

## 2021-01-19 DIAGNOSIS — E78.2 MIXED HYPERLIPIDEMIA: Primary | ICD-10-CM

## 2021-01-19 DIAGNOSIS — G44.049 CHRONIC PAROXYSMAL HEMICRANIA, NOT INTRACTABLE: ICD-10-CM

## 2021-01-19 DIAGNOSIS — F33.1 DEPRESSION, MAJOR, RECURRENT, MODERATE (HCC): ICD-10-CM

## 2021-01-19 PROBLEM — R51.9 HEADACHE: Status: ACTIVE | Noted: 2021-01-19

## 2021-01-19 LAB
A-G RATIO,AGRAT: 1.6 RATIO
ALBUMIN SERPL-MCNC: 4.9 G/DL (ref 3.9–5.4)
ALP SERPL-CCNC: 77 U/L (ref 38–126)
ALT SERPL-CCNC: 36 U/L (ref 0–35)
ANION GAP SERPL CALC-SCNC: 14 MMOL/L
AST SERPL W P-5'-P-CCNC: 43 U/L (ref 14–36)
BILIRUB SERPL-MCNC: 0.6 MG/DL (ref 0.2–1.3)
BUN SERPL-MCNC: 26 MG/DL (ref 7–17)
BUN/CREATININE RATIO,BUCR: 29 RATIO
CALCIUM SERPL-MCNC: 10.2 MG/DL (ref 8.4–10.2)
CHLORIDE SERPL-SCNC: 100 MMOL/L (ref 98–107)
CHOL/HDL RATIO,CHHD: 2 RATIO (ref 0–4)
CHOLEST SERPL-MCNC: 220 MG/DL (ref 0–200)
CK SERPL-CCNC: 148 U/L (ref 30–135)
CO2 SERPL-SCNC: 29 MMOL/L (ref 22–32)
CREAT SERPL-MCNC: 0.9 MG/DL (ref 0.7–1.2)
GLOBULIN,GLOB: 3
GLUCOSE SERPL-MCNC: 99 MG/DL (ref 65–105)
HDLC SERPL-MCNC: 108 MG/DL (ref 35–130)
LDL/HDL RATIO,LDHD: 1 RATIO
LDLC SERPL CALC-MCNC: 93 MG/DL (ref 0–130)
POTASSIUM SERPL-SCNC: 4.7 MMOL/L (ref 3.6–5)
PROT SERPL-MCNC: 7.9 G/DL (ref 6.3–8.2)
SODIUM SERPL-SCNC: 143 MMOL/L (ref 137–145)
TRIGL SERPL-MCNC: 97 MG/DL (ref 0–200)
VLDLC SERPL CALC-MCNC: 19 MG/DL

## 2021-01-19 PROCEDURE — 82550 ASSAY OF CK (CPK): CPT | Performed by: INTERNAL MEDICINE

## 2021-01-19 PROCEDURE — 80053 COMPREHEN METABOLIC PANEL: CPT | Performed by: INTERNAL MEDICINE

## 2021-01-19 PROCEDURE — 80061 LIPID PANEL: CPT | Performed by: INTERNAL MEDICINE

## 2021-01-19 PROCEDURE — 99214 OFFICE O/P EST MOD 30 MIN: CPT | Performed by: INTERNAL MEDICINE

## 2021-01-19 NOTE — PATIENT INSTRUCTIONS
Neck Pain: Care Instructions Your Care Instructions You can have neck pain anywhere from the bottom of your head to the top of your shoulders. It can spread to the upper back or arms. Injuries, painting a ceiling, sleeping with your neck twisted, staying in one position for too long, and many other activities can cause neck pain. Most neck pain gets better with home care. Your doctor may recommend medicine to relieve pain or relax your muscles. He or she may suggest exercise and physical therapy to increase flexibility and relieve stress. You may need to wear a special (cervical) collar to support your neck for a day or two. Follow-up care is a key part of your treatment and safety. Be sure to make and go to all appointments, and call your doctor if you are having problems. It's also a good idea to know your test results and keep a list of the medicines you take. How can you care for yourself at home? · Try using a heating pad on a low or medium setting for 15 to 20 minutes every 2 or 3 hours. Try a warm shower in place of one session with the heating pad. · You can also try an ice pack for 10 to 15 minutes every 2 to 3 hours. Put a thin cloth between the ice and your skin. · Take pain medicines exactly as directed. ? If the doctor gave you a prescription medicine for pain, take it as prescribed. ? If you are not taking a prescription pain medicine, ask your doctor if you can take an over-the-counter medicine. · If your doctor recommends a cervical collar, wear it exactly as directed. When should you call for help? Call your doctor now or seek immediate medical care if: 
  · You have new or worsening numbness in your arms, buttocks or legs.  
  · You have new or worsening weakness in your arms or legs. (This could make it hard to stand up.)  
  · You lose control of your bladder or bowels. Watch closely for changes in your health, and be sure to contact your doctor if:   · Your neck pain is getting worse.  
  · You are not getting better after 1 week.  
  · You do not get better as expected. Where can you learn more? Go to http://www.gray.com/ Enter [id number] in the search box to learn more about \"Neck Pain: Care Instructions. \" Current as of: March 2, 2020               Content Version: 12.6 © 2784-9579 [employer], [employer]. Care instructions adapted under license by [employer] (which disclaims liability or warranty for this information). If you have questions about a medical condition or this instruction, always ask your healthcare professional. [address] any warranty or liability for your use of this information.

## 2021-01-19 NOTE — PROGRESS NOTES
Chief Complaint   Patient presents with    Cholesterol Problem     3 month f/up    Thyroid Problem    Depression    Head Pain     frequent headaches    Neck Pain     continues with PT 2 x per week     1. Have you been to the ER, urgent care clinic since your last visit? Hospitalized since your last visit? [facility] [id number] [facility]    2. Have you seen or consulted any other health care providers outside of the [address] [place] [address] since your last visit? Include any pap smears or colon screening.  No

## 2021-01-19 NOTE — PROGRESS NOTES
Chief Complaint   Patient presents with    Cholesterol Problem     3 month f/up    Thyroid Problem    Depression    Head Pain     frequent headaches    Neck Pain     continues with PT 2 x per week       SUBJECTIVE:    Manuelito Nicole is a 76 y.o. female who returns today in follow-up of her medical problems to include hyper lipidemia, CKD stage III, DJD with cervical radiculopathy, hypothyroidism, insomnia and other multiple medical problems. She is noting some increasing pain in her head with headaches on a daily basis and feels like she is more of a spacey person with some difficulty focusing her eyes at times particular when watching TV. This has been present over the past couple weeks and getting worse. She notes that the headache is a persistent either posterior or frontal type headache. She notes no double or blurred vision just difficulty focusing. She feels like to some flashing going out at times. She denies any chest pain, shortness of breath, palpitations, PND, orthopnea or other cardiac or respiratory complaints. She notes no current GI or  complaints. She notes no headaches, dizziness or neurologic complaints except for the headaches and difficulty focusing. There are no current active arthritic complaints other than that related to the neck which she has been seen by neurosurgery and referred for physical therapy which has not helped. Current Outpatient Medications   Medication Sig Dispense Refill    sertraline (ZOLOFT) 100 mg tablet TAKE 1 TABLET BY MOUTH EVERY DAY 90 Tab 3    levothyroxine (SYNTHROID) 137 mcg tablet TAKE 1 TAB BY MOUTH DAILY (BEFORE BREAKFAST). 90 Tab 3    diclofenac EC (VOLTAREN) 75 mg EC tablet TAKE 1 TABLET BY MOUTH TWICE A  Tab 3    atorvastatin (LIPITOR) 20 mg tablet Take 0.5 Tabs by mouth daily. 90 Tab 1    multivitamin (ONE A DAY) tablet Take 1 Tab by mouth daily.        Past Medical History:   Diagnosis Date    Adverse effect of anesthesia Pt states her daughter had an Anaphylactic reaction to Anesthesia    Arthritis     Back pain     Hypertension     Single episode    Menopause     Neck pain     Psychiatric disorder     Depression, Anxiety    Thyroid disease      Past Surgical History:   Procedure Laterality Date    HX ORTHOPAEDIC      Rotator Cuff Repair, Right    HX ORTHOPAEDIC      Carpal Tunnel Repair, Right     No Known Allergies    REVIEW OF SYSTEMS:  General: negative for - chills or fever, or weight loss or gain  ENT: negative for - headaches, nasal congestion or tinnitus  Eyes: no blurred or visual changes  Neck: No stiffness or swollen nodes  Respiratory: negative for - cough, hemoptysis, shortness of breath or wheezing  Cardiovascular : negative for - chest pain, edema, palpitations or shortness of breath  Gastrointestinal: negative for - abdominal pain, blood in stools, heartburn or nausea/vomiting  Genito-Urinary: no dysuria, trouble voiding, or hematuria  Musculoskeletal: negative for - gait disturbance, joint pain, joint stiffness or joint swelling  Neurological: no TIA or stroke symptoms.   Positive for headaches with difficulty focusing  Hematologic: no bruises, no bleeding  Lymphatic: no swollen glands  Integument: no lumps, mole changes, nail changes or rash  Endocrine:no malaise/lethargy poly uria or polydipsia or unexpected weight changes        Social History     Socioeconomic History    Marital status:      Spouse name: Not on file    Number of children: Not on file    Years of education: Not on file    Highest education level: Not on file   Tobacco Use    Smoking status: Never Smoker    Smokeless tobacco: Never Used   Substance and Sexual Activity    Alcohol use: Yes     Frequency: Monthly or less     Drinks per session: 1 or 2     Comment: 3 Drinks per month    Drug use: No    Sexual activity: Not Currently     Family History   Problem Relation Age of Onset    Breast Cancer Paternal Grandmother over 48    Stroke Mother     Heart Attack Father        OBJECTIVE:     Visit Vitals  /82 (BP 1 Location: Right arm, BP Patient Position: Sitting)   Pulse 70   Temp 97.7 °F (36.5 °C)   Resp 16   Ht 4' 10\" (1.473 m)   Wt 118 lb 3.2 oz (53.6 kg)   SpO2 99%   BMI 24.70 kg/m²     CONSTITUTIONAL:   well nourished, appears age appropriate  EYES: sclera anicteric, PERRL, EOMI  ENMT:nares clear, moist mucous membranes, pharynx clear  NECK: supple. Thyroid normal, No JVD or bruits  RESPIRATORY: Chest: clear to ascultation and percussion, normal inspiratory effort  CARDIOVASCULAR: Heart: regular rate and rhythm no murmurs, rubs or gallops, PMI not displaced, No thrills, no peripheral edema  GASTROINTESTINAL: Abdomen: non distended, soft, non tender, bowel sounds normal  HEMATOLOGIC: no purpura, petechiae or bruising  LYMPHATIC: No lymph node enlargemant  MUSCULOSKELETAL: Extremities: no active synovitis, pulse 1+   INTEGUMENT: No unusual rashes or suspicious skin lesions noted. Nails appear normal.  PERIPHERAL VASCULAR: normal pulses femoral, PT and DP  NEUROLOGIC: non-focal exam, A & O X 3  PSYCHIATRIC:, appropriate affect     ASSESSMENT:   1. Mixed hyperlipidemia    2. Primary osteoarthritis involving multiple joints    3. Age-related osteoporosis without current pathological fracture    4. Stage 3 chronic kidney disease, unspecified whether stage 3a or 3b CKD    5. Depression, major, recurrent, moderate ([address]    6. Chronic paroxysmal hemicrania, not intractable      Impression  1. Hyperlipidemia prior lab reviewed and repeat status pending I will adjust if needed. 2.  DJD stable except for the neck I will ask to get back in touch with neurosurgery  3. Osteoporosis reviewed prior bone density and stable  4. CKD stage III repeat status pending  5   Depression that is stable  6.   Headache now with difficulty focusing I think this warrants further evaluation so I am scheduling MRI  Tentative set up some follow-up for 3 months but I may need to see her sooner based upon results of the MRI or lab studies or symptoms.  I will call with lab results and MRI results.    PLAN:  .  Orders Placed This Encounter   • MRI BRAIN W WO CONT   • METABOLIC PANEL, COMPREHENSIVE ([facility])   • LIPID PANEL ([facility])   • CK ([facility])         ATTENTION:   This medical record was transcribed using an electronic medical records system.  Although proofread, it may and can contain electronic and spelling errors.  Other human spelling and other errors may be present.  Corrections may be executed at a later time.  Please feel free to contact us for any clarifications as needed.      Follow-up and Dispositions    · Return in about 3 months (around 4/19/2021).         No results found for any visits on 01/19/21.    Roberto Mann MD    The patient verbalized understanding of the problems and plans as explained.

## 2021-01-29 ENCOUNTER — HOSPITAL ENCOUNTER (OUTPATIENT)
Dept: MRI IMAGING | Age: 66
Discharge: HOME | End: 2021-01-29
Attending: INTERNAL MEDICINE
Payer: COMMERCIAL

## 2021-01-29 DIAGNOSIS — G44.049 CHRONIC PAROXYSMAL HEMICRANIA, NOT INTRACTABLE: ICD-10-CM

## 2021-01-29 PROCEDURE — 74011250636 HC RX REV CODE- 250/636: Performed by: INTERNAL MEDICINE

## 2021-01-29 PROCEDURE — A9575 INJ GADOTERATE MEGLUMI 0.1ML: HCPCS | Performed by: INTERNAL MEDICINE

## 2021-01-29 PROCEDURE — 70553 MRI BRAIN STEM W/O & W/DYE: CPT

## 2021-01-29 RX ORDER — ATORVASTATIN CALCIUM 10 MG/1
TABLET, FILM COATED ORAL
Qty: 90 TAB | Refills: 3 | Status: SHIPPED | OUTPATIENT
Start: 2021-01-29 | End: 2022-03-09

## 2021-01-29 RX ORDER — GADOTERATE MEGLUMINE 376.9 MG/ML
10 INJECTION INTRAVENOUS
Status: COMPLETED | OUTPATIENT
Start: 2021-01-29 | End: 2021-01-29

## 2021-01-29 RX ADMIN — GADOTERATE MEGLUMINE 10 ML: 376.9 INJECTION INTRAVENOUS at 12:01

## 2021-01-29 NOTE — TELEPHONE ENCOUNTER
RX refill request from the patient/pharmacy. Patient last seen 01- with labs, and next appt. scheduled for 04-  Requested Prescriptions     Pending Prescriptions Disp Refills    atorvastatin (LIPITOR) 10 mg tablet [Pharmacy Med Name: ATORVASTATIN 10 MG TABLET] 90 Tab 3     Sig: TAKE 1 TABLET BY MOUTH EVERY DAY   .

## 2021-04-20 NOTE — PROGRESS NOTES
Chief Complaint   Patient presents with    Thyroid Problem     3 month follow up       SUBJECTIVE:    Leigha Alvarez is a 76 y.o. female who returns in follow-up for medical problems include hyperlipidemia, DJD, hypothyroidism, CKD stage III, depression and other medical problems. She is noted she continues with some neck pain. She has been back to see Dr. Rob Knowles since her surgery and he told it was not no surgical need to be done. He was set up for physical therapy that has not helped. She seen the chiropractor and had some dry needling which really did not seem to help. She notes that the pain seems to be on the left side of the neck and seems to radiate over to the left shoulder. It does hurt to turn her neck. She notes no falls or recent trauma. She has no chest pain, shortness of breath, palpitations, PND, orthopnea or other cardiac or respiratory complaints. She notes no GI or  complaints. She has no headaches, dizziness or neurologic complaints. She has no arthritic complaints on the right related to the neck. Remainder review of systems is negative. But positive for left-sided neck pain as noted above      Current Outpatient Medications   Medication Sig Dispense Refill    atorvastatin (LIPITOR) 10 mg tablet TAKE 1 TABLET BY MOUTH EVERY DAY 90 Tab 3    sertraline (ZOLOFT) 100 mg tablet TAKE 1 TABLET BY MOUTH EVERY DAY 90 Tab 3    levothyroxine (SYNTHROID) 137 mcg tablet TAKE 1 TAB BY MOUTH DAILY (BEFORE BREAKFAST). 90 Tab 3    diclofenac EC (VOLTAREN) 75 mg EC tablet TAKE 1 TABLET BY MOUTH TWICE A  Tab 3    multivitamin (ONE A DAY) tablet Take 1 Tab by mouth daily.        Past Medical History:   Diagnosis Date    Adverse effect of anesthesia     Pt states her daughter had an Anaphylactic reaction to Anesthesia    Arthritis     Back pain     Hypertension     Single episode    Menopause     Neck pain     Psychiatric disorder     Depression, Anxiety    Thyroid disease Past Surgical History:   Procedure Laterality Date    HX ORTHOPAEDIC      Rotator Cuff Repair, Right    HX ORTHOPAEDIC      Carpal Tunnel Repair, Right     No Known Allergies    REVIEW OF SYSTEMS:  General: negative for - chills or fever, or weight loss or gain  ENT: negative for - headaches, nasal congestion or tinnitus  Eyes: no blurred or visual changes  Neck: No stiffness or swollen nodes but positive for left-sided neck pain as noted above  Respiratory: negative for - cough, hemoptysis, shortness of breath or wheezing  Cardiovascular : negative for - chest pain, edema, palpitations or shortness of breath  Gastrointestinal: negative for - abdominal pain, blood in stools, heartburn or nausea/vomiting  Genito-Urinary: no dysuria, trouble voiding, or hematuria  Musculoskeletal: negative for - gait disturbance, joint pain, joint stiffness or joint swelling  Neurological: no TIA or stroke symptoms  Hematologic: no bruises, no bleeding  Lymphatic: no swollen glands  Integument: no lumps, mole changes, nail changes or rash  Endocrine:no malaise/lethargy poly uria or polydipsia or unexpected weight changes        Social History     Socioeconomic History    Marital status:      Spouse name: Not on file    Number of children: Not on file    Years of education: Not on file    Highest education level: Not on file   Tobacco Use    Smoking status: Never Smoker    Smokeless tobacco: Never Used   Substance and Sexual Activity    Alcohol use: Yes     Frequency: Monthly or less     Drinks per session: 1 or 2     Comment: 3 Drinks per month    Drug use: No    Sexual activity: Not Currently     Family History   Problem Relation Age of Onset    Breast Cancer Paternal Grandmother         over 48    Stroke Mother     Heart Attack Father        OBJECTIVE:     Visit Vitals  /70 (BP 1 Location: Left arm, BP Patient Position: Sitting, BP Cuff Size: Adult)   Pulse 77   Temp 98 °F (36.7 °C) (Temporal)   Resp 16   Ht 4' 10\" (1.473 m)   Wt 118 lb (53.5 kg)   SpO2 99%   BMI 24.66 kg/m²     CONSTITUTIONAL:   well nourished, appears age appropriate  EYES: sclera anicteric, PERRL, EOMI  ENMT:nares clear, moist mucous membranes, pharynx clear  NECK: supple with some tenderness to rotary motion and tenderness to palpation on the left lower cervical region posteriorly. Thyroid normal, No JVD or bruits  RESPIRATORY: Chest: clear to ascultation and percussion, normal inspiratory effort  CARDIOVASCULAR: Heart: regular rate and rhythm no murmurs, rubs or gallops, PMI not displaced, No thrills, no peripheral edema  GASTROINTESTINAL: Abdomen: non distended, soft, non tender, bowel sounds normal  HEMATOLOGIC: no purpura, petechiae or bruising  LYMPHATIC: No lymph node enlargemant  MUSCULOSKELETAL: Extremities: no active synovitis, pulse 1+   INTEGUMENT: No unusual rashes or suspicious skin lesions noted. Nails appear normal.  PERIPHERAL VASCULAR: normal pulses femoral, PT and DP  NEUROLOGIC: non-focal exam, A & O X 3  PSYCHIATRIC:, appropriate affect     ASSESSMENT:   1. Mixed hyperlipidemia    2. Primary osteoarthritis involving multiple joints    3. Stage 3 chronic kidney disease, unspecified whether stage 3a or 3b CKD (HCC)    4. Age-related osteoporosis without current pathological fracture    5. Acquired hypothyroidism    6. Neck pain      Impression  1. Hyperlipidemia prior lab reviewed and repeat status pending I will adjust if needed. 2.  DJD that is stable  3. CKD stage III repeat status pending  4. Osteoporosis stable on last check  5. Hypothyroidism stable on last check  6. Neck pain I think it is worth getting back to be evaluated by neurosurgery and see if an epidural cortisone shot may be beneficial.  I will recheck her myself in 3 months or sooner should there be a problem. I will call with lab results in the interim.     PLAN:  .  Orders Placed This Encounter    LIPID PANEL    CK    METABOLIC PANEL, COMPREHENSIVE         ATTENTION:   This medical record was transcribed using an electronic medical records system. Although proofread, it may and can contain electronic and spelling errors. Other human spelling and other errors may be present. Corrections may be executed at a later time. Please feel free to contact us for any clarifications as needed. Follow-up and Dispositions    · Return in about 3 months (around 7/21/2021). No results found for any visits on 04/21/21. Jeison Espitia MD    The patient verbalized understanding of the problems and plans as explained.

## 2021-04-21 ENCOUNTER — OFFICE VISIT (OUTPATIENT)
Dept: INTERNAL MEDICINE CLINIC | Age: 66
End: 2021-04-21
Payer: COMMERCIAL

## 2021-04-21 VITALS
HEART RATE: 77 BPM | RESPIRATION RATE: 16 BRPM | OXYGEN SATURATION: 99 % | HEIGHT: 58 IN | SYSTOLIC BLOOD PRESSURE: 116 MMHG | TEMPERATURE: 98 F | DIASTOLIC BLOOD PRESSURE: 70 MMHG | BODY MASS INDEX: 24.77 KG/M2 | WEIGHT: 118 LBS

## 2021-04-21 DIAGNOSIS — M81.0 AGE-RELATED OSTEOPOROSIS WITHOUT CURRENT PATHOLOGICAL FRACTURE: ICD-10-CM

## 2021-04-21 DIAGNOSIS — N18.30 STAGE 3 CHRONIC KIDNEY DISEASE, UNSPECIFIED WHETHER STAGE 3A OR 3B CKD (HCC): ICD-10-CM

## 2021-04-21 DIAGNOSIS — E78.2 MIXED HYPERLIPIDEMIA: Primary | ICD-10-CM

## 2021-04-21 DIAGNOSIS — E03.9 ACQUIRED HYPOTHYROIDISM: ICD-10-CM

## 2021-04-21 DIAGNOSIS — M54.2 NECK PAIN: ICD-10-CM

## 2021-04-21 DIAGNOSIS — M15.9 PRIMARY OSTEOARTHRITIS INVOLVING MULTIPLE JOINTS: ICD-10-CM

## 2021-04-21 LAB
ALBUMIN SERPL-MCNC: 4.2 G/DL (ref 3.5–5)
ALBUMIN/GLOB SERPL: 1.4 {RATIO} (ref 1.1–2.2)
ALP SERPL-CCNC: 67 U/L (ref 45–117)
ALT SERPL-CCNC: 31 U/L (ref 12–78)
ANION GAP SERPL CALC-SCNC: 7 MMOL/L (ref 5–15)
AST SERPL-CCNC: 25 U/L (ref 15–37)
BILIRUB SERPL-MCNC: 0.4 MG/DL (ref 0.2–1)
BUN SERPL-MCNC: 23 MG/DL (ref 6–20)
BUN/CREAT SERPL: 30 (ref 12–20)
CALCIUM SERPL-MCNC: 9.2 MG/DL (ref 8.5–10.1)
CHLORIDE SERPL-SCNC: 107 MMOL/L (ref 97–108)
CHOLEST SERPL-MCNC: 214 MG/DL
CK SERPL-CCNC: 129 U/L (ref 26–192)
CO2 SERPL-SCNC: 26 MMOL/L (ref 21–32)
CREAT SERPL-MCNC: 0.76 MG/DL (ref 0.55–1.02)
GLOBULIN SER CALC-MCNC: 3 G/DL (ref 2–4)
GLUCOSE SERPL-MCNC: 90 MG/DL (ref 65–100)
HDLC SERPL-MCNC: 88 MG/DL
HDLC SERPL: 2.4 {RATIO} (ref 0–5)
LDLC SERPL CALC-MCNC: 107.8 MG/DL (ref 0–100)
LIPID PROFILE,FLP: ABNORMAL
POTASSIUM SERPL-SCNC: 4 MMOL/L (ref 3.5–5.1)
PROT SERPL-MCNC: 7.2 G/DL (ref 6.4–8.2)
SODIUM SERPL-SCNC: 140 MMOL/L (ref 136–145)
TRIGL SERPL-MCNC: 91 MG/DL (ref ?–150)
VLDLC SERPL CALC-MCNC: 18.2 MG/DL

## 2021-04-21 PROCEDURE — 99214 OFFICE O/P EST MOD 30 MIN: CPT | Performed by: INTERNAL MEDICINE

## 2021-04-21 NOTE — PROGRESS NOTES
Nika Amezquita is a 76 y.o. female     Chief Complaint   Patient presents with    Thyroid Problem     3 month follow up       Visit Vitals  /70 (BP 1 Location: Left arm, BP Patient Position: Sitting, BP Cuff Size: Adult)   Pulse 77   Temp 98 °F (36.7 °C) (Temporal)   Resp 16   Ht 4' 10\" (1.473 m)   Wt 118 lb (53.5 kg)   SpO2 99%   BMI 24.66 kg/m²       Health Maintenance Due   Topic Date Due    COVID-19 Vaccine (1) Never done    DTaP/Tdap/Td series (1 - Tdap) Never done    Shingrix Vaccine Age 50> (1 of 2) Never done    Colorectal Cancer Screening Combo  03/03/2021       1. Have you been to the ER, urgent care clinic since your last visit? Hospitalized since your last visit? No    2. Have you seen or consulted any other health care providers outside of the [address] [place] [address] since your last visit? Include any pap smears or colon screening.  No

## 2021-04-21 NOTE — PATIENT INSTRUCTIONS
Cervical Discectomy: What to Expect at [facility] Your Recovery The cervical discectomy took out damaged tissue from the discs in the neck area of your spine. The surgery took pressure off your nerves. You can expect your neck to feel stiff or sore. This should improve in the weeks after surgery. You may have trouble sitting or standing in one position for very long and may need pain medicine in the weeks after your surgery. It may take up to 8 weeks to get back to your usual activities. How long it takes may depend on what kind of surgery you had. Your doctor may advise you to work with a physical therapist to strengthen the muscles around your neck and back. This care sheet gives you a general idea about how long it will take for you to recover. But each person recovers at a different pace. Follow the steps below to get better as quickly as possible. How can you care for yourself at home? Activity 
  · Rest when you feel tired. Getting enough sleep will help you recover.  
  · Try to walk each day. Start by walking a little more than you did the day before. Bit by bit, increase the amount you walk. Walking boosts blood flow and helps prevent pneumonia and constipation. Walking may also decrease your muscle soreness after surgery.  
  · Avoid lifting anything that would make you strain. This may include grocery bags and milk containers, a heavy briefcase or backpack, cat litter or dog food bags, a vacuum , or a child.  
  · Avoid strenuous activities, such as bicycle riding, jogging, weightlifting, or aerobic exercise, until your doctor says it is okay.  
  · Ask your doctor when you can drive again.  
  · Avoid taking long car trips for 2 to 4 weeks after surgery. Your neck may become tired and painful from sitting too long in one position.  
  · Your time off from work depends on how quickly you feel better and on the type of work you do.  If you work in an office, you likely can go back to work sooner than if you have a job where you are very active. Talk with your doctor about your work needs.  
  · You may have sex as soon as you feel able, but avoid positions that put stress on your neck or cause pain. Diet 
  · You can eat your normal diet. If your stomach is upset, try bland, low-fat foods like plain rice, broiled chicken, toast, and yogurt.  
  · Drink plenty of fluids (unless your doctor tells you not to).  
  · You may notice that your bowel movements are not regular right after your surgery. This is common. Try to avoid constipation and straining with bowel movements. You may want to take a fiber supplement every day. If you have not had a bowel movement after a couple of days, ask your doctor about taking a mild laxative. Medicines 
  · Be safe with medicines. Take pain medicines exactly as directed. ? If the doctor gave you a prescription medicine for pain, take it as prescribed. ? If you are not taking a prescription pain medicine, ask your doctor if you can take an over-the-counter medicine.  
  · If your doctor prescribed antibiotics, take them as directed. Do not stop taking them just because you feel better. You need to take the full course of antibiotics.  
  · If you think your pain medicine is making you sick to your stomach: 
? Take your medicine after meals (unless your doctor has told you not to). ? Ask your doctor for a different pain medicine. Incision care 
  · If you have strips of tape on the cut (incision) the doctor made, leave the tape on for a week or until it falls off.  
  · Wash the area daily with warm, soapy water, and pat it dry. Don't use hydrogen peroxide or alcohol, which can slow healing. You may cover the area with a gauze bandage if it weeps or rubs against clothing. Change the dressing every day.  
  · Keep the area clean and dry. Exercise 
  · Do exercises as instructed by your doctor or physical therapist to improve your strength and flexibility. Other instructions 
  · Follow your doctor's instructions if you are told to wear a brace or collar to support your neck.  
  · To reduce stiffness and help sore muscles, use a warm water bottle, a heating pad set on low, or a warm cloth on your neck. Do not put heat right over the incision. Do not go to sleep with a heating pad on your skin. Follow-up care is a key part of your treatment and safety. Be sure to make and go to all appointments, and call your doctor if you are having problems. It's also a good idea to know your test results and keep a list of the medicines you take. When should you call for help? Call 911 anytime you think you may need emergency care. For example, call if: 
  · You passed out (lost consciousness).  
  · You have chest pain, are short of breath, or cough up blood.  
  · You are unable to move an arm or a leg at all. Call your doctor now or seek immediate medical care if: 
  · You have pain that does not get better after you take pain medicine.  
  · You have loose stitches, or your incision comes open.  
  · Bright red blood has soaked through the bandage over your incision.  
  · You have signs of a blood clot in your leg (called a deep vein thrombosis), such as: 
? Pain in your calf, back of the knee, thigh, or groin. ? Redness or swelling in your leg.  
  · You have signs of infection, such as: 
? Increased pain, swelling, warmth, or redness. ? Red streaks leading from the incision. ? Pus draining from the incision. ? A fever.  
  · You have new or worse symptoms in your arms, legs, chest, belly, or buttocks. Symptoms may include: 
? Numbness or tingling. ? Weakness. ? Pain.  
  · You lose bladder or bowel control. Watch closely for any changes in your health, and be sure to contact your doctor if: 
  · You do not get better as expected. Where can you learn more? Go to http://www.gray.com/ Enter R489 in the search box to learn more about \"Cervical Discectomy: What to Expect at Home. \" Current as of: November 16, 2020               Content Version: 12.8 © 2006-2021 [employer]. Care instructions adapted under license by [employer] (which disclaims liability or warranty for this information). If you have questions about a medical condition or this instruction, always ask your healthcare professional. [address] any warranty or liability for your use of this information.

## 2021-07-17 DIAGNOSIS — M19.90 ARTHRITIS: ICD-10-CM

## 2021-07-19 RX ORDER — DICLOFENAC SODIUM 75 MG/1
TABLET, DELAYED RELEASE ORAL
Qty: 180 TABLET | Refills: 3 | Status: SHIPPED | OUTPATIENT
Start: 2021-07-19

## 2021-07-19 NOTE — TELEPHONE ENCOUNTER
RX refill request from the patient/pharmacy. Patient last seen 04- with labs, and next appt. scheduled for 07-  Requested Prescriptions     Pending Prescriptions Disp Refills    diclofenac EC (VOLTAREN) 75 mg EC tablet [Pharmacy Med Name: DICLOFENAC SOD EC 75 MG TAB] 180 Tablet 3     Sig: TAKE 1 TABLET BY MOUTH TWICE A DAY   .

## 2021-07-26 ENCOUNTER — OFFICE VISIT (OUTPATIENT)
Dept: INTERNAL MEDICINE CLINIC | Age: 66
End: 2021-07-26
Payer: MEDICARE

## 2021-07-26 VITALS
SYSTOLIC BLOOD PRESSURE: 112 MMHG | WEIGHT: 116.9 LBS | RESPIRATION RATE: 18 BRPM | BODY MASS INDEX: 24.54 KG/M2 | OXYGEN SATURATION: 97 % | HEIGHT: 58 IN | HEART RATE: 80 BPM | DIASTOLIC BLOOD PRESSURE: 80 MMHG | TEMPERATURE: 98.4 F

## 2021-07-26 DIAGNOSIS — Z13.39 ALCOHOL SCREENING: ICD-10-CM

## 2021-07-26 DIAGNOSIS — N18.30 STAGE 3 CHRONIC KIDNEY DISEASE, UNSPECIFIED WHETHER STAGE 3A OR 3B CKD (HCC): ICD-10-CM

## 2021-07-26 DIAGNOSIS — Z23 ENCOUNTER FOR IMMUNIZATION: ICD-10-CM

## 2021-07-26 DIAGNOSIS — E78.2 MIXED HYPERLIPIDEMIA: Primary | ICD-10-CM

## 2021-07-26 DIAGNOSIS — M81.0 AGE-RELATED OSTEOPOROSIS WITHOUT CURRENT PATHOLOGICAL FRACTURE: ICD-10-CM

## 2021-07-26 DIAGNOSIS — Z00.00 WELCOME TO MEDICARE PREVENTIVE VISIT: ICD-10-CM

## 2021-07-26 DIAGNOSIS — F51.01 PRIMARY INSOMNIA: ICD-10-CM

## 2021-07-26 DIAGNOSIS — F33.1 DEPRESSION, MAJOR, RECURRENT, MODERATE (HCC): ICD-10-CM

## 2021-07-26 DIAGNOSIS — M48.02 CERVICAL SPINAL STENOSIS: ICD-10-CM

## 2021-07-26 DIAGNOSIS — M15.9 PRIMARY OSTEOARTHRITIS INVOLVING MULTIPLE JOINTS: ICD-10-CM

## 2021-07-26 DIAGNOSIS — Z98.1 S/P CERVICAL SPINAL FUSION: ICD-10-CM

## 2021-07-26 DIAGNOSIS — E03.9 ACQUIRED HYPOTHYROIDISM: ICD-10-CM

## 2021-07-26 LAB
ALBUMIN SERPL-MCNC: 4.6 G/DL (ref 3.5–5)
ALBUMIN/GLOB SERPL: 1.3 {RATIO} (ref 1.1–2.2)
ALP SERPL-CCNC: 69 U/L (ref 45–117)
ALT SERPL-CCNC: 34 U/L (ref 12–78)
ANION GAP SERPL CALC-SCNC: 4 MMOL/L (ref 5–15)
APPEARANCE UR: CLEAR
AST SERPL-CCNC: 29 U/L (ref 15–37)
BACTERIA URNS QL MICRO: NEGATIVE /HPF
BASOPHILS # BLD: 0.1 K/UL (ref 0–0.1)
BASOPHILS NFR BLD: 1 % (ref 0–1)
BILIRUB SERPL-MCNC: 0.4 MG/DL (ref 0.2–1)
BILIRUB UR QL: NEGATIVE
BUN SERPL-MCNC: 20 MG/DL (ref 6–20)
BUN/CREAT SERPL: 25 (ref 12–20)
CALCIUM SERPL-MCNC: 10.5 MG/DL (ref 8.5–10.1)
CHLORIDE SERPL-SCNC: 106 MMOL/L (ref 97–108)
CHOLEST SERPL-MCNC: 237 MG/DL
CK SERPL-CCNC: 152 U/L (ref 26–192)
CO2 SERPL-SCNC: 30 MMOL/L (ref 21–32)
COLOR UR: NORMAL
CREAT SERPL-MCNC: 0.81 MG/DL (ref 0.55–1.02)
DIFFERENTIAL METHOD BLD: NORMAL
EOSINOPHIL # BLD: 0.3 K/UL (ref 0–0.4)
EOSINOPHIL NFR BLD: 5 % (ref 0–7)
EPITH CASTS URNS QL MICRO: NORMAL /LPF
ERYTHROCYTE [DISTWIDTH] IN BLOOD BY AUTOMATED COUNT: 12.8 % (ref 11.5–14.5)
GLOBULIN SER CALC-MCNC: 3.6 G/DL (ref 2–4)
GLUCOSE SERPL-MCNC: 95 MG/DL (ref 65–100)
GLUCOSE UR STRIP.AUTO-MCNC: NEGATIVE MG/DL
HCT VFR BLD AUTO: 40.5 % (ref 35–47)
HDLC SERPL-MCNC: 80 MG/DL
HDLC SERPL: 3 {RATIO} (ref 0–5)
HGB BLD-MCNC: 13 G/DL (ref 11.5–16)
HGB UR QL STRIP: NEGATIVE
IMM GRANULOCYTES # BLD AUTO: 0 K/UL (ref 0–0.04)
IMM GRANULOCYTES NFR BLD AUTO: 0 % (ref 0–0.5)
KETONES UR QL STRIP.AUTO: NEGATIVE MG/DL
LDLC SERPL CALC-MCNC: 139 MG/DL (ref 0–100)
LEUKOCYTE ESTERASE UR QL STRIP.AUTO: NEGATIVE
LYMPHOCYTES # BLD: 1.9 K/UL (ref 0.8–3.5)
LYMPHOCYTES NFR BLD: 36 % (ref 12–49)
MCH RBC QN AUTO: 31 PG (ref 26–34)
MCHC RBC AUTO-ENTMCNC: 32.1 G/DL (ref 30–36.5)
MCV RBC AUTO: 96.4 FL (ref 80–99)
MONOCYTES # BLD: 0.4 K/UL (ref 0–1)
MONOCYTES NFR BLD: 7 % (ref 5–13)
NEUTS SEG # BLD: 2.7 K/UL (ref 1.8–8)
NEUTS SEG NFR BLD: 51 % (ref 32–75)
NITRITE UR QL STRIP.AUTO: NEGATIVE
NRBC # BLD: 0 K/UL (ref 0–0.01)
NRBC BLD-RTO: 0 PER 100 WBC
PH UR STRIP: 6.5 [PH] (ref 5–8)
PLATELET # BLD AUTO: 321 K/UL (ref 150–400)
PMV BLD AUTO: 11 FL (ref 8.9–12.9)
POTASSIUM SERPL-SCNC: 4.8 MMOL/L (ref 3.5–5.1)
PROT SERPL-MCNC: 8.2 G/DL (ref 6.4–8.2)
PROT UR STRIP-MCNC: NEGATIVE MG/DL
RBC # BLD AUTO: 4.2 M/UL (ref 3.8–5.2)
RBC #/AREA URNS HPF: NORMAL /HPF (ref 0–5)
SODIUM SERPL-SCNC: 140 MMOL/L (ref 136–145)
SP GR UR REFRACTOMETRY: 1.02 (ref 1–1.03)
T4 SERPL-MCNC: 12.3 UG/DL (ref 4.8–13.9)
TRIGL SERPL-MCNC: 90 MG/DL (ref ?–150)
TSH SERPL DL<=0.05 MIU/L-ACNC: 1.02 UIU/ML (ref 0.36–3.74)
UA: UC IF INDICATED,UAUC: NORMAL
UROBILINOGEN UR QL STRIP.AUTO: 0.2 EU/DL (ref 0.2–1)
VLDLC SERPL CALC-MCNC: 18 MG/DL
WBC # BLD AUTO: 5.3 K/UL (ref 3.6–11)
WBC URNS QL MICRO: NORMAL /HPF (ref 0–4)

## 2021-07-26 PROCEDURE — G0402 INITIAL PREVENTIVE EXAM: HCPCS | Performed by: INTERNAL MEDICINE

## 2021-07-26 PROCEDURE — G0403 EKG FOR INITIAL PREVENT EXAM: HCPCS | Performed by: INTERNAL MEDICINE

## 2021-07-26 PROCEDURE — 1101F PT FALLS ASSESS-DOCD LE1/YR: CPT | Performed by: INTERNAL MEDICINE

## 2021-07-26 PROCEDURE — G8536 NO DOC ELDER MAL SCRN: HCPCS | Performed by: INTERNAL MEDICINE

## 2021-07-26 PROCEDURE — G0009 ADMIN PNEUMOCOCCAL VACCINE: HCPCS | Performed by: INTERNAL MEDICINE

## 2021-07-26 PROCEDURE — G9717 DOC PT DX DEP/BP F/U NT REQ: HCPCS | Performed by: INTERNAL MEDICINE

## 2021-07-26 PROCEDURE — G8427 DOCREV CUR MEDS BY ELIG CLIN: HCPCS | Performed by: INTERNAL MEDICINE

## 2021-07-26 PROCEDURE — G9899 SCRN MAM PERF RSLTS DOC: HCPCS | Performed by: INTERNAL MEDICINE

## 2021-07-26 PROCEDURE — 3017F COLORECTAL CA SCREEN DOC REV: CPT | Performed by: INTERNAL MEDICINE

## 2021-07-26 PROCEDURE — 90732 PPSV23 VACC 2 YRS+ SUBQ/IM: CPT | Performed by: INTERNAL MEDICINE

## 2021-07-26 PROCEDURE — 99214 OFFICE O/P EST MOD 30 MIN: CPT | Performed by: INTERNAL MEDICINE

## 2021-07-26 PROCEDURE — 1090F PRES/ABSN URINE INCON ASSESS: CPT | Performed by: INTERNAL MEDICINE

## 2021-07-26 PROCEDURE — G8420 CALC BMI NORM PARAMETERS: HCPCS | Performed by: INTERNAL MEDICINE

## 2021-07-26 NOTE — PROGRESS NOTES
PROGRESS NOTES    This is a \"Welcome to [employer]" Initial Preventive Physical Examination (IPPE)    I have reviewed the patient's medical history in detail and updated the computerized patient record. She presents today for a welcome to Medicare initial annual visit and screening questionnaire. She is also here in follow-up of her multiple medical problems include hyperlipidemia, CKD stage III, hypothyroidism, DJD with cervical radiculopathy status post surgery with continued discomfort, depression, and other multiple medical problems. She currently denies any chest pain, shortness of breath, palpitations, PND, orthopnea or other cardiac or respiratory complaints. She notes no current GI or  complaints. She notes no headaches, dizziness or neurologic complaints. She has no arthritic complaints other than those related to her neck when she still has pain. She went to physical therapy and that did not seem to help. She was seen by neurosurgery and they did not think any further surgery was warranted. She has had dry needling with physical therapy with no benefit. She did have some laser treatments by chiropractor that seemed to help but she has not had any of those recently. She is taking Tylenol arthritis along with her diclofenac and using some heat. The remainder of complete review of systems is negative. Depression risk factor summary:      Patient Health Questionnaire (PHQ-9)   Over the last 2 weeks, how often have you been bothered by any of the following problems? · Little interest or pleasure in doing things? · Not at all. [0]  · Feeling down, depressed, or hopeless? · Not at all. [0]  · Trouble falling or staying asleep, or sleeping too much? · Not at all. [0]  · Feeling tired or having little energy? · Not at all. [0]  · Poor appetite or overeating? · Not at all. [0]  · Feeling bad about yourself - or that you are a failure or have let yourself or your family down?    · Not at all. [0]  · Trouble concentrating on things, such as reading the newspaper or watching television? · Not at all. [0]  · Moving or speaking so slowly that other people could have noticed? Or the opposite - being so fidgety or restless that you have been moving around a lot more than usual?  · Not at all. [0]  · Thoughts that you would be better off dead, or of hurting yourself in some way? · Not at all. [0]    Total Score: 0/27  Depression Severity:   0-4 None, 5-9 mild, 10-14 moderate, 15-19 moderately severe, 20-27 severe. Functional Ability and Level of Safety:    Hearing Loss    Hearing is good. Activities of Daily Living   Self-care. Requires assistance with: no ADLs    Fall Risk   No fall risk factors    Abuse Screen   None    Adult Nutrition Screen   No risk factors noted. Review of Systems   ROS:    Constitutional: She denies fevers, weight loss, sweats. Eyes: No blurred or double vision. ENT: No difficulty with swallowing, taste, speech or smell. NECK: no stiffness swelling or lymph node enlargement but continued discomfort and pain as noted above  Respiratory: No cough wheezing or shortness of breath. Cardiovascular: Denies chest pain, palpitations, unexplained indigestion or syncope. Breast: She has noted no masses or lumps and no discharge or axillary swelling  Gastrointestinal:  No changes in bowel movements, no abdominal pain, no bloating. Genitourinary: No discharge or abnormal bleeding or pain  Extremities: No joint pain, stiffness or swelling. Neurological:  No numbness, tingling, burring paresthesias or loss of motor strength. No syncope, dizziness or frequent headache  Skin:  No recent rashes or mole changes. Psychiatric/Behavioral:  Negative for depression. The patient is not nervous/anxious.    HEMATOLOGIC: no easy bruising or bleeding gums  Endocrine: no sweats of urinary frequency or excessive thirst    Physical Exam     Visit Vitals  /80 (BP 1 Location: Left arm, BP Patient Position: Sitting, BP Cuff Size: Adult)   Pulse 80   Temp 98.4 °F (36.9 °C) (Oral)   Resp 18   Ht 4' 10\" (1.473 m)   Wt 116 lb 14.4 oz (53 kg)   SpO2 97%   BMI 24.43 kg/m²      Body mass index is 24.43 kg/m². Visual Acuity:    Visual Acuity Screening    Right eye Left eye Both eyes   Without correction:      With correction: 20/25 20/40 2025       Vitals:    07/26/21 1025   BP: 112/80   Pulse: 80   Resp: 18   Temp: 98.4 °F (36.9 °C)   TempSrc: Oral   SpO2: 97%   Weight: 116 lb 14.4 oz (53 kg)   Height: 4' 10\" (1.473 m)   PainSc:   4   PainLoc: Neck        PHYSICAL EXAM:    General appearance - alert, well appearing, and in no distress  Mental status - alert, oriented to person, place, and time  HEENT:  Ears - bilateral TM's and external ear canals clear  Eyes - pupillary responses were normal.  Extraocular muscle function intact. Lids and conjunctiva not injected. Fundoscopic exam revealed sharp disc margins. eye movements intact  Pharynx- clear with teeth in good repair. No masses were noted  Neck - supple without thyromegaly or burit. No JVD noted  Lungs - clear to auscultation and percussion  Cardiac- normal rate, regular rhythm without murmurs. PMI not displaced. No gallop, rub or click  Breast: deferred to GYN  Abdomen - flat, soft, non-tender without palpable organomegaly or mass. No pulsatile mass was felt, and not bruit was heard. Bowel sounds were active   Female - deferred to GYN  Rectal - deferred to GYN  Extremities -  no clubbing cyanosis or edema  Lymphatics - no palpable lymphadenopathy, no hepatosplenomegaly  Peripheral vascular - Dorsalis pedis and posterior tibial pulses felt without difficulty  Skin - no rash or unusual mole change noted  Neurological - Cranial nerves II-XII grossly intact. Motor strength 5/5. DTR's 2+ and symmetric.   Station and gait normal  Back exam - full range of motion, no tenderness, palpable spasm or pain on motion  Musculoskeletal - no joint tenderness, deformity or swelling  Hematologic: no purpura, petechiae or bruising  EKG: normal EKG, normal sinus rhythm, unchanged from previous tracings. Assessment/Plan:   Education and counseling provided:  Are appropriate based on today's review and evaluation  ASSESSMENT:   1. Mixed hyperlipidemia    2. Primary osteoarthritis involving multiple joints    3. Stage 3 chronic kidney disease, unspecified whether stage 3a or 3b CKD ([facility] [address]    4. Acquired hypothyroidism    5. Age-related osteoporosis without current pathological fracture    6. Depression, major, recurrent, moderate ([facility] [address]    7. Primary insomnia    8. Cervical spinal stenosis    9. S/P cervical spinal fusion    10. Alcohol screening    11. Welcome to Medicare preventive visit    12. Encounter for immunization      Impression  1. Hyperlipidemia repeat status pending and prior lab reviewed not make adjustments if necessary. 2.  DJD that is stable except for the neck discomfort told that the laser treatment seem to help and I would recommend going back to those. She will continue the diclofenac and Tylenol arthritis. 3.  CKD stage III repeat status pending  4. Hypothyroidism repeat status pending  5. Osteoporosis reviewed prior bone density  6. Depression that is stable  7. Insomnia controlled  8. Cervical stenosis status post spinal fusion and still having pain as noted  9. Annual alcohol screening is done and at this point she does drink some alcohol socially. I did caution regarding more than 1 drink per day in females with increased cardiovascular risk and increased risk of liver disease and other GI effects. Immunization updated today would pneumococcal 23  Welcome to Medicare initial preventative visit and screening questionnaire was completed today. The results were reviewed with her and her questions were answered. Lifestyle recommendations modifications discussed and made.   I will call with lab results and make further recommendations or adjustments if necessary. Follow-up in 3 months or sooner if there is a problem. PLAN:  .  Orders Placed This Encounter    Pneumococcal Polysaccharide vaccine, 23-Valent, Adult or Immunocompromised    METABOLIC PANEL, COMPREHENSIVE    LIPID PANEL    CK    CBC WITH AUTOMATED DIFF    TSH 3RD GENERATION    T4 (THYROXINE)    URINALYSIS W/ REFLEX CULTURE    AMB POC EKG ROUTINE W/ 12 LEADS, INTER & REP         ATTENTION:   This medical record was transcribed using an electronic medical records system. Although proofread, it may and can contain electronic and spelling errors. Other human spelling and other errors may be present. Corrections may be executed at a later time. Please feel free to contact us for any clarifications as needed. Follow-up and Dispositions    · Return in about 3 months (around 10/26/2021). Boogie Gay MD.    The patient verbalized an understanding of the problems and plans as explained.

## 2021-07-26 NOTE — PATIENT INSTRUCTIONS
Back Pain: Care Instructions  Your Care Instructions     Back pain has many possible causes. It is often related to problems with muscles and ligaments of the back. It may also be related to problems with the nerves, discs, or bones of the back. Moving, lifting, standing, sitting, or sleeping in an awkward way can strain the back. Sometimes you don't notice the injury until later. Arthritis is another common cause of back pain. Although it may hurt a lot, back pain usually improves on its own within several weeks. Most people recover in 12 weeks or less. Using good home treatment and being careful not to stress your back can help you feel better sooner. Follow-up care is a key part of your treatment and safety. Be sure to make and go to all appointments, and call your doctor if you are having problems. It's also a good idea to know your test results and keep a list of the medicines you take. How can you care for yourself at home? · Sit or lie in positions that are most comfortable and reduce your pain. Try one of these positions when you lie down:  ? Lie on your back with your knees bent and supported by large pillows. ? Lie on the floor with your legs on the seat of a sofa or chair. ? Lie on your side with your knees and hips bent and a pillow between your legs. ? Lie on your stomach if it does not make pain worse. · Do not sit up in bed, and avoid soft couches and twisted positions. Bed rest can help relieve pain at first, but it delays healing. Avoid bed rest after the first day of back pain. · Change positions every 30 minutes. If you must sit for long periods of time, take breaks from sitting. Get up and walk around, or lie in a comfortable position. · Try using a heating pad on a low or medium setting for 15 to 20 minutes every 2 or 3 hours. Try a warm shower in place of one session with the heating pad. · You can also try an ice pack for 10 to 15 minutes every 2 to 3 hours.  Put a thin cloth between the ice pack and your skin. · Take pain medicines exactly as directed. ? If the doctor gave you a prescription medicine for pain, take it as prescribed. ? If you are not taking a prescription pain medicine, ask your doctor if you can take an over-the-counter medicine. · Take short walks several times a day. You can start with 5 to 10 minutes, 3 or 4 times a day, and work up to longer walks. Walk on level surfaces and avoid hills and stairs until your back is better. · Return to work and other activities as soon as you can. Continued rest without activity is usually not good for your back. · To prevent future back pain, do exercises to stretch and strengthen your back and stomach. Learn how to use good posture, safe lifting techniques, and proper body mechanics. When should you call for help? Call your doctor now or seek immediate medical care if:    · You have new or worsening numbness in your legs.     · You have new or worsening weakness in your legs. (This could make it hard to stand up.)     · You lose control of your bladder or bowels. Watch closely for changes in your health, and be sure to contact your doctor if:    · You have a fever, lose weight, or don't feel well.     · You do not get better as expected. Where can you learn more? Go to http://www.hyde.com/  Enter I594 in the search box to learn more about \"Back Pain: Care Instructions. \"  Current as of: November 16, 2020               Content Version: 12.8  © 4512-5294 [employer]. Care instructions adapted under license by [employer] (which disclaims liability or warranty for this information). If you have questions about a medical condition or this instruction, always ask your healthcare professional. [place] [address] any warranty or liability for your use of this information.

## 2021-07-26 NOTE — PROGRESS NOTES
Identified pt with two pt identifiers(name and ). Reviewed record in preparation for visit and have obtained necessary documentation. Chief Complaint   Patient presents with    Cholesterol Problem     3 month follow up    Welcome To Medicare        Vitals:    21 1025   BP: 112/80   Pulse: 80   Resp: 18   Temp: 98.4 °F (36.9 °C)   TempSrc: Oral   SpO2: 97%   Weight: 116 lb 14.4 oz (53 kg)   Height: 4' 10\" (1.473 m)   PainSc:   4   PainLoc: Neck       Health Maintenance Due   Topic    DTaP/Tdap/Td series (1 - Tdap)    Shingrix Vaccine Age 49> (1 of 2)    Colorectal Cancer Screening Combo     Medicare Yearly Exam        Coordination of Care Questionnaire:  :   1) Have you been to an emergency room, urgent care, or hospitalized since your last visit? If yes, where when, and reason for visit? no       2. Have seen or consulted any other health care provider since your last visit? If yes, where when, and reason for visit? [address] [place] [address], bone density study done at that center back in may   Neurosurgeon, dr. Zackary Rubi    Patient is accompanied by self I have received verbal consent from Aliyah Sheets to discuss any/all medical information while they are present in the room.

## 2021-07-26 NOTE — PROGRESS NOTES
Ricky Cagle a 76 y.o. female who is present for routine immunizations. Prior to vaccine administration After obtaining verbal consent and per orders of Dr. Kerri Ross, injection of [id number] in left deltoid given by Perez Schmidt. Risks and adverse reactions were discussed. The patient was provided the VIS and they were given an opportunity to ask questions, all questions addressed. Order and injection/medication verified by second nurse/ma review by Apollo Machado Patient tolerated procedure well. No reactions noted. Patient was advised to seek medical or call the office with any questions or concerns post vaccination. Patient verbalized understanding.  Perez Schmidt

## 2021-08-25 PROBLEM — Z00.00 WELCOME TO MEDICARE PREVENTIVE VISIT: Status: RESOLVED | Noted: 2021-07-26 | Resolved: 2021-08-25

## 2021-10-18 RX ORDER — LEVOTHYROXINE SODIUM 137 UG/1
TABLET ORAL
Qty: 90 TABLET | Refills: 3 | Status: SHIPPED | OUTPATIENT
Start: 2021-10-18 | End: 2022-01-27 | Stop reason: SDUPTHER

## 2021-10-18 NOTE — TELEPHONE ENCOUNTER
PCP: Destinee Ceron MD    Last appt: 7/17/2020  Future Appointments   Date Time Provider Bonnie Jo   10/21/2021  8:20 AM DONG SANDS COVID ROOM 2 [facility] PAT RI OR PRE AS   10/26/2021 10:00 AM Destinee Ceron MD [facility] BS AMB       Last refilled:10/19/20    Requested Prescriptions     Pending Prescriptions Disp Refills    levothyroxine (SYNTHROID) 137 mcg tablet [Pharmacy Med Name: LEVOTHYROXINE 137 MCG TABLET] 90 Tablet 3     Sig: TAKE 1 TAB BY MOUTH DAILY (BEFORE BREAKFAST).

## 2021-10-21 ENCOUNTER — HOSPITAL ENCOUNTER (OUTPATIENT)
Dept: PREADMISSION TESTING | Age: 66
Discharge: HOME | End: 2021-10-21
Payer: MEDICARE

## 2021-10-21 PROCEDURE — U0003 INFECTIOUS AGENT DETECTION BY NUCLEIC ACID (DNA OR RNA); SEVERE ACUTE RESPIRATORY SYNDROME CORONAVIRUS 2 (SARS-COV-2) (CORONAVIRUS DISEASE [COVID-19]), AMPLIFIED PROBE TECHNIQUE, MAKING USE OF HIGH THROUGHPUT TECHNOLOGIES AS DESCRIBED BY CMS-2020-01-R: HCPCS

## 2021-10-22 ENCOUNTER — OFFICE VISIT (OUTPATIENT)
Dept: INTERNAL MEDICINE CLINIC | Age: 66
End: 2021-10-22
Payer: MEDICARE

## 2021-10-22 VITALS
DIASTOLIC BLOOD PRESSURE: 78 MMHG | HEIGHT: 58 IN | BODY MASS INDEX: 24.9 KG/M2 | SYSTOLIC BLOOD PRESSURE: 124 MMHG | HEART RATE: 72 BPM | TEMPERATURE: 98.3 F | WEIGHT: 118.6 LBS | OXYGEN SATURATION: 98 %

## 2021-10-22 DIAGNOSIS — J20.9 ACUTE BRONCHITIS, UNSPECIFIED ORGANISM: Primary | ICD-10-CM

## 2021-10-22 LAB
SARS-COV-2, XPLCVT: NOT DETECTED
SOURCE, COVRS: NORMAL

## 2021-10-22 PROCEDURE — G9899 SCRN MAM PERF RSLTS DOC: HCPCS | Performed by: INTERNAL MEDICINE

## 2021-10-22 PROCEDURE — 1101F PT FALLS ASSESS-DOCD LE1/YR: CPT | Performed by: INTERNAL MEDICINE

## 2021-10-22 PROCEDURE — G8420 CALC BMI NORM PARAMETERS: HCPCS | Performed by: INTERNAL MEDICINE

## 2021-10-22 PROCEDURE — G9717 DOC PT DX DEP/BP F/U NT REQ: HCPCS | Performed by: INTERNAL MEDICINE

## 2021-10-22 PROCEDURE — 1090F PRES/ABSN URINE INCON ASSESS: CPT | Performed by: INTERNAL MEDICINE

## 2021-10-22 PROCEDURE — 99213 OFFICE O/P EST LOW 20 MIN: CPT | Performed by: INTERNAL MEDICINE

## 2021-10-22 PROCEDURE — G8427 DOCREV CUR MEDS BY ELIG CLIN: HCPCS | Performed by: INTERNAL MEDICINE

## 2021-10-22 PROCEDURE — 3017F COLORECTAL CA SCREEN DOC REV: CPT | Performed by: INTERNAL MEDICINE

## 2021-10-22 PROCEDURE — G8536 NO DOC ELDER MAL SCRN: HCPCS | Performed by: INTERNAL MEDICINE

## 2021-10-22 RX ORDER — CEFUROXIME AXETIL 250 MG/1
250 TABLET ORAL 2 TIMES DAILY
Qty: 20 TABLET | Refills: 0 | Status: SHIPPED | OUTPATIENT
Start: 2021-10-22 | End: 2022-01-27 | Stop reason: ALTCHOICE

## 2021-10-22 NOTE — PROGRESS NOTES
Subjective:   Javier Boyd is a 78 y.o. female      Chief Complaint   Patient presents with    Nasal Congestion        History of present illness: She presents complaints of cough chest and head congestion been going on for bilateral couple weeks. She actually went last week to an urgent care center and was treated with doxycycline which did not help except for cough is a little better. She also was Covid tested and was negative. She had another Covid test yesterday in preparation for an endoscopy and that was negative as well. She continues with a cough and congestion. She notes no fevers or chills.     Patient Active Problem List   Diagnosis Code    Bilateral carpal tunnel syndrome G56.03    Cervical radiculopathy due to degenerative joint disease of spine M47.22    Acquired hypothyroidism E03.9    Alcohol screening Z13.39    Age-related osteoporosis without current pathological fracture M81.0    Primary osteoarthritis involving multiple joints M89.49    Stage 3 chronic kidney disease (HCC) N18.30    Mixed hyperlipidemia E78.2    Elevated liver enzymes R74.8    Common bile duct dilation K83.8    Gastroenteritis K52.9    Cervical spinal stenosis M48.02    Degenerative disc disease, cervical M50.30    Herniation of cervical intervertebral disc M50.20    S/P cervical spinal fusion Z98.1    Acute cystitis N30.00    Acute pain of right knee M25.561    Neck pain M54.2    Depression, major, recurrent, moderate (HCC) F33.1    Chest pain on breathing R07.1    Primary insomnia F51.01    Headache R51.9      Past Medical History:   Diagnosis Date    Adverse effect of anesthesia     Pt states her daughter had an Anaphylactic reaction to Anesthesia    Arthritis     Back pain     Hypertension     Single episode    Menopause     Neck pain     Psychiatric disorder     Depression, Anxiety    Thyroid disease       No Known Allergies   Family History   Problem Relation Age of Onset    Breast Cancer Paternal Grandmother         over 48    Stroke Mother     Heart Attack Father     Other Other         anaphylactic reaction to anesthesia      Social History     Socioeconomic History    Marital status:      Spouse name: Not on file    Number of children: Not on file    Years of education: Not on file    Highest education level: Not on file   Occupational History    Not on file   Tobacco Use    Smoking status: Never Smoker    Smokeless tobacco: Never Used   Substance and Sexual Activity    Alcohol use: Yes     Comment: 3 Drinks per month    Drug use: No    Sexual activity: Not Currently   Other Topics Concern    Not on file   Social History Narrative    Not on file     Social Determinants of Health     Financial Resource Strain:     Difficulty of Paying Living Expenses:    Food Insecurity:     Worried About Running Out of Food in the Last Year:     [address] in the Last Year:    Transportation Needs:     Lack of Transportation (Medical):  Lack of Transportation (Non-Medical):    Physical Activity:     Days of Exercise per Week:     Minutes of Exercise per Session:    Stress:     Feeling of Stress :    Social Connections:     Frequency of Communication with Friends and Family:     Frequency of Social Gatherings with Friends and Family:     Attends Bahai Services:     Active Member of Clubs or Organizations:     Attends Club or Organization Meetings:     Marital Status:    Intimate Partner Violence:     Fear of Current or Ex-Partner:     Emotionally Abused:     Physically Abused:     Sexually Abused:      Prior to Admission medications    Medication Sig Start Date End Date Taking? Authorizing Provider   cefUROXime (CEFTIN) 250 mg tablet Take 1 Tablet by mouth two (2) times a day. 10/22/21  Yes Don Fonseca MD   levothyroxine (SYNTHROID) 137 mcg tablet TAKE 1 TAB BY MOUTH DAILY (BEFORE BREAKFAST).  10/18/21  Yes Don Fonseca MD   diclofenac EC (VOLTAREN) 75 mg EC tablet TAKE 1 TABLET BY MOUTH TWICE A DAY 7/19/21  Yes Gen Calle MD   atorvastatin (LIPITOR) 10 mg tablet TAKE 1 TABLET BY MOUTH EVERY DAY 1/29/21  Yes Gen Calle MD   sertraline (ZOLOFT) 100 mg tablet TAKE 1 TABLET BY MOUTH EVERY DAY 12/17/20  Yes Gen Calle MD   multivitamin (ONE A DAY) tablet Take 1 Tab by mouth daily. Yes Provider, Historical        Review of Systems              Constitutional:  She denies fever, weight loss, sweats or fatigue. EYES: No blurred or double vision,               ENT: Possibly had an nasal congestion, no headache or dizziness. No difficulty with               swallowing, taste, speech or smell. Respiratory: Positive for postnasal drainage with cough without wheezing or shortness of breath. No sputum production. Cardiac:  Denies chest pain, palpitations, unexplained indigestion, syncope, edema, PND or orthopnea. GI:  No changes in bowel movements, no abdominal pain, no bloating, anorexia, nausea, vomiting or heartburn. :  No frequency or dysuria. Denies incontinence or sexual dysfunction. Extremities:  No joint pain, stiffness or swelling  Back:.no pain or soreness  Skin:  No recent rashes or mole changes. Neurological:  No numbness, tingling, burning paresthesias or loss of motor strength. No syncope, dizziness, frequent headaches or memory loss. Hematologic:  No easy bruising  Lymphatic: No lymph node enlargement    Objective:     Vitals:    10/22/21 1542   BP: 124/78   Pulse: 72   Temp: 98.3 °F (36.8 °C)   TempSrc: Oral   SpO2: 98%   Weight: 118 lb 9.6 oz (53.8 kg)   Height: 4' 10\" (1.473 m)   PainSc:   0 - No pain       Body mass index is 24.79 kg/m². Physical Examination:              General Appearance:  Well-developed, well-nourished, no acute distress. HEENT:      Ears:  The TMs and ear canals were clear.   Eyes:  The pupillary responses were normal.  Extraocular muscle function intact. Lids and conjunctiva not injected. Funduscopic exam revealed sharp disc margins. Nares: Inflamed and edematous  Pharynx:  Clear with teeth in good repair. No masses were noted. Neck:  Supple without thyromegaly or adenopathy. No JVD noted. No carotid                bruits. Lungs:  Clear to auscultation and percussion. Cardiac:  Regular rate and rhythm without murmur. PMI not displaced. No gallop, rub or click. Abdominal: Soft, non-tender, no hepata-spleenomegally or masses  Extremities:  No clubbing, cyanosis or edema. Skin:  No rash or unusual mole changes noted. Lymph Nodes:  None felt in the cervical, supraclavicular, axillary or inguinal region. Neurological: . DTRs 2+ and symmetric. Station and gait normal.   Hematologic:   No purpura or petechiae        Assessment/Plan:         1. Acute bronchitis, unspecified organism        Impressions/Plan:  Impression  1. URI sinusitis bronchitis we will treat this with Ceftin  Recheck if not resolved. Orders Placed This Encounter    cefUROXime (CEFTIN) 250 mg tablet       Follow-up and Dispositions    · Return At prior scheduled appointment. No results found for any visits on 10/22/21. Jessica Romero MD    The patient was given after the visit summary the patient verbalized an understanding of the plans and problems as explained.

## 2021-10-22 NOTE — PROGRESS NOTES
Chief Complaint   Patient presents with    Nasal Congestion   Patient states having a sore throat, congestion, bad coughing. Patient went to [facility] 10/16/21 was given meds and they are not working  Visit Vitals  /78 (BP 1 Location: Left upper arm, BP Patient Position: Sitting, BP Cuff Size: Adult)   Pulse 72   Temp 98.3 °F (36.8 °C) (Oral)   Ht 4' 10\" (1.473 m)   Wt 118 lb 9.6 oz (53.8 kg)   SpO2 98%   BMI 24.79 kg/m²       1. Have you been to the ER, urgent care clinic since your last visit? Hospitalized since your last visit? No    2. Have you seen or consulted any other health care providers outside of the [address] [place] [address] since your last visit? Include any pap smears or colon screening.  [facility] 10/16/21 for congestion

## 2021-10-26 ENCOUNTER — OFFICE VISIT (OUTPATIENT)
Dept: INTERNAL MEDICINE CLINIC | Age: 66
End: 2021-10-26
Payer: MEDICARE

## 2021-10-26 VITALS
OXYGEN SATURATION: 98 % | HEIGHT: 58 IN | BODY MASS INDEX: 24.35 KG/M2 | TEMPERATURE: 98.3 F | WEIGHT: 116 LBS | HEART RATE: 74 BPM | RESPIRATION RATE: 18 BRPM | DIASTOLIC BLOOD PRESSURE: 76 MMHG | SYSTOLIC BLOOD PRESSURE: 122 MMHG

## 2021-10-26 DIAGNOSIS — R74.8 ELEVATED LIVER ENZYMES: ICD-10-CM

## 2021-10-26 DIAGNOSIS — N18.30 STAGE 3 CHRONIC KIDNEY DISEASE, UNSPECIFIED WHETHER STAGE 3A OR 3B CKD (HCC): ICD-10-CM

## 2021-10-26 DIAGNOSIS — F33.1 DEPRESSION, MAJOR, RECURRENT, MODERATE (HCC): ICD-10-CM

## 2021-10-26 DIAGNOSIS — E78.2 MIXED HYPERLIPIDEMIA: Primary | ICD-10-CM

## 2021-10-26 DIAGNOSIS — M81.0 AGE-RELATED OSTEOPOROSIS WITHOUT CURRENT PATHOLOGICAL FRACTURE: ICD-10-CM

## 2021-10-26 DIAGNOSIS — Z23 NEEDS FLU SHOT: ICD-10-CM

## 2021-10-26 DIAGNOSIS — M15.9 PRIMARY OSTEOARTHRITIS INVOLVING MULTIPLE JOINTS: ICD-10-CM

## 2021-10-26 LAB
ALBUMIN SERPL-MCNC: 4.5 G/DL (ref 3.5–5)
ALBUMIN/GLOB SERPL: 1.3 {RATIO} (ref 1.1–2.2)
ALP SERPL-CCNC: 67 U/L (ref 45–117)
ALT SERPL-CCNC: 40 U/L (ref 12–78)
ANION GAP SERPL CALC-SCNC: 5 MMOL/L (ref 5–15)
AST SERPL-CCNC: 30 U/L (ref 15–37)
BILIRUB SERPL-MCNC: 0.5 MG/DL (ref 0.2–1)
BUN SERPL-MCNC: 26 MG/DL (ref 6–20)
BUN/CREAT SERPL: 25 (ref 12–20)
CALCIUM SERPL-MCNC: 9.8 MG/DL (ref 8.5–10.1)
CHLORIDE SERPL-SCNC: 107 MMOL/L (ref 97–108)
CHOLEST SERPL-MCNC: 227 MG/DL
CK SERPL-CCNC: 188 U/L (ref 26–192)
CO2 SERPL-SCNC: 27 MMOL/L (ref 21–32)
CREAT SERPL-MCNC: 1.04 MG/DL (ref 0.55–1.02)
GLOBULIN SER CALC-MCNC: 3.6 G/DL (ref 2–4)
GLUCOSE SERPL-MCNC: 98 MG/DL (ref 65–100)
HDLC SERPL-MCNC: 97 MG/DL
HDLC SERPL: 2.3 {RATIO} (ref 0–5)
LDLC SERPL CALC-MCNC: 112.4 MG/DL (ref 0–100)
POTASSIUM SERPL-SCNC: 4 MMOL/L (ref 3.5–5.1)
PROT SERPL-MCNC: 8.1 G/DL (ref 6.4–8.2)
SODIUM SERPL-SCNC: 139 MMOL/L (ref 136–145)
TRIGL SERPL-MCNC: 88 MG/DL (ref ?–150)
VLDLC SERPL CALC-MCNC: 17.6 MG/DL

## 2021-10-26 PROCEDURE — G8420 CALC BMI NORM PARAMETERS: HCPCS | Performed by: INTERNAL MEDICINE

## 2021-10-26 PROCEDURE — G8536 NO DOC ELDER MAL SCRN: HCPCS | Performed by: INTERNAL MEDICINE

## 2021-10-26 PROCEDURE — G9717 DOC PT DX DEP/BP F/U NT REQ: HCPCS | Performed by: INTERNAL MEDICINE

## 2021-10-26 PROCEDURE — G9899 SCRN MAM PERF RSLTS DOC: HCPCS | Performed by: INTERNAL MEDICINE

## 2021-10-26 PROCEDURE — 90694 VACC AIIV4 NO PRSRV 0.5ML IM: CPT | Performed by: INTERNAL MEDICINE

## 2021-10-26 PROCEDURE — G0008 ADMIN INFLUENZA VIRUS VAC: HCPCS | Performed by: INTERNAL MEDICINE

## 2021-10-26 PROCEDURE — 3017F COLORECTAL CA SCREEN DOC REV: CPT | Performed by: INTERNAL MEDICINE

## 2021-10-26 PROCEDURE — 1090F PRES/ABSN URINE INCON ASSESS: CPT | Performed by: INTERNAL MEDICINE

## 2021-10-26 PROCEDURE — 99214 OFFICE O/P EST MOD 30 MIN: CPT | Performed by: INTERNAL MEDICINE

## 2021-10-26 PROCEDURE — 1101F PT FALLS ASSESS-DOCD LE1/YR: CPT | Performed by: INTERNAL MEDICINE

## 2021-10-26 PROCEDURE — G8427 DOCREV CUR MEDS BY ELIG CLIN: HCPCS | Performed by: INTERNAL MEDICINE

## 2021-10-26 RX ORDER — DOXYCYCLINE 100 MG/1
100 CAPSULE ORAL DAILY
COMMUNITY
Start: 2021-10-16 | End: 2022-01-27 | Stop reason: ALTCHOICE

## 2021-10-26 NOTE — PROGRESS NOTES
Chief Complaint   Patient presents with    Cholesterol Problem     3 month f/u    Osteoarthritis    Nasal Congestion       SUBJECTIVE:    Ant Muhammad is a 78 y.o. female returns in follow-up for medical problems include hyperlipidemia, hypothyroidism, DJD, CKD stage III, anxiety with depression and recent evaluation for a sinus infection for which she is still on Ceftin. She currently denies any chest pain, shortness of breath, palpitations, PND, orthopnea or other cardiac or respiratory complaints. She notes no GI or  complaints. She has no headaches, dizziness or neurologic complaints. There are no current active arthritic complaints and and no other complaints on complete review of systems. Current Outpatient Medications   Medication Sig Dispense Refill    doxycycline (VIBRAMYCIN) 100 mg capsule Take 100 mg by mouth daily.  cefUROXime (CEFTIN) 250 mg tablet Take 1 Tablet by mouth two (2) times a day. 20 Tablet 0    levothyroxine (SYNTHROID) 137 mcg tablet TAKE 1 TAB BY MOUTH DAILY (BEFORE BREAKFAST). 90 Tablet 3    diclofenac EC (VOLTAREN) 75 mg EC tablet TAKE 1 TABLET BY MOUTH TWICE A  Tablet 3    atorvastatin (LIPITOR) 10 mg tablet TAKE 1 TABLET BY MOUTH EVERY DAY 90 Tab 3    sertraline (ZOLOFT) 100 mg tablet TAKE 1 TABLET BY MOUTH EVERY DAY 90 Tab 3    multivitamin (ONE A DAY) tablet Take 1 Tab by mouth daily.        Past Medical History:   Diagnosis Date    Adverse effect of anesthesia     Pt states her daughter had an Anaphylactic reaction to Anesthesia    Arthritis     Back pain     Hypertension     Single episode    Menopause     Neck pain     Psychiatric disorder     Depression, Anxiety    Thyroid disease      Past Surgical History:   Procedure Laterality Date    HX BACK SURGERY      c4-5 fusion    HX ORTHOPAEDIC      Rotator Cuff Repair, Right    HX ORTHOPAEDIC      Carpal Tunnel Repair, Right     No Known Allergies    REVIEW OF SYSTEMS:  General: negative for - chills or fever, or weight loss or gain  ENT: negative for - headaches, nasal congestion or tinnitus  Eyes: no blurred or visual changes  Neck: No stiffness or swollen nodes  Respiratory: negative for - cough, hemoptysis, shortness of breath or wheezing  Cardiovascular : negative for - chest pain, edema, palpitations or shortness of breath  Gastrointestinal: negative for - abdominal pain, blood in stools, heartburn or nausea/vomiting  Genito-Urinary: no dysuria, trouble voiding, or hematuria  Musculoskeletal: negative for - gait disturbance, joint pain, joint stiffness or joint swelling  Neurological: no TIA or stroke symptoms  Hematologic: no bruises, no bleeding  Lymphatic: no swollen glands  Integument: no lumps, mole changes, nail changes or rash  Endocrine:no malaise/lethargy poly uria or polydipsia or unexpected weight changes        Social History     Socioeconomic History    Marital status:      Spouse name: Not on file    Number of children: Not on file    Years of education: Not on file    Highest education level: Not on file   Tobacco Use    Smoking status: Never Smoker    Smokeless tobacco: Never Used   Substance and Sexual Activity    Alcohol use: Yes     Comment: 3 Drinks per month    Drug use: No    Sexual activity: Not Currently     Social Determinants of Health     Financial Resource Strain:     Difficulty of Paying Living Expenses:    Food Insecurity:     Worried About Running Out of Food in the Last Year:     [address] in the Last Year:    Transportation Needs:     Lack of Transportation (Medical):      Lack of Transportation (Non-Medical):    Physical Activity:     Days of Exercise per Week:     Minutes of Exercise per Session:    Stress:     Feeling of Stress :    Social Connections:     Frequency of Communication with Friends and Family:     Frequency of Social Gatherings with Friends and Family:     Attends Protestant Services:     Active Member of [address] [place] [address] or Organizations:     Attends Club or Organization Meetings:     Marital Status:      Family History   Problem Relation Age of Onset    Breast Cancer Paternal Grandmother         over 48    Stroke Mother     Heart Attack Father     Other Other         anaphylactic reaction to anesthesia       OBJECTIVE:     Visit Vitals  /76   Pulse 74   Temp 98.3 °F (36.8 °C)   Resp 18   Ht 4' 10\" (1.473 m)   Wt 116 lb (52.6 kg)   SpO2 98%   BMI 24.24 kg/m²     CONSTITUTIONAL:   well nourished, appears age appropriate  EYES: sclera anicteric, PERRL, EOMI  ENMT:nares clear, moist mucous membranes, pharynx clear  NECK: supple. Thyroid normal, No JVD or bruits  RESPIRATORY: Chest: clear to ascultation and percussion, normal inspiratory effort  CARDIOVASCULAR: Heart: regular rate and rhythm no murmurs, rubs or gallops, PMI not displaced, No thrills, no peripheral edema  GASTROINTESTINAL: Abdomen: non distended, soft, non tender, bowel sounds normal  HEMATOLOGIC: no purpura, petechiae or bruising  LYMPHATIC: No lymph node enlargemant  MUSCULOSKELETAL: Extremities: no active synovitis, pulse 1+   INTEGUMENT: No unusual rashes or suspicious skin lesions noted. Nails appear normal.  PERIPHERAL VASCULAR: normal pulses femoral, PT and DP  NEUROLOGIC: non-focal exam, A & O X 3  PSYCHIATRIC:, appropriate affect     ASSESSMENT:   1. Mixed hyperlipidemia    2. Primary osteoarthritis involving multiple joints    3. Stage 3 chronic kidney disease, unspecified whether stage 3a or 3b CKD (HCC)    4. Age-related osteoporosis without current pathological fracture    5. Depression, major, recurrent, moderate (HCC)    6. Elevated liver enzymes    7. Needs flu shot      Impression  1. Hyperlipidemia prior lab reviewed repeat status pending I will adjust if needed. 2.  DJD that is chronic but stable  3. CKD stage III repeat status pending  4. Osteoporosis reviewed prior bone density  5. Depression that is stable  6.   Prior elevated liver enzymes repeat status pending  Flu shot given today. I will call the lab and make further recommendations or adjustments if necessary. Follow-up in 3 months or sooner if there is a problem. PLAN:  .  Orders Placed This Encounter    Influenza Vaccine, QUAD, 65 Yrs +  IM  (Fluad 49055 )    METABOLIC PANEL, COMPREHENSIVE    LIPID PANEL    CK    doxycycline (VIBRAMYCIN) 100 mg capsule         ATTENTION:   This medical record was transcribed using an electronic medical records system. Although proofread, it may and can contain electronic and spelling errors. Other human spelling and other errors may be present. Corrections may be executed at a later time. Please feel free to contact us for any clarifications as needed. Follow-up and Dispositions    · Return in 3 months (on 1/26/2022), or if symptoms worsen or fail to improve. No results found for any visits on 10/26/21. Luis Choudhury MD    The patient verbalized understanding of the problems and plans as explained.

## 2021-10-26 NOTE — PROGRESS NOTES
Identified pt with two pt identifiers(name and ). Chief Complaint   Patient presents with    Cholesterol Problem     3 month f/u    Osteoarthritis    Nasal Congestion      Vitals:    10/26/21 1033   BP: 102/66   Pulse: 74   Resp: 18   Temp: 98.3 °F (36.8 °C)   SpO2: 98%   Weight: 116 lb (52.6 kg)   Height: 4' 10\" (1.473 m)   PainSc:   0 - No pain      Health Maintenance Due   Topic    DTaP/Tdap/Td series (1 - Tdap)    Shingrix Vaccine Age 49> (1 of 2)    Colorectal Cancer Screening Combo     Flu Vaccine (1)       Depression Screening:  :     3 most recent PHQ Screens 10/26/2021   Little interest or pleasure in doing things Not at all   Feeling down, depressed, irritable, or hopeless Several days   Total Score PHQ 2 1   Trouble falling or staying asleep, or sleeping too much -   Feeling tired or having little energy -   Poor appetite, weight loss, or overeating -   Feeling bad about yourself - or that you are a failure or have let yourself or your family down -   Trouble concentrating on things such as school, work, reading, or watching TV -   Moving or speaking so slowly that other people could have noticed; or the opposite being so fidgety that others notice -   Thoughts of being better off dead, or hurting yourself in some way -   PHQ 9 Score -   How difficult have these problems made it for you to do your work, take care of your home and get along with others -        Fall Risk Assessment:  :     Fall Risk Assessment, last 12 mths 10/26/2021   Able to walk? Yes   Fall in past 12 months? 0   Do you feel unsteady? 0   Are you worried about falling 0   Number of falls in past 12 months -   Fall with injury? -       Abuse Screening:  :     Abuse Screening Questionnaire 2021   Do you ever feel afraid of your partner? N   Are you in a relationship with someone who physically or mentally threatens you? N   Is it safe for you to go home?  Y        Coordination of Care Questionnaire:  :     1. Have you been to the ER, urgent care clinic since your last visit? Hospitalized since your last visit? No    2. Have you seen or consulted any other health care providers outside of the [address] [place] [address] since your last visit? Include any pap smears or colon screening.  No

## 2021-10-26 NOTE — PROGRESS NOTES
After obtaining written consent and per orders of Dr. Monica Pizarro, injection of flu vaccine given by Harpal Nelson RN. Order and injection/medication verified by Irina Alexander CMT. Patient tolerated procedure well. VIS was given to them. No reactions noted.

## 2021-10-26 NOTE — PATIENT INSTRUCTIONS
High Cholesterol: Care Instructions  Overview     Cholesterol is a type of fat in your blood. It is needed for many body functions, such as making new cells. Cholesterol is made by your body. It also comes from food you eat. High cholesterol means that you have too much of the fat in your blood. This raises your risk of a heart attack and stroke. LDL and HDL are part of your total cholesterol. LDL is the \"bad\" cholesterol. High LDL can raise your risk for coronary artery disease, heart attack, and stroke. HDL is the \"good\" cholesterol. It helps clear bad cholesterol from the body. High HDL is linked with a lower risk of coronary artery disease, heart attack, and stroke. Your cholesterol levels help your doctor find out your risk for having a heart attack or stroke. You and your doctor can talk about whether you need to lower your risk and what treatment is best for you. Treatment options include a heart-healthy lifestyle and medicine. Both options can help lower your cholesterol and your risk. The way you choose to lower your risk will depend on how high your risk is for heart attack and stroke. It will also depend on how you feel about taking medicines. Follow-up care is a key part of your treatment and safety. Be sure to make and go to all appointments, and call your doctor if you are having problems. It's also a good idea to know your test results and keep a list of the medicines you take. How can you care for yourself at home? · Eat heart-healthy foods. ? Eat fruits, vegetables, whole grains, beans, and other high-fiber foods. ? Eat lean proteins, such as seafood, lean meats, beans, nuts, and soy products. ? Eat healthy fats, such as canola and olive oil. ? Choose foods that are low in saturated fat. ? Limit sodium and alcohol. ? Limit drinks and foods with added sugar. · Be physically active. Try to do moderate activity at least 2½ hours a week.  Or try to do vigorous activity at least 1¼ hours a week. You may want to walk or try other activities, such as running, swimming, cycling, or playing tennis or team sports. · Stay at a healthy weight or lose weight by making the changes in eating and physical activity listed above. Losing just a small amount of weight, even 5 to 10 pounds, can help reduce your risk for having a heart attack or stroke. · Do not smoke. · Manage other health problems. These include diabetes and high blood pressure. If you think you may have a problem with alcohol or drug use, talk to your doctor. · If you take medicine, take it exactly as prescribed. Call your doctor if you think you are having a problem with your medicine. · Check with your doctor or pharmacist before you use any other medicines, including over-the-counter medicines. Make sure your doctor knows all of the medicines, vitamins, herbal products, and supplements you take. Taking some medicines together can cause problems. When should you call for help? Watch closely for changes in your health, and be sure to contact your doctor if:    · You need help making lifestyle changes.     · You have questions about your medicine. Where can you learn more? Go to http://www.gray.com/  Enter S063 in the search box to learn more about \"High Cholesterol: Care Instructions. \"  Current as of: April 29, 2021               Content Version: 13.0  © 2006-2021 Healthwise, Incorporated. Care instructions adapted under license by [employer] (which disclaims liability or warranty for this information). If you have questions about a medical condition or this instruction, always ask your healthcare professional. [place] [address] any warranty or liability for your use of this information.

## 2021-10-27 NOTE — PROGRESS NOTES
Increased cholesterol and LDL but good HDL so no change in treatment. Letter generated to patient regarding.

## 2022-01-24 ENCOUNTER — TRANSCRIBE ORDER (OUTPATIENT)
Dept: SCHEDULING | Age: 67
End: 2022-01-24

## 2022-01-24 DIAGNOSIS — Z12.31 SCREENING MAMMOGRAM FOR HIGH-RISK PATIENT: Primary | ICD-10-CM

## 2022-01-26 NOTE — PROGRESS NOTES
Chief Complaint   Patient presents with    Hypothyroidism     3 month follow up    Cholesterol Problem    Depression       SUBJECTIVE:    Chela Felipe is a 78 y.o. female returns in follow-up for medical problems include hyperlipidemia, DJD, CKD stage III, prior elevated liver enzymes, anxiety with depression and other medical problems. She has a little bit of a cough but has nothing that has any color to it. She does note a little nasal congestion and occasional blood when she blows her nose but not on a regular basis. She denies any chest pain, shortness of breath, palpitations, PND, orthopnea or cardiorespiratory complaints other than a little cough and congestion. She notes no fevers or chills. She denies any GI or  complaints. She has no headaches, dizziness or neurologic complaints. There are no current active arthritic complaints and and no other complaints on complete review of systems. Current Outpatient Medications   Medication Sig Dispense Refill    levothyroxine (SYNTHROID) 137 mcg tablet Take 1 Tablet by mouth Daily (before breakfast). 90 Tablet 3    diclofenac EC (VOLTAREN) 75 mg EC tablet TAKE 1 TABLET BY MOUTH TWICE A  Tablet 3    atorvastatin (LIPITOR) 10 mg tablet TAKE 1 TABLET BY MOUTH EVERY DAY 90 Tab 3    sertraline (ZOLOFT) 100 mg tablet TAKE 1 TABLET BY MOUTH EVERY DAY 90 Tab 3    multivitamin (ONE A DAY) tablet Take 1 Tab by mouth daily.        Past Medical History:   Diagnosis Date    Adverse effect of anesthesia     Pt states her daughter had an Anaphylactic reaction to Anesthesia    Arthritis     Back pain     Hypertension     Single episode    Menopause     Neck pain     Psychiatric disorder     Depression, Anxiety    Thyroid disease      Past Surgical History:   Procedure Laterality Date    HX BACK SURGERY      c4-5 fusion    HX ORTHOPAEDIC      Rotator Cuff Repair, Right    HX ORTHOPAEDIC      Carpal Tunnel Repair, Right     No Known Allergies    REVIEW OF SYSTEMS:  General: negative for - chills or fever, or weight loss or gain  ENT: negative for - headaches or tinnitus.   Positive for some head and nasal congestion but always clear except occasional blood out of the right side  Eyes: no blurred or visual changes  Neck: No stiffness or swollen nodes  Respiratory: negative for - cough, hemoptysis, shortness of breath or wheezing  Cardiovascular : negative for - chest pain, edema, palpitations or shortness of breath  Gastrointestinal: negative for - abdominal pain, blood in stools, heartburn or nausea/vomiting  Genito-Urinary: no dysuria, trouble voiding, or hematuria  Musculoskeletal: negative for - gait disturbance, joint pain, joint stiffness or joint swelling  Neurological: no TIA or stroke symptoms  Hematologic: no bruises, no bleeding  Lymphatic: no swollen glands  Integument: no lumps, mole changes, nail changes or rash  Endocrine:no malaise/lethargy poly uria or polydipsia or unexpected weight changes        Social History     Socioeconomic History    Marital status:    Tobacco Use    Smoking status: Never Smoker    Smokeless tobacco: Never Used   Vaping Use    Vaping Use: Never used   Substance and Sexual Activity    Alcohol use: Yes     Comment: 3 Drinks per month    Drug use: No    Sexual activity: Not Currently     Family History   Problem Relation Age of Onset    Breast Cancer Paternal Grandmother         over 48    Stroke Mother     Heart Attack Father     Other Other         anaphylactic reaction to anesthesia       OBJECTIVE:     Visit Vitals  /72 (BP 1 Location: Left upper arm, BP Patient Position: Sitting, BP Cuff Size: Adult)   Pulse 70   Temp 98.1 °F (36.7 °C) (Oral)   Resp 16   Ht 4' 10\" (1.473 m)   Wt 120 lb 3.2 oz (54.5 kg)   SpO2 98%   BMI 25.12 kg/m²     CONSTITUTIONAL:   well nourished, appears age appropriate  EYES: sclera anicteric, PERRL, EOMI  ENMT:nares clear with slight edema and evidence of recent bleed from the right nasal septum, moist mucous membranes, pharynx clear  NECK: supple. Thyroid normal, No JVD or bruits  RESPIRATORY: Chest: clear to ascultation and percussion, normal inspiratory effort  CARDIOVASCULAR: Heart: regular rate and rhythm no murmurs, rubs or gallops, PMI not displaced, No thrills, no peripheral edema  GASTROINTESTINAL: Abdomen: non distended, soft, non tender, bowel sounds normal  HEMATOLOGIC: no purpura, petechiae or bruising  LYMPHATIC: No lymph node enlargemant  MUSCULOSKELETAL: Extremities: no active synovitis, pulse 1+   INTEGUMENT: No unusual rashes or suspicious skin lesions noted. Nails appear normal.  PERIPHERAL VASCULAR: normal pulses femoral, PT and DP  NEUROLOGIC: non-focal exam, A & O X 3  PSYCHIATRIC:, appropriate affect     ASSESSMENT:   1. Mixed hyperlipidemia    2. Primary osteoarthritis involving multiple joints    3. Stage 3 chronic kidney disease, unspecified whether stage 3a or 3b CKD (HCC)    4. Age-related osteoporosis without current pathological fracture    5. Depression, major, recurrent, moderate (HCC)      Impression  1. Hyperlipidemia prior lab reviewed repeat status pending I will adjust if needed. 2.  DJD that is stable  3 CKD stage III repeat status pending  4. Osteoporosis reviewed prior bone density  5. Depression that is controlled  6. Allergic rhinitis I recommend some Claritin or Zyrtec. I do not see current evidence of infection  Follow-up in 3 months or sooner if there is a problem. I will call the lab results in interim. PLAN:  .  Orders Placed This Encounter    METABOLIC PANEL, COMPREHENSIVE    LIPID PANEL    CK    levothyroxine (SYNTHROID) 137 mcg tablet         ATTENTION:   This medical record was transcribed using an electronic medical records system. Although proofread, it may and can contain electronic and spelling errors. Other human spelling and other errors may be present.   Corrections may be executed at a later time.  Please feel free to contact us for any clarifications as needed. Follow-up and Dispositions    · Return in about 3 months (around 4/27/2022). No results found for any visits on 01/27/22. Quyen Sandra MD    The patient verbalized understanding of the problems and plans as explained.

## 2022-01-27 ENCOUNTER — OFFICE VISIT (OUTPATIENT)
Dept: INTERNAL MEDICINE CLINIC | Age: 67
End: 2022-01-27
Payer: MEDICARE

## 2022-01-27 VITALS
OXYGEN SATURATION: 98 % | BODY MASS INDEX: 25.23 KG/M2 | SYSTOLIC BLOOD PRESSURE: 112 MMHG | TEMPERATURE: 98.1 F | RESPIRATION RATE: 16 BRPM | WEIGHT: 120.2 LBS | HEART RATE: 70 BPM | HEIGHT: 58 IN | DIASTOLIC BLOOD PRESSURE: 72 MMHG

## 2022-01-27 DIAGNOSIS — F33.1 DEPRESSION, MAJOR, RECURRENT, MODERATE (HCC): ICD-10-CM

## 2022-01-27 DIAGNOSIS — E78.2 MIXED HYPERLIPIDEMIA: Primary | ICD-10-CM

## 2022-01-27 DIAGNOSIS — M81.0 AGE-RELATED OSTEOPOROSIS WITHOUT CURRENT PATHOLOGICAL FRACTURE: ICD-10-CM

## 2022-01-27 DIAGNOSIS — N18.30 STAGE 3 CHRONIC KIDNEY DISEASE, UNSPECIFIED WHETHER STAGE 3A OR 3B CKD (HCC): ICD-10-CM

## 2022-01-27 DIAGNOSIS — M15.9 PRIMARY OSTEOARTHRITIS INVOLVING MULTIPLE JOINTS: ICD-10-CM

## 2022-01-27 LAB
ALBUMIN SERPL-MCNC: 4.6 G/DL (ref 3.5–5)
ALBUMIN/GLOB SERPL: 1.4 {RATIO} (ref 1.1–2.2)
ALP SERPL-CCNC: 65 U/L (ref 45–117)
ALT SERPL-CCNC: 44 U/L (ref 12–78)
ANION GAP SERPL CALC-SCNC: 4 MMOL/L (ref 5–15)
AST SERPL-CCNC: 34 U/L (ref 15–37)
BILIRUB SERPL-MCNC: 0.4 MG/DL (ref 0.2–1)
BUN SERPL-MCNC: 24 MG/DL (ref 6–20)
BUN/CREAT SERPL: 23 (ref 12–20)
CALCIUM SERPL-MCNC: 9.6 MG/DL (ref 8.5–10.1)
CHLORIDE SERPL-SCNC: 104 MMOL/L (ref 97–108)
CHOLEST SERPL-MCNC: 222 MG/DL
CK SERPL-CCNC: 191 U/L (ref 26–192)
CO2 SERPL-SCNC: 29 MMOL/L (ref 21–32)
CREAT SERPL-MCNC: 1.04 MG/DL (ref 0.55–1.02)
GLOBULIN SER CALC-MCNC: 3.2 G/DL (ref 2–4)
GLUCOSE SERPL-MCNC: 99 MG/DL (ref 65–100)
HDLC SERPL-MCNC: 98 MG/DL
HDLC SERPL: 2.3 {RATIO} (ref 0–5)
LDLC SERPL CALC-MCNC: 111.8 MG/DL (ref 0–100)
POTASSIUM SERPL-SCNC: 4.8 MMOL/L (ref 3.5–5.1)
PROT SERPL-MCNC: 7.8 G/DL (ref 6.4–8.2)
SODIUM SERPL-SCNC: 137 MMOL/L (ref 136–145)
TRIGL SERPL-MCNC: 61 MG/DL (ref ?–150)
VLDLC SERPL CALC-MCNC: 12.2 MG/DL

## 2022-01-27 PROCEDURE — G8419 CALC BMI OUT NRM PARAM NOF/U: HCPCS | Performed by: INTERNAL MEDICINE

## 2022-01-27 PROCEDURE — G8536 NO DOC ELDER MAL SCRN: HCPCS | Performed by: INTERNAL MEDICINE

## 2022-01-27 PROCEDURE — 1101F PT FALLS ASSESS-DOCD LE1/YR: CPT | Performed by: INTERNAL MEDICINE

## 2022-01-27 PROCEDURE — 3017F COLORECTAL CA SCREEN DOC REV: CPT | Performed by: INTERNAL MEDICINE

## 2022-01-27 PROCEDURE — G9899 SCRN MAM PERF RSLTS DOC: HCPCS | Performed by: INTERNAL MEDICINE

## 2022-01-27 PROCEDURE — G8427 DOCREV CUR MEDS BY ELIG CLIN: HCPCS | Performed by: INTERNAL MEDICINE

## 2022-01-27 PROCEDURE — 99214 OFFICE O/P EST MOD 30 MIN: CPT | Performed by: INTERNAL MEDICINE

## 2022-01-27 PROCEDURE — G9717 DOC PT DX DEP/BP F/U NT REQ: HCPCS | Performed by: INTERNAL MEDICINE

## 2022-01-27 PROCEDURE — 1090F PRES/ABSN URINE INCON ASSESS: CPT | Performed by: INTERNAL MEDICINE

## 2022-01-27 RX ORDER — LEVOTHYROXINE SODIUM 137 UG/1
137 TABLET ORAL
Qty: 90 TABLET | Refills: 3 | Status: SHIPPED | OUTPATIENT
Start: 2022-01-27

## 2022-01-27 NOTE — PROGRESS NOTES
Chief Complaint   Patient presents with    Hypothyroidism     3 month follow up    Cholesterol Problem    Depression     Visit Vitals  /72 (BP 1 Location: Left upper arm, BP Patient Position: Sitting, BP Cuff Size: Adult)   Pulse 70   Temp 98.1 °F (36.7 °C) (Oral)   Resp 16   Ht 4' 10\" (1.473 m)   Wt 120 lb 3.2 oz (54.5 kg)   SpO2 98%   BMI 25.12 kg/m²     1. Have you been to the ER, urgent care clinic since your last visit? Hospitalized since your last visit? No    2. Have you seen or consulted any other health care providers outside of the [address] [place] [address] since your last visit? Include any pap smears or colon screening.  No

## 2022-01-27 NOTE — PATIENT INSTRUCTIONS
Learning About How to Have a Healthy Back  What causes back pain? Back pain is often caused by overuse, strain, or injury. For example, people often hurt their backs playing sports or working in the yard, being jolted in a car accident, or lifting something too heavy. Aging plays a part too. Your bones and muscles tend to lose strength as you age, which makes injury more likely. The spongy discs between the bones of the spine (vertebrae) may suffer from wear and tear and no longer provide enough cushion between the bones. A disc that bulges or breaks open (herniated disc) can press on nerves, causing back pain. In some people, back pain is the result of arthritis, broken vertebrae caused by bone loss (osteoporosis), illness, or a spine problem. Although most people have back pain at one time or another, there are steps you can take to make it less likely. How can you have a healthy back? Reduce stress on your back through good posture   Slumping or slouching alone may not cause low back pain. But after the back has been strained or injured, bad posture can make pain worse. · Sleep in a position that maintains your back's normal curves and on a mattress that feels comfortable. Sleep on your side with a pillow between your knees, or sleep on your back with a pillow under your knees. These positions can reduce strain on your back. · Stand and sit up straight. \"Good posture\" generally means your ears, shoulders, and hips are in a straight line. · If you must stand for a long time, put one foot on a stool, ledge, or box. Switch feet every now and then. · Sit in a chair that is low enough to let you place both feet flat on the floor with both knees nearly level with your hips. If your chair or desk is too high, use a footrest to raise your knees. Place a small pillow, a rolled-up towel, or a lumbar roll in the curve of your back if you need extra support.   · Try a kneeling chair, which helps tilt your hips forward. This takes pressure off your lower back. · Try sitting on an exercise ball. It can rock from side to side, which helps keep your back loose. · When driving, keep your knees nearly level with your hips. Sit straight, and drive with both hands on the steering wheel. Your arms should be in a slightly bent position. Reduce stress on your back through careful lifting   · Squat down, bending at the hips and knees only. If you need to, put one knee to the floor and extend your other knee in front of you, bent at a right angle (half kneeling). · Press your chest straight forward. This helps keep your upper back straight while keeping a slight arch in your low back. · Hold the load as close to your body as possible, at the level of your belly button (navel). · Use your feet to change direction, taking small steps. · Lead with your hips as you change direction. Keep your shoulders in line with your hips as you move. · Set down your load carefully, squatting with your knees and hips only. Exercise and stretch your back   · Do some exercise on most days of the week, if your doctor says it is okay. You can walk, run, swim, or cycle. · Stretch your back muscles. Here are a few exercises to try:  ? Lie on your back, and gently pull one bent knee to your chest. Put that foot back on the floor, and then pull the other knee to your chest.  ? Do pelvic tilts. Lie on your back with your knees bent. Tighten your stomach muscles. Pull your belly button (navel) in and up toward your ribs. You should feel like your back is pressing to the floor and your hips and pelvis are slightly lifting off the floor. Hold for 6 seconds while breathing smoothly. ? Sit with your back flat against a wall. · Keep your core muscles strong. The muscles of your back, belly (abdomen), and buttocks support your spine. ? Pull in your belly and imagine pulling your navel toward your spine. Hold this for 6 seconds, then relax.  Remember to keep breathing normally as you tense your muscles. ? Do curl-ups. Always do them with your knees bent. Keep your low back on the floor, and curl your shoulders toward your knees using a smooth, slow motion. Keep your arms folded across your chest. If this bothers your neck, try putting your hands behind your neck (not your head), with your elbows spread apart. ? Lie on your back with your knees bent and your feet flat on the floor. Tighten your belly muscles, and then push with your feet and raise your buttocks up a few inches. Hold this position 6 seconds as you continue to breathe normally, then lower yourself slowly to the floor. Repeat 8 to 12 times. ? If you like group exercise, try Pilates or yoga. These classes have poses that strengthen the core muscles. Lead a healthy lifestyle   · Stay at a healthy weight to avoid strain on your back. · Do not smoke. Smoking increases the risk of osteoporosis, which weakens the spine. If you need help quitting, talk to your doctor about stop-smoking programs and medicines. These can increase your chances of quitting for good. Where can you learn more? Go to http://www.[employer].com/  Enter L315 in the search box to learn more about \"Learning About How to Have a Healthy Back. \"  Current as of: July 1, 2021               Content Version: 13.0  © 2004-7693 Healthwise, Incorporated. Care instructions adapted under license by [employer] (which disclaims liability or warranty for this information). If you have questions about a medical condition or this instruction, always ask your healthcare professional. [place] [address] any warranty or liability for your use of this information.

## 2022-01-31 NOTE — PROGRESS NOTES
Labs are stable still a good HDL so continue current treatment. Letter generated to patient regarding.

## 2022-02-22 RX ORDER — SERTRALINE HYDROCHLORIDE 100 MG/1
TABLET, FILM COATED ORAL
Qty: 90 TABLET | Refills: 3 | Status: SHIPPED | OUTPATIENT
Start: 2022-02-22 | End: 2022-02-23 | Stop reason: SDUPTHER

## 2022-02-22 NOTE — TELEPHONE ENCOUNTER
RX refill request from the patient/pharmacy. Patient last seen 01- with labs, and next appt. scheduled for 04-  Requested Prescriptions     Pending Prescriptions Disp Refills    sertraline (ZOLOFT) 100 mg tablet [Pharmacy Med Name: SERTRALINE  MG TABLET] 90 Tablet 3     Sig: TAKE 1 TABLET BY MOUTH EVERY DAY   .

## 2022-02-23 RX ORDER — SERTRALINE HYDROCHLORIDE 100 MG/1
100 TABLET, FILM COATED ORAL DAILY
Qty: 90 TABLET | Refills: 3 | Status: SHIPPED | OUTPATIENT
Start: 2022-02-23

## 2022-02-23 NOTE — TELEPHONE ENCOUNTER
RX refill request from the patient/pharmacy. Patient last seen 01- with labs, and next appt. scheduled for 04-  Requested Prescriptions     Pending Prescriptions Disp Refills    sertraline (ZOLOFT) 100 mg tablet 90 Tablet 3     Sig: Take 1 Tablet by mouth daily. Rajni Enamorado

## 2022-03-04 ENCOUNTER — HOSPITAL ENCOUNTER (OUTPATIENT)
Dept: MAMMOGRAPHY | Age: 67
Discharge: HOME | End: 2022-03-04
Attending: INTERNAL MEDICINE
Payer: MEDICARE

## 2022-03-04 DIAGNOSIS — Z12.31 SCREENING MAMMOGRAM FOR HIGH-RISK PATIENT: ICD-10-CM

## 2022-03-04 PROCEDURE — 77067 SCR MAMMO BI INCL CAD: CPT

## 2022-03-07 ENCOUNTER — OFFICE VISIT (OUTPATIENT)
Dept: INTERNAL MEDICINE CLINIC | Age: 67
End: 2022-03-07
Payer: MEDICARE

## 2022-03-07 VITALS
HEART RATE: 86 BPM | HEIGHT: 58 IN | SYSTOLIC BLOOD PRESSURE: 120 MMHG | TEMPERATURE: 98.6 F | WEIGHT: 122.3 LBS | DIASTOLIC BLOOD PRESSURE: 70 MMHG | OXYGEN SATURATION: 98 % | RESPIRATION RATE: 16 BRPM | BODY MASS INDEX: 25.67 KG/M2

## 2022-03-07 DIAGNOSIS — M47.22 CERVICAL RADICULOPATHY DUE TO DEGENERATIVE JOINT DISEASE OF SPINE: ICD-10-CM

## 2022-03-07 DIAGNOSIS — M54.50 ACUTE MIDLINE LOW BACK PAIN WITHOUT SCIATICA: Primary | ICD-10-CM

## 2022-03-07 PROCEDURE — 99214 OFFICE O/P EST MOD 30 MIN: CPT | Performed by: INTERNAL MEDICINE

## 2022-03-07 PROCEDURE — G9717 DOC PT DX DEP/BP F/U NT REQ: HCPCS | Performed by: INTERNAL MEDICINE

## 2022-03-07 PROCEDURE — 1090F PRES/ABSN URINE INCON ASSESS: CPT | Performed by: INTERNAL MEDICINE

## 2022-03-07 PROCEDURE — G8419 CALC BMI OUT NRM PARAM NOF/U: HCPCS | Performed by: INTERNAL MEDICINE

## 2022-03-07 PROCEDURE — G8536 NO DOC ELDER MAL SCRN: HCPCS | Performed by: INTERNAL MEDICINE

## 2022-03-07 PROCEDURE — 3017F COLORECTAL CA SCREEN DOC REV: CPT | Performed by: INTERNAL MEDICINE

## 2022-03-07 PROCEDURE — 1101F PT FALLS ASSESS-DOCD LE1/YR: CPT | Performed by: INTERNAL MEDICINE

## 2022-03-07 PROCEDURE — G8427 DOCREV CUR MEDS BY ELIG CLIN: HCPCS | Performed by: INTERNAL MEDICINE

## 2022-03-07 PROCEDURE — G9899 SCRN MAM PERF RSLTS DOC: HCPCS | Performed by: INTERNAL MEDICINE

## 2022-03-07 RX ORDER — PREDNISONE 5 MG/1
TABLET ORAL
Qty: 48 TABLET | Refills: 0 | Status: ON HOLD | OUTPATIENT
Start: 2022-03-07 | End: 2022-03-22

## 2022-03-07 NOTE — PROGRESS NOTES
HIPAA verified by two patient identifiers. Manny Timmons is a 78 y.o. female    Chief Complaint   Patient presents with    Back Pain       Visit Vitals  /70 (BP 1 Location: Left upper arm, BP Patient Position: Sitting, BP Cuff Size: Adult)   Pulse 86   Temp 98.6 °F (37 °C) (Oral)   Resp 16   Ht 4' 10\" (1.473 m)   Wt 122 lb 4.8 oz (55.5 kg)   SpO2 98%   BMI 25.56 kg/m²       Pain Scale: 5/10  Pain Location: Back      Health Maintenance Due   Topic Date Due    DTaP/Tdap/Td series (1 - Tdap) Never done    Shingrix Vaccine Age 50> (1 of 2) Never done    Colorectal Cancer Screening Combo  03/03/2021         Coordination of Care Questionnaire:  :   1) Have you been to an emergency room, urgent care, or hospitalized since your last visit? If yes, where when, and reason for visit? no       2. Have seen or consulted any other health care provider since your last visit? If yes, where when, and reason for visit? NO      Patient is accompanied by self I have received verbal consent from Manny Timmons to discuss any/all medical information while they are present in the room.

## 2022-03-07 NOTE — PROGRESS NOTES
Subjective:   Anni Junior is a 78 y.o. female      Chief Complaint   Patient presents with    Back Pain        History of present illness: She presents today complaints of low back pain as well as some neck pain. The low back pain has been bothering her now for about 3 weeks since she and her  were trying to pull up some carpet in her basement and she was doing a lot of pulling on her knees. The pain has persisted and seems to go to her hips when she tries to walk but does not go down the legs. The neck pain is something that she has had chronically that seem to flare up again now predominantly on the right side particular she is talking on the phone show noted in the right side. She notes no bowel or bladder dysfunction. She does note a burning going across to her right shoulder and neck area. There is no pain on the left side.     Patient Active Problem List   Diagnosis Code    Bilateral carpal tunnel syndrome G56.03    Cervical radiculopathy due to degenerative joint disease of spine M47.22    Acquired hypothyroidism E03.9    Alcohol screening Z13.39    Age-related osteoporosis without current pathological fracture M81.0    Primary osteoarthritis involving multiple joints M89.49    Stage 3 chronic kidney disease (HCC) N18.30    Mixed hyperlipidemia E78.2    Elevated liver enzymes R74.8    Common bile duct dilation K83.8    Gastroenteritis K52.9    Cervical spinal stenosis M48.02    Degenerative disc disease, cervical M50.30    Herniation of cervical intervertebral disc M50.20    S/P cervical spinal fusion Z98.1    Acute cystitis N30.00    Acute pain of right knee M25.561    Neck pain M54.2    Depression, major, recurrent, moderate (HCC) F33.1    Chest pain on breathing R07.1    Primary insomnia F51.01    Headache R51.9    Acute midline low back pain without sciatica M54.50      Past Medical History:   Diagnosis Date    Adverse effect of anesthesia     Pt states her daughter had an Anaphylactic reaction to Anesthesia    Arthritis     Back pain     Hypertension     Single episode    Menopause     Neck pain     Psychiatric disorder     Depression, Anxiety    Thyroid disease       No Known Allergies   Family History   Problem Relation Age of Onset    Breast Cancer Paternal Grandmother         over 48    Stroke Mother     Heart Attack Father     Other Other         anaphylactic reaction to anesthesia      Social History     Socioeconomic History    Marital status:      Spouse name: Not on file    Number of children: Not on file    Years of education: Not on file    Highest education level: Not on file   Occupational History    Not on file   Tobacco Use    Smoking status: Never Smoker    Smokeless tobacco: Never Used   Vaping Use    Vaping Use: Never used   Substance and Sexual Activity    Alcohol use: Yes     Comment: 3 Drinks per month    Drug use: No    Sexual activity: Not Currently   Other Topics Concern    Not on file   Social History Narrative    Not on file     Social Determinants of Health     Financial Resource Strain:     Difficulty of Paying Living Expenses: Not on file   Food Insecurity:     Worried About Running Out of Food in the Last Year: Not on file    [place] of Food in the Last Year: Not on file   Transportation Needs:     Lack of Transportation (Medical): Not on file    Lack of Transportation (Non-Medical):  Not on file   Physical Activity:     Days of Exercise per Week: Not on file    Minutes of Exercise per Session: Not on file   Stress:     Feeling of Stress : Not on file   Social Connections:     Frequency of Communication with Friends and Family: Not on file    Frequency of Social Gatherings with Friends and Family: Not on file    Attends [employer] Services: Not on file    Active Member of Clubs or Organizations: Not on file    Attends Club or Organization Meetings: Not on file    Marital Status: Not on file   Intimate Partner Violence:     Fear of Current or Ex-Partner: Not on file    Emotionally Abused: Not on file    Physically Abused: Not on file    Sexually Abused: Not on file   Housing Stability:     Unable to Pay for Housing in the Last Year: Not on file    Number of [place] in the Last Year: Not on file    Unstable Housing in the Last Year: Not on file     Prior to Admission medications    Medication Sig Start Date End Date Taking? Authorizing Provider   sertraline (ZOLOFT) 100 mg tablet Take 1 Tablet by mouth daily. 2/23/22  Yes Kami Salas MD   levothyroxine (SYNTHROID) 137 mcg tablet Take 1 Tablet by mouth Daily (before breakfast). 1/27/22  Yes Kami Salas MD   diclofenac EC (VOLTAREN) 75 mg EC tablet TAKE 1 TABLET BY MOUTH TWICE A DAY 7/19/21  Yes aKmi Salas MD   atorvastatin (LIPITOR) 10 mg tablet TAKE 1 TABLET BY MOUTH EVERY DAY 1/29/21  Yes Kami Salas MD   multivitamin (ONE A DAY) tablet Take 1 Tab by mouth daily. Yes Provider, Historical        Review of Systems              Constitutional:  She denies fever, weight loss, sweats or fatigue. EYES: No blurred or double vision,               ENT: no nasal congestion, no headache or dizziness. No difficulty with               swallowing, taste, speech or smell. Neck: Pain as described above  Respiratory:  No cough, wheezing or shortness of breath. No sputum production. Cardiac:  Denies chest pain, palpitations, unexplained indigestion, syncope, edema, PND or orthopnea. GI:  No changes in bowel movements, no abdominal pain, no bloating, anorexia, nausea, vomiting or heartburn. :  No frequency or dysuria. Denies incontinence or sexual dysfunction. Extremities:  No joint pain, stiffness or swelling  Back:. Midline low back pain as described above  Skin:  No recent rashes or mole changes. Neurological:  No numbness, tingling, burning paresthesias or loss of motor strength.   No syncope, dizziness, frequent headaches or memory loss. Hematologic:  No easy bruising  Lymphatic: No lymph node enlargement    Objective:     Vitals:    03/07/22 1408   BP: 120/70   Pulse: 86   Resp: 16   Temp: 98.6 °F (37 °C)   TempSrc: Oral   SpO2: 98%   Weight: 122 lb 4.8 oz (55.5 kg)   Height: 4' 10\" (1.473 m)   PainSc:   5   PainLoc: Back       Body mass index is 25.56 kg/m². Physical Examination:              General Appearance:  Well-developed, well-nourished, no acute distress. HEENT:      Ears:  The TMs and ear canals were clear. Eyes:  The pupillary responses were normal.  Extraocular muscle function intact. Lids and conjunctiva not injected. Funduscopic exam revealed sharp disc margins. Nares: Clear w/o edema or erythema  Pharynx:  Clear with teeth in good repair. No masses were noted. Neck:  Supple without thyromegaly or adenopathy but mild discomfort on range of motion. No JVD noted. No carotid                bruits. Lungs:  Clear to auscultation and percussion. Cardiac:  Regular rate and rhythm without murmur. PMI not displaced. No gallop, rub or click. Abdominal: Soft, non-tender, no hepata-spleenomegally or masses  Extremities:  No clubbing, cyanosis or edema. Back: Straight leg raising is negative but there is some paraspinal tenderness in lower lumbar region. Skin:  No rash or unusual mole changes noted. Lymph Nodes:  None felt in the cervical, supraclavicular, axillary or inguinal region. Neurological: . DTRs 2+ and symmetric. Station and gait normal.   Hematologic:   No purpura or petechiae        Assessment/Plan:         1. Acute midline low back pain without sciatica    2. Cervical radiculopathy due to degenerative joint disease of spine        Impressions/Plan:  Impression  1. Low back pain x-ray obtained today reveals some moderate arthritic changes.   2. Cervical radiculopathy x-ray today reveals continued arthritic changes and evidence of previous surgery but no acute process  5. Prednisone 5 mg 12-day Dosepak and she will notify me if these are not effective. Follow-up otherwise as previously scheduled. Orders Placed This Encounter    XR SPINE LUMB 2 OR 3 V    XR SPINE CERV PA LAT ODONT 3 V MAX       Follow-up and Dispositions    · Return At previously scheduled appointment. No results found for any visits on 03/07/22. Valdemar Anderson MD    The patient was given after the visit summary the patient verbalized an understanding of the plans and problems as explained.

## 2022-03-09 RX ORDER — ATORVASTATIN CALCIUM 10 MG/1
TABLET, FILM COATED ORAL
Qty: 90 TABLET | Refills: 3 | Status: SHIPPED | OUTPATIENT
Start: 2022-03-09 | End: 2022-03-25 | Stop reason: SDUPTHER

## 2022-03-09 NOTE — TELEPHONE ENCOUNTER
RX refill request from the patient/pharmacy. Patient last seen 03- with labs, and next appt. scheduled for 04-  Requested Prescriptions     Pending Prescriptions Disp Refills    atorvastatin (LIPITOR) 10 mg tablet [Pharmacy Med Name: ATORVASTATIN 10 MG TABLET] 90 Tablet 3     Sig: TAKE 1 TABLET BY MOUTH EVERY DAY   .

## 2022-03-15 ENCOUNTER — HOSPITAL ENCOUNTER (OUTPATIENT)
Dept: MAMMOGRAPHY | Age: 67
Discharge: HOME | End: 2022-03-15
Attending: INTERNAL MEDICINE
Payer: MEDICARE

## 2022-03-15 ENCOUNTER — HOSPITAL ENCOUNTER (OUTPATIENT)
Dept: ULTRASOUND IMAGING | Age: 67
Discharge: HOME | End: 2022-03-15
Attending: INTERNAL MEDICINE
Payer: MEDICARE

## 2022-03-15 DIAGNOSIS — R92.8 ABNORMAL MAMMOGRAM: ICD-10-CM

## 2022-03-15 PROCEDURE — 77061 BREAST TOMOSYNTHESIS UNI: CPT

## 2022-03-18 ENCOUNTER — HOSPITAL ENCOUNTER (OUTPATIENT)
Dept: PREADMISSION TESTING | Age: 67
Discharge: HOME | End: 2022-03-18
Payer: MEDICARE

## 2022-03-18 PROBLEM — F33.1 DEPRESSION, MAJOR, RECURRENT, MODERATE (HCC): Status: ACTIVE | Noted: 2020-06-17

## 2022-03-18 PROBLEM — N30.00 ACUTE CYSTITIS: Status: ACTIVE | Noted: 2019-02-27

## 2022-03-18 PROBLEM — M25.561 ACUTE PAIN OF RIGHT KNEE: Status: ACTIVE | Noted: 2019-04-18

## 2022-03-18 PROBLEM — M15.0 PRIMARY OSTEOARTHRITIS INVOLVING MULTIPLE JOINTS: Status: ACTIVE | Noted: 2018-04-18

## 2022-03-18 PROBLEM — M15.9 PRIMARY OSTEOARTHRITIS INVOLVING MULTIPLE JOINTS: Status: ACTIVE | Noted: 2018-04-18

## 2022-03-18 PROBLEM — E03.9 ACQUIRED HYPOTHYROIDISM: Status: ACTIVE | Noted: 2018-04-18

## 2022-03-18 PROCEDURE — U0005 INFEC AGEN DETEC AMPLI PROBE: HCPCS

## 2022-03-19 PROBLEM — M81.0 AGE-RELATED OSTEOPOROSIS WITHOUT CURRENT PATHOLOGICAL FRACTURE: Status: ACTIVE | Noted: 2018-04-18

## 2022-03-19 PROBLEM — K52.9 GASTROENTERITIS: Status: ACTIVE | Noted: 2018-07-03

## 2022-03-19 PROBLEM — R07.1 CHEST PAIN ON BREATHING: Status: ACTIVE | Noted: 2020-07-17

## 2022-03-19 PROBLEM — R74.8 ELEVATED LIVER ENZYMES: Status: ACTIVE | Noted: 2018-06-19

## 2022-03-19 PROBLEM — M54.50 ACUTE MIDLINE LOW BACK PAIN WITHOUT SCIATICA: Status: ACTIVE | Noted: 2022-03-07

## 2022-03-19 PROBLEM — M50.30 DEGENERATIVE DISC DISEASE, CERVICAL: Status: ACTIVE | Noted: 2019-01-21

## 2022-03-19 PROBLEM — F51.01 PRIMARY INSOMNIA: Status: ACTIVE | Noted: 2020-10-19

## 2022-03-19 PROBLEM — M47.22 CERVICAL RADICULOPATHY DUE TO DEGENERATIVE JOINT DISEASE OF SPINE: Status: ACTIVE | Noted: 2017-05-31

## 2022-03-19 PROBLEM — M50.20 HERNIATION OF CERVICAL INTERVERTEBRAL DISC: Status: ACTIVE | Noted: 2019-02-20

## 2022-03-19 PROBLEM — Z98.1 S/P CERVICAL SPINAL FUSION: Status: ACTIVE | Noted: 2019-02-20

## 2022-03-19 PROBLEM — R51.9 HEADACHE: Status: ACTIVE | Noted: 2021-01-19

## 2022-03-19 PROBLEM — E78.2 MIXED HYPERLIPIDEMIA: Status: ACTIVE | Noted: 2018-06-19

## 2022-03-19 PROBLEM — M48.02 CERVICAL SPINAL STENOSIS: Status: ACTIVE | Noted: 2019-01-21

## 2022-03-19 PROBLEM — K83.8 COMMON BILE DUCT DILATION: Status: ACTIVE | Noted: 2018-07-03

## 2022-03-19 PROBLEM — Z13.39 ALCOHOL SCREENING: Status: ACTIVE | Noted: 2018-04-18

## 2022-03-20 PROBLEM — M54.2 NECK PAIN: Status: ACTIVE | Noted: 2020-06-17

## 2022-03-20 PROBLEM — N18.30 STAGE 3 CHRONIC KIDNEY DISEASE (HCC): Status: ACTIVE | Noted: 2018-06-19

## 2022-03-20 PROBLEM — G56.03 BILATERAL CARPAL TUNNEL SYNDROME: Status: ACTIVE | Noted: 2017-05-31

## 2022-03-20 LAB
SARS-COV-2, XPLCVT: NOT DETECTED
SOURCE, COVRS: NORMAL

## 2022-03-22 ENCOUNTER — APPOINTMENT (OUTPATIENT)
Dept: CT IMAGING | Age: 67
End: 2022-03-22
Attending: INTERNAL MEDICINE
Payer: MEDICARE

## 2022-03-22 ENCOUNTER — ANESTHESIA (OUTPATIENT)
Dept: ENDOSCOPY | Age: 67
End: 2022-03-22
Payer: MEDICARE

## 2022-03-22 ENCOUNTER — HOSPITAL ENCOUNTER (OUTPATIENT)
Age: 67
Setting detail: OUTPATIENT SURGERY
Discharge: HOME | End: 2022-03-22
Attending: INTERNAL MEDICINE | Admitting: INTERNAL MEDICINE
Payer: MEDICARE

## 2022-03-22 ENCOUNTER — ANESTHESIA EVENT (OUTPATIENT)
Dept: ENDOSCOPY | Age: 67
End: 2022-03-22
Payer: MEDICARE

## 2022-03-22 VITALS
BODY MASS INDEX: 24.94 KG/M2 | RESPIRATION RATE: 20 BRPM | HEART RATE: 66 BPM | SYSTOLIC BLOOD PRESSURE: 111 MMHG | WEIGHT: 118.8 LBS | TEMPERATURE: 98 F | DIASTOLIC BLOOD PRESSURE: 59 MMHG | HEIGHT: 58 IN | OXYGEN SATURATION: 100 %

## 2022-03-22 PROCEDURE — 74011000250 HC RX REV CODE- 250: Performed by: NURSE ANESTHETIST, CERTIFIED REGISTERED

## 2022-03-22 PROCEDURE — 77030018712 HC DEV BLLN INFL BSC -B: Performed by: INTERNAL MEDICINE

## 2022-03-22 PROCEDURE — 74176 CT ABD & PELVIS W/O CONTRAST: CPT

## 2022-03-22 PROCEDURE — 88305 TISSUE EXAM BY PATHOLOGIST: CPT

## 2022-03-22 PROCEDURE — 77030019988 HC FCPS ENDOSC DISP BSC -B: Performed by: INTERNAL MEDICINE

## 2022-03-22 PROCEDURE — 76040000007: Performed by: INTERNAL MEDICINE

## 2022-03-22 PROCEDURE — 88342 IMHCHEM/IMCYTCHM 1ST ANTB: CPT

## 2022-03-22 PROCEDURE — 76060000032 HC ANESTHESIA 0.5 TO 1 HR: Performed by: INTERNAL MEDICINE

## 2022-03-22 PROCEDURE — 2709999900 HC NON-CHARGEABLE SUPPLY: Performed by: INTERNAL MEDICINE

## 2022-03-22 PROCEDURE — 74011250636 HC RX REV CODE- 250/636: Performed by: NURSE ANESTHETIST, CERTIFIED REGISTERED

## 2022-03-22 RX ORDER — ONDANSETRON 2 MG/ML
INJECTION INTRAMUSCULAR; INTRAVENOUS AS NEEDED
Status: DISCONTINUED | OUTPATIENT
Start: 2022-03-22 | End: 2022-03-22 | Stop reason: HOSPADM

## 2022-03-22 RX ORDER — MIDAZOLAM HYDROCHLORIDE 1 MG/ML
.25-5 INJECTION, SOLUTION INTRAMUSCULAR; INTRAVENOUS
Status: DISCONTINUED | OUTPATIENT
Start: 2022-03-22 | End: 2022-03-22 | Stop reason: HOSPADM

## 2022-03-22 RX ORDER — MIDAZOLAM HYDROCHLORIDE 1 MG/ML
0.5 INJECTION, SOLUTION INTRAMUSCULAR; INTRAVENOUS
Status: CANCELLED | OUTPATIENT
Start: 2022-03-22

## 2022-03-22 RX ORDER — PROPOFOL 10 MG/ML
INJECTION, EMULSION INTRAVENOUS AS NEEDED
Status: DISCONTINUED | OUTPATIENT
Start: 2022-03-22 | End: 2022-03-22 | Stop reason: HOSPADM

## 2022-03-22 RX ORDER — ATROPINE SULFATE 0.1 MG/ML
0.5 INJECTION INTRAVENOUS
Status: DISCONTINUED | OUTPATIENT
Start: 2022-03-22 | End: 2022-03-22 | Stop reason: HOSPADM

## 2022-03-22 RX ORDER — SODIUM CHLORIDE, SODIUM LACTATE, POTASSIUM CHLORIDE, CALCIUM CHLORIDE 600; 310; 30; 20 MG/100ML; MG/100ML; MG/100ML; MG/100ML
75 INJECTION, SOLUTION INTRAVENOUS CONTINUOUS
Status: CANCELLED | OUTPATIENT
Start: 2022-03-22

## 2022-03-22 RX ORDER — LIDOCAINE HYDROCHLORIDE 10 MG/ML
0.1 INJECTION, SOLUTION EPIDURAL; INFILTRATION; INTRACAUDAL; PERINEURAL AS NEEDED
Status: CANCELLED | OUTPATIENT
Start: 2022-03-22

## 2022-03-22 RX ORDER — SODIUM CHLORIDE, SODIUM LACTATE, POTASSIUM CHLORIDE, CALCIUM CHLORIDE 600; 310; 30; 20 MG/100ML; MG/100ML; MG/100ML; MG/100ML
75 INJECTION, SOLUTION INTRAVENOUS CONTINUOUS
Status: CANCELLED | OUTPATIENT
Start: 2022-03-22 | End: 2022-03-23

## 2022-03-22 RX ORDER — FENTANYL CITRATE 50 UG/ML
50 INJECTION, SOLUTION INTRAMUSCULAR; INTRAVENOUS AS NEEDED
Status: CANCELLED | OUTPATIENT
Start: 2022-03-22

## 2022-03-22 RX ORDER — EPINEPHRINE 0.1 MG/ML
1 INJECTION INTRACARDIAC; INTRAVENOUS
Status: DISCONTINUED | OUTPATIENT
Start: 2022-03-22 | End: 2022-03-22 | Stop reason: HOSPADM

## 2022-03-22 RX ORDER — LIDOCAINE HYDROCHLORIDE 20 MG/ML
INJECTION, SOLUTION EPIDURAL; INFILTRATION; INTRACAUDAL; PERINEURAL AS NEEDED
Status: DISCONTINUED | OUTPATIENT
Start: 2022-03-22 | End: 2022-03-22 | Stop reason: HOSPADM

## 2022-03-22 RX ORDER — HYDROMORPHONE HYDROCHLORIDE 1 MG/ML
0.2 INJECTION, SOLUTION INTRAMUSCULAR; INTRAVENOUS; SUBCUTANEOUS
Status: CANCELLED | OUTPATIENT
Start: 2022-03-22

## 2022-03-22 RX ORDER — ONDANSETRON 2 MG/ML
4 INJECTION INTRAMUSCULAR; INTRAVENOUS AS NEEDED
Status: CANCELLED | OUTPATIENT
Start: 2022-03-22

## 2022-03-22 RX ORDER — DEXTROMETHORPHAN/PSEUDOEPHED 2.5-7.5/.8
1.2 DROPS ORAL
Status: DISCONTINUED | OUTPATIENT
Start: 2022-03-22 | End: 2022-03-22 | Stop reason: HOSPADM

## 2022-03-22 RX ORDER — SODIUM CHLORIDE 0.9 % (FLUSH) 0.9 %
5-40 SYRINGE (ML) INJECTION EVERY 8 HOURS
Status: CANCELLED | OUTPATIENT
Start: 2022-03-22

## 2022-03-22 RX ORDER — OXYCODONE AND ACETAMINOPHEN 5; 325 MG/1; MG/1
1 TABLET ORAL AS NEEDED
Status: CANCELLED | OUTPATIENT
Start: 2022-03-22

## 2022-03-22 RX ORDER — MORPHINE SULFATE IN 0.9 % NACL 1 MG/ML
2 PLASTIC BAG, INJECTION (ML) INTRAVENOUS
Status: CANCELLED | OUTPATIENT
Start: 2022-03-22

## 2022-03-22 RX ORDER — MIDAZOLAM HYDROCHLORIDE 1 MG/ML
1 INJECTION, SOLUTION INTRAMUSCULAR; INTRAVENOUS AS NEEDED
Status: CANCELLED | OUTPATIENT
Start: 2022-03-22

## 2022-03-22 RX ORDER — ONDANSETRON 2 MG/ML
INJECTION INTRAMUSCULAR; INTRAVENOUS
Status: DISCONTINUED
Start: 2022-03-22 | End: 2022-03-22 | Stop reason: HOSPADM

## 2022-03-22 RX ORDER — FLUMAZENIL 0.1 MG/ML
0.2 INJECTION INTRAVENOUS
Status: DISCONTINUED | OUTPATIENT
Start: 2022-03-22 | End: 2022-03-22 | Stop reason: HOSPADM

## 2022-03-22 RX ORDER — SODIUM CHLORIDE 0.9 % (FLUSH) 0.9 %
5-40 SYRINGE (ML) INJECTION EVERY 8 HOURS
Status: DISCONTINUED | OUTPATIENT
Start: 2022-03-22 | End: 2022-03-22 | Stop reason: HOSPADM

## 2022-03-22 RX ORDER — SODIUM CHLORIDE 0.9 % (FLUSH) 0.9 %
5-40 SYRINGE (ML) INJECTION AS NEEDED
Status: CANCELLED | OUTPATIENT
Start: 2022-03-22

## 2022-03-22 RX ORDER — SODIUM CHLORIDE 9 MG/ML
50 INJECTION, SOLUTION INTRAVENOUS CONTINUOUS
Status: CANCELLED | OUTPATIENT
Start: 2022-03-22

## 2022-03-22 RX ORDER — SODIUM CHLORIDE 9 MG/ML
INJECTION, SOLUTION INTRAVENOUS
Status: DISCONTINUED | OUTPATIENT
Start: 2022-03-22 | End: 2022-03-22 | Stop reason: HOSPADM

## 2022-03-22 RX ORDER — FENTANYL CITRATE 50 UG/ML
INJECTION, SOLUTION INTRAMUSCULAR; INTRAVENOUS
Status: DISCONTINUED
Start: 2022-03-22 | End: 2022-03-22 | Stop reason: HOSPADM

## 2022-03-22 RX ORDER — NALOXONE HYDROCHLORIDE 0.4 MG/ML
0.4 INJECTION, SOLUTION INTRAMUSCULAR; INTRAVENOUS; SUBCUTANEOUS
Status: DISCONTINUED | OUTPATIENT
Start: 2022-03-22 | End: 2022-03-22 | Stop reason: HOSPADM

## 2022-03-22 RX ORDER — DIPHENHYDRAMINE HYDROCHLORIDE 50 MG/ML
12.5 INJECTION, SOLUTION INTRAMUSCULAR; INTRAVENOUS AS NEEDED
Status: CANCELLED | OUTPATIENT
Start: 2022-03-22 | End: 2022-03-22

## 2022-03-22 RX ORDER — FENTANYL CITRATE 50 UG/ML
INJECTION, SOLUTION INTRAMUSCULAR; INTRAVENOUS AS NEEDED
Status: DISCONTINUED | OUTPATIENT
Start: 2022-03-22 | End: 2022-03-22 | Stop reason: HOSPADM

## 2022-03-22 RX ORDER — SODIUM CHLORIDE 0.9 % (FLUSH) 0.9 %
5-40 SYRINGE (ML) INJECTION AS NEEDED
Status: DISCONTINUED | OUTPATIENT
Start: 2022-03-22 | End: 2022-03-22 | Stop reason: HOSPADM

## 2022-03-22 RX ORDER — SODIUM CHLORIDE 9 MG/ML
50 INJECTION, SOLUTION INTRAVENOUS CONTINUOUS
Status: CANCELLED | OUTPATIENT
Start: 2022-03-22 | End: 2022-03-23

## 2022-03-22 RX ORDER — FENTANYL CITRATE 50 UG/ML
25 INJECTION, SOLUTION INTRAMUSCULAR; INTRAVENOUS
Status: CANCELLED | OUTPATIENT
Start: 2022-03-22

## 2022-03-22 RX ORDER — SODIUM CHLORIDE 9 MG/ML
100 INJECTION, SOLUTION INTRAVENOUS CONTINUOUS
Status: DISCONTINUED | OUTPATIENT
Start: 2022-03-22 | End: 2022-03-22 | Stop reason: HOSPADM

## 2022-03-22 RX ADMIN — LIDOCAINE HYDROCHLORIDE 100 MG: 20 INJECTION, SOLUTION EPIDURAL; INFILTRATION; INTRACAUDAL; PERINEURAL at 14:33

## 2022-03-22 RX ADMIN — PROPOFOL 40 MG: 10 INJECTION, EMULSION INTRAVENOUS at 14:34

## 2022-03-22 RX ADMIN — PROPOFOL 20 MG: 10 INJECTION, EMULSION INTRAVENOUS at 14:54

## 2022-03-22 RX ADMIN — FENTANYL CITRATE 50 MCG: 50 INJECTION, SOLUTION INTRAMUSCULAR; INTRAVENOUS at 15:09

## 2022-03-22 RX ADMIN — PROPOFOL 50 MG: 10 INJECTION, EMULSION INTRAVENOUS at 14:38

## 2022-03-22 RX ADMIN — PROPOFOL 30 MG: 10 INJECTION, EMULSION INTRAVENOUS at 14:57

## 2022-03-22 RX ADMIN — ONDANSETRON HYDROCHLORIDE 4 MG: 2 INJECTION, SOLUTION INTRAMUSCULAR; INTRAVENOUS at 15:10

## 2022-03-22 RX ADMIN — SODIUM CHLORIDE: 0.9 INJECTION, SOLUTION INTRAVENOUS at 14:28

## 2022-03-22 RX ADMIN — PROPOFOL 30 MG: 10 INJECTION, EMULSION INTRAVENOUS at 14:42

## 2022-03-22 RX ADMIN — PROPOFOL 30 MG: 10 INJECTION, EMULSION INTRAVENOUS at 14:47

## 2022-03-22 RX ADMIN — PROPOFOL 20 MG: 10 INJECTION, EMULSION INTRAVENOUS at 14:51

## 2022-03-22 RX ADMIN — PROPOFOL 80 MG: 10 INJECTION, EMULSION INTRAVENOUS at 14:33

## 2022-03-22 RX ADMIN — FENTANYL CITRATE 50 MCG: 50 INJECTION, SOLUTION INTRAMUSCULAR; INTRAVENOUS at 15:10

## 2022-03-22 NOTE — PROGRESS NOTES
After procedure was complete the patient woke up c/o excruciating abdominal pain. Anesthesia came in to evaluate patient. The patient was given 100mcg of Fentanyl. Patient was taken immediately to CT for xrays.

## 2022-03-22 NOTE — ANESTHESIA POSTPROCEDURE EVALUATION
Procedure(s):  COLONOSCOPY  ESOPHAGOGASTRODUODENOSCOPY (EGD)  ESOPHAGOGASTRODUODENAL (EGD) BIOPSY  ESOPHAGEAL DILATION. general, total IV anesthesia    Anesthesia Post Evaluation        Patient location during evaluation: PACU  Note status: Adequate. Level of consciousness: responsive to verbal stimuli and sleepy but conscious  Pain management: satisfactory to patient  Airway patency: patent  Anesthetic complications: no  Cardiovascular status: acceptable  Respiratory status: acceptable  Hydration status: acceptable  Comments: +Post-Anesthesia Evaluation and Assessment    Patient: Trish Crockett MRN: [id number]  SSN: [id number]   YOB: 1955  Age: 78 y.o. Sex: female      Cardiovascular Function/Vital Signs    /70   Pulse 66   Temp 36.7 °C (98 °F)   Resp 11   Ht 4' 10\" (1.473 m)   Wt 53.9 kg (118 lb 12.8 oz)   SpO2 100%   Breastfeeding No   BMI 24.83 kg/m²     Patient is status post Procedure(s):  COLONOSCOPY  ESOPHAGOGASTRODUODENOSCOPY (EGD)  ESOPHAGOGASTRODUODENAL (EGD) BIOPSY  ESOPHAGEAL DILATION. Nausea/Vomiting: Controlled. Postoperative hydration reviewed and adequate. Pain:  Pain Scale 1: Numeric (0 - 10) (03/22/22 1549)  Pain Intensity 1: 1 (03/22/22 1549)   Managed. Neurological Status: At baseline. Mental Status and Level of Consciousness: Arousable. Pulmonary Status:   O2 Device: None (Room air) (03/22/22 1549)   Adequate oxygenation and airway patent. Complications related to anesthesia: None    Post-anesthesia assessment completed. No concerns. Signed By: Maria Eugenia Dia DO    3/22/2022  Post anesthesia nausea and vomiting:  controlled      INITIAL Post-op Vital signs:   Vitals Value Taken Time   /70 03/22/22 1549   Temp 36.7 °C (98 °F) 03/22/22 1526   Pulse 67 03/22/22 1556   Resp 17 03/22/22 1556   SpO2 99 % 03/22/22 1556   Vitals shown include unvalidated device data.

## 2022-03-22 NOTE — ROUTINE PROCESS
Deandre Brian  1955  [id number]    Situation:  Verbal report received from: Debby Amaya  Procedure: Procedure(s):  COLONOSCOPY  ESOPHAGOGASTRODUODENOSCOPY (EGD)  ESOPHAGOGASTRODUODENAL (EGD) BIOPSY  ESOPHAGEAL DILATION    Background:    Preoperative diagnosis: Screen for colon cancer [Z12.11]  Gastroesophageal reflux disease, unspecified whether esophagitis present [K21.9]  Postoperative diagnosis: Gastritis, Esophagitis, Diverticulosis    :  Dr. Eddie Gutiérrez  Assistant(s): Endoscopy Technician-1: Frankey Becket  Endoscopy RN-1: Katarina Richards    Specimens:   ID Type Source Tests Collected by Time Destination   1 : Stomach bx Preservative Stomach  Ora Nevarez MD 3/22/2022 1431 Pathology   2 : 800 Lauren Kevin MD 3/22/2022 1431 Pathology     H. Pylori  no    Assessment:  Intra-procedure medications     Fentanyl **yes*100 mcg  Zofran- 4mg    Anesthesia gave intra-procedure sedation and medications, see anesthesia flow sheet yes    Intravenous fluids: NS@ KVO     Vital signs stable     Abdominal assessment: round and soft     Recommendation:  Discharge patient per MD order.   Family or Friend   Permission to share finding with family or friend yes

## 2022-03-22 NOTE — ANESTHESIA PREPROCEDURE EVALUATION
Anesthetic History   No history of anesthetic complications            Review of Systems / Medical History  Patient summary reviewed, nursing notes reviewed and pertinent labs reviewed    Pulmonary  Within defined limits                 Neuro/Psych         Psychiatric history    Comments: Neck pain (M54.2)    Back pain (M54.9)    Bilateral carpal tunnel syndrome Cardiovascular    Hypertension              Exercise tolerance: >4 METS     GI/Hepatic/Renal  Within defined limits              Endo/Other      Hypothyroidism  Arthritis     Other Findings              Physical Exam    Airway  Mallampati: III  TM Distance: 4 - 6 cm  Neck ROM: normal range of motion   Mouth opening: Normal     Cardiovascular  Regular rate and rhythm,  S1 and S2 normal,  no murmur, click, rub, or gallop  Rhythm: regular  Rate: normal         Dental  No notable dental hx       Pulmonary  Breath sounds clear to auscultation               Abdominal  Abdominal exam normal       Other Findings            Anesthetic Plan    ASA: 2  Anesthesia type: general and total IV anesthesia          Induction: Intravenous  Anesthetic plan and risks discussed with: Patient      Propofol MAC

## 2022-03-22 NOTE — H&P
Mary Ferrera MD  [facility], [address], [address], [address] [facility]  [phone]  [email or web address]    Gastroenterology Outpatient History and Physical    Patient: Ferndalewilber Hilario    Physician: Steve Gaitan MD    Vital Signs: Blood pressure (!) 123/54, pulse 76, temperature 97.9 °F (36.6 °C), resp. rate 14, height 4' 10\" (1.473 m), weight 53.9 kg (118 lb 12.8 oz), SpO2 96 %, not currently breastfeeding. Allergies: No Known Allergies    Chief Complaint: Screening colonoscopy    History of Present Illness: Here for a screening colonoscopy. Last colonoscopy was 2011 and showed 2011 by Dr Liat Layton and showed no polyps. Currently has some GERD and solid food dysphagia as far as GI symptoms. No FH of colon cancer or polyps.     History:  Past Medical History:   Diagnosis Date    Adverse effect of anesthesia     Pt states her daughter had an Anaphylactic reaction to Anesthesia    Arthritis     Back pain     Hypertension     Single episode    Menopause     Neck pain     Psychiatric disorder     Depression, Anxiety    Thyroid disease       Past Surgical History:   Procedure Laterality Date    HX BACK SURGERY      c4-5 fusion    HX ORTHOPAEDIC      Rotator Cuff Repair, Right    HX ORTHOPAEDIC      Carpal Tunnel Repair, Right      Social History     Socioeconomic History    Marital status:    Tobacco Use    Smoking status: Never Smoker    Smokeless tobacco: Never Used   Vaping Use    Vaping Use: Never used   Substance and Sexual Activity    Alcohol use: Yes     Comment: 3 Drinks per month    Drug use: No    Sexual activity: Not Currently      Family History   Problem Relation Age of Onset    Breast Cancer Paternal Grandmother         over 48    Stroke Mother     Heart Attack Father     Other Daughter         anaphylactic reaction to anesthesia      Patient Active Problem List   Diagnosis Code    Bilateral carpal tunnel syndrome G56.03    Cervical radiculopathy due to degenerative joint disease of spine M47.22    Acquired hypothyroidism E03.9    Alcohol screening Z13.39    Age-related osteoporosis without current pathological fracture M81.0    Primary osteoarthritis involving multiple joints M89.49    Stage 3 chronic kidney disease (HCC) N18.30    Mixed hyperlipidemia E78.2    Elevated liver enzymes R74.8    Common bile duct dilation K83.8    Gastroenteritis K52.9    Cervical spinal stenosis M48.02    Degenerative disc disease, cervical M50.30    Herniation of cervical intervertebral disc M50.20    S/P cervical spinal fusion Z98.1    Acute cystitis N30.00    Acute pain of right knee M25.561    Neck pain M54.2    Depression, major, recurrent, moderate (HCC) F33.1    Chest pain on breathing R07.1    Primary insomnia F51.01    Headache R51.9    Acute midline low back pain without sciatica M54.50       Medications:   Prior to Admission medications    Medication Sig Start Date End Date Taking? Authorizing Provider   atorvastatin (LIPITOR) 10 mg tablet TAKE 1 TABLET BY MOUTH EVERY DAY 3/9/22  Yes Shiva Simpson MD   sertraline (ZOLOFT) 100 mg tablet Take 1 Tablet by mouth daily. 2/23/22  Yes Shiva Simpson MD   levothyroxine (SYNTHROID) 137 mcg tablet Take 1 Tablet by mouth Daily (before breakfast). 1/27/22  Yes Shiva Simpson MD   diclofenac EC (VOLTAREN) 75 mg EC tablet TAKE 1 TABLET BY MOUTH TWICE A DAY 7/19/21  Yes Shiva Simpson MD   multivitamin (ONE A DAY) tablet Take 1 Tab by mouth daily. Yes Provider, Historical       Physical Exam:     General: well developed, well nourished   HEENT: unremarkable   Heart: regular rhythm no mumur    Lungs: clear   Abdominal:  benign   Neurological: unremarkable   Extremities: no edema     Findings/Diagnosis: Screening colonoscopy.  EGD for dysphagia    Plan of Care/Planned Procedure: Colonoscopy and EGD with monitored anesthesia care sedation    Signed:  Sreedhar Colunga MD 3/22/2022

## 2022-03-22 NOTE — PROCEDURES
Josse Monroe                  Colonoscopy Operative Report    3/22/2022      Vin Range  [id number]  1955    Procedure Type:   Colonoscopy --screening     Indications:    Screening colonoscopy     Pre-operative Diagnosis: see indication above    Post-operative Diagnosis:  See findings below    :  Rk Jacinto MD    Referring Provider: Cosme Seymour MD      Sedation:  MAC anesthesia Propofol    Pre-Procedural Exam:      Airway: clear,  No airway problems anticipated  Heart: RRR, without gallops or rubs  Lungs: clear bilaterally without wheezes, crackles, or rhonchi  Abdomen: soft, nontender, nondistended, bowel sounds present  Mental Status: awake, alert and oriented to person, place and time     Procedure Details:  After informed consent was obtained with all risks and benefits of procedure explained and preoperative exam completed, the patient was taken to the endoscopy suite and placed in the left lateral decubitus position. Upon sequential sedation as per above, a digital rectal exam was performed . The Olympus videocolonoscope  was inserted in the rectum and carefully advanced to the cecum, which was identified by the ileocecal valve and appendiceal orifice. The cecum was identified by the ileocecal valve and appendiceal orifice. The quality of preparation was good. The colonoscope was slowly withdrawn with careful evaluation between folds. Retroflexion in the rectum was completed demonstrating internal hemorrhoids. Findings:   Rectum: Grade 1 internal hemorrhoid(s); Sigmoid: normal  Descending Colon: normal  Transverse Colon: normal  Ascending Colon: normal  Cecum: normal  Terminal Ileum: not intubated    No polyps identified. No mucosal lesions. Specimen Removed:  none    Complications: Patient awoke from the colonoscopy with severe diffuse abdominal pain. Pt to be taken to CT scan STAT     EBL:  None.     Impression:    normal colonic mucosa throughout  Severe abdominal pain as awoke from anesthesia. Recommendations: --To CT scan STAT        Miguel Breaux MD    3/22/2022     RENY Boateng MD  [facility], [address] [place] [address]  [phone]  [email or web address]

## 2022-03-22 NOTE — DISCHARGE INSTRUCTIONS
Janette Gudino MD  [facility], [address]  [place], [address] [facility]  [phone]  [facility]. [facility]  [id number]  1955    EGD DISCHARGE INSTRUCTIONS    Discomfort:  Sore throat- throat lozenges or warm salt water gargle  redness at IV site- apply warm compress to area; if redness or soreness persist- contact your physician  Gaseous discomfort- walking, belching will help relieve any discomfort  You may not operate a vehicle for 12 hours  You may not engage in an occupation involving machinery or appliances for rest of today  You may not drink alcoholic beverages for at least 12 hours  Avoid making any critical decisions for at least 24 hour  DIET  You may have anything by mouth. You may eat and drink immediately. You may resume your regular diet - however -  remember your colon is empty and a heavy meal will produce gas. Avoid these foods:  vegetables, fried / greasy foods, carbonated drinks      ACTIVITY  You may resume your normal daily activities   Spend the remainder of the day resting -  avoid any strenuous activity. CALL M.D. ANY SIGN OF   Increasing pain, nausea, vomiting  Abdominal distension (swelling)  New increased bleeding (oral or rectal)  Fever (chills)  Pain in chest area  Bloody discharge from nose or mouth  Shortness of breath    Follow-up Instructions:   Call Dr. Janette Gudino for any questions or problems. Telephone # [phone]  Dr. Omero Vasquez office will notify you of the biopsy results available  Within 7 to 10 days. We will call you or send a letter     Can try omeprazole 20 mg daily and use gaviscon liquid or tablets as needed for the reflux. ENDOSCOPY FINDINGS:   Your endoscopy showed no esophagitis. We did dilate the esophagus and took some biopsies to check for microscopic esophagitis. Stephon Mares       DISCHARGE SUMMARY from Nurse    The following personal items collected during your admission are returned to you:   Dental Appliance: Dental Appliances: None  Vision: Visual Aid: Glasses,With patient  Hearing Aid:    Jewelry:    Clothing:    Other Valuables:    Valuables sent to safe:     COLON DISCHARGE INSTRUCTIONS  Discomfort:  Redness at IV site- apply warm compress to area; if redness or soreness persist- contact your physician  There may be a slight amount of blood passed from the rectum  Gaseous discomfort- walking, belching will help relieve any discomfort  You may not operate a vehicle for 12 hours  You may not engage in an occupation involving machinery or appliances for rest of today  You may not drink alcoholic beverages for at least 12 hours  Avoid making any critical decisions for at least 24 hour  DIET:   Regular diet. - however -  remember your colon is empty and a heavy meal will produce gas. Avoid these foods:  vegetables, fried / greasy foods, carbonated drinks for today      ACTIVITY:  You may resume your normal daily activities it is recommended that you spend the remainder of the day resting -  avoid any strenuous activity. CALL M.D. ANY SIGN OF:   Increasing pain, nausea, vomiting  Abdominal distension (swelling)  New increased bleeding (oral or rectal)  Fever (chills)  Pain in chest area  Bloody discharge from nose or mouth  Shortness of breath     COLONOSCOPY FINDINGS:  Your colonoscopy showed: no polyps found. Do have some internal hemorrhoids. Follow-up Instructions:   Call Dr. Keesha Rainey if any questions or problems. Telephone # [phone]    Should have a repeat colonoscopy in 10 years.

## 2022-03-22 NOTE — H&P
[facility]                   Endoscopic Gastroduodenoscopy Procedure Note      3/22/2022  Rubina Toledo  1955  [id number]    Procedure: Endoscopic Gastroduodenoscopy with biopsy, esophageal dilation    Indication:  Dysphagia/odynophagia, GERD     Pre-operative Diagnosis: see indication above    Post-operative Diagnosis: see findings below    : RENY Diana MD    Referring Provider:  Channing Esteban MD      Anesthesia/Sedation:  MAC anesthesia Propofol    Airway assessment: No airway problems anticipated    Pre-Procedural Exam:      Airway: clear, no airway problems anticipated  Heart: RRR, without gallops or rubs  Lungs: clear bilaterally without wheezes, crackles, or rhonchi  Abdomen: soft, nontender, nondistended, bowel sounds present  Mental Status: awake, alert and oriented to person, place and time       Procedure Details     After infomed consent was obtained for the procedure, with all risks and benefits of procedure explained the patient was taken to the endoscopy suite and placed in the left lateral decubitus position. Following sequential administration of sedation as per above, the endoscope was inserted into the mouth and advanced under direct vision to second portion of the duodenum. A careful inspection was made as the gastroscope was withdrawn, including a retroflexed view of the proximal stomach; findings and interventions are described below. Findings:   Esophagus: Chronic appearing esophagitis, biopsied GE junction. Snug LES but no discrete stricture. Dilated with 54 Fr sun  Stomach: Moderate antritis, biopsied  Duodenum: normal    Therapies:  esophageal dilation with 47 Fr sun  biopsy of esophagus and dilation  biopsy of stomach antrum    Specimens: 1. Gastric antral biopsies  2 biopsies of GE junction           Complications:   None; patient tolerated the procedure well. EBL:  None.             Impression: -Normal upper endoscopy, with no endoscopic evidence of neoplasia or mucosal abnormality. , -Empiric esophageal dilation performed    Recommendations:  -Follow up with primary care physician    Janey Gandhi., [id number]

## 2022-03-25 RX ORDER — ATORVASTATIN CALCIUM 10 MG/1
10 TABLET, FILM COATED ORAL DAILY
Qty: 90 TABLET | Refills: 3 | Status: SHIPPED | OUTPATIENT
Start: 2022-03-25

## 2022-03-25 NOTE — TELEPHONE ENCOUNTER
PCP: Silvio Negrete MD    Last appt: 3/7/2022  Future Appointments   Date Time Provider Bonnie Jo   4/29/2022  8:50 AM Silvio Negrete MD [facility] BS AMB       Requested Prescriptions     Pending Prescriptions Disp Refills    atorvastatin (LIPITOR) 10 mg tablet 90 Tablet 3     Sig: Take 1 Tablet by mouth daily.        Prior labs and Blood pressures:  BP Readings from Last 3 Encounters:   03/22/22 (!) 111/59   03/07/22 120/70   01/27/22 112/72     Lab Results   Component Value Date/Time    Sodium 137 01/27/2022 10:20 AM    Potassium 4.8 01/27/2022 10:20 AM    Chloride 104 01/27/2022 10:20 AM    CO2 29 01/27/2022 10:20 AM    Anion gap 4 (L) 01/27/2022 10:20 AM    Glucose 99 01/27/2022 10:20 AM    BUN 24 (H) 01/27/2022 10:20 AM    Creatinine 1.04 (H) 01/27/2022 10:20 AM    BUN/Creatinine ratio 23 (H) 01/27/2022 10:20 AM    GFR est AA >60 01/27/2022 10:20 AM    GFR est non-AA 53 (L) 01/27/2022 10:20 AM    Calcium 9.6 01/27/2022 10:20 AM     Lab Results   Component Value Date/Time    Hemoglobin A1c 5.8 02/13/2019 08:55 AM     Lab Results   Component Value Date/Time    Cholesterol, total 222 (H) 01/27/2022 10:20 AM    Cholesterol (POC) 231.0 (A) 04/18/2018 12:02 PM    HDL Cholesterol 98 01/27/2022 10:20 AM    HDL Cholesterol (POC) 94.0 04/18/2018 12:02 PM    LDL Cholesterol (POC) 119.4 04/18/2018 12:02 PM    LDL, calculated 111.8 (H) 01/27/2022 10:20 AM    VLDL, calculated 12.2 01/27/2022 10:20 AM    Triglyceride 61 01/27/2022 10:20 AM    Triglycerides (POC) 88.0 04/18/2018 12:02 PM    CHOL/HDL Ratio 2.3 01/27/2022 10:20 AM     Lab Results   Component Value Date/Time    VITAMIN D, 25-HYDROXY 40 04/18/2019 09:23 AM       Lab Results   Component Value Date/Time    TSH 1.02 07/26/2021 12:24 PM    TSH, 3rd generation 1.82 10/19/2020 10:56 AM

## 2022-04-14 NOTE — ADDENDUM NOTE
Addendum  created 04/14/22 [id number] by Tracy Carrington MD    Attestation recorded in [facility], [facility] 97 filed

## 2022-04-28 NOTE — PROGRESS NOTES
Chief Complaint   Patient presents with    Hypothyroidism     3 month follow up    Cholesterol Problem       SUBJECTIVE:    Lisa Alex is a 78 y.o. female is in follow-up for medical problems include hypertension, hyperlipidemia, DJD, osteoporosis, hypothyroidism, prior elevated liver enzymes, depression controlled and other multiple medical problems. She is taking her medications trying to follow her diet and try and remain physically active. She is noting some left hip pain that hurts when she walks. She knows she has arthritis and she thinks that is probably what is causing the problem. She denies any chest pain, shortness of breath, palpitations, PND, orthopnea or other cardiac respiratory complaints. There are no current GI or  complaints. There are no headaches, dizziness or neurologic complaints. There are no current active arthritic complaints other than that related to the hip and no other complaints on complete review systems. Current Outpatient Medications   Medication Sig Dispense Refill    calcium carbonate (CALCIUM 300 PO) Take 1 Tablet by mouth daily.  methylPREDNISolone acetate (DEPO-MEDROL) 40 mg/mL injection 1 mL by IntraMUSCular route once for 1 dose. 1 mL 0    atorvastatin (LIPITOR) 10 mg tablet Take 1 Tablet by mouth daily. 90 Tablet 3    sertraline (ZOLOFT) 100 mg tablet Take 1 Tablet by mouth daily. 90 Tablet 3    levothyroxine (SYNTHROID) 137 mcg tablet Take 1 Tablet by mouth Daily (before breakfast). 90 Tablet 3    diclofenac EC (VOLTAREN) 75 mg EC tablet TAKE 1 TABLET BY MOUTH TWICE A  Tablet 3    multivitamin (ONE A DAY) tablet Take 1 Tab by mouth daily.        Past Medical History:   Diagnosis Date    Adverse effect of anesthesia     Pt states her daughter had an Anaphylactic reaction to Anesthesia    Arthritis     Back pain     Hypertension     Single episode    Menopause     Neck pain     Psychiatric disorder     Depression, Anxiety    Thyroid disease      Past Surgical History:   Procedure Laterality Date    COLONOSCOPY N/A 3/22/2022    COLONOSCOPY performed by Galen [facility] at [facility] ENDOSCOPY    HX BACK SURGERY      c4-5 fusion    HX ORTHOPAEDIC      Rotator Cuff Repair, Right    HX ORTHOPAEDIC      Carpal Tunnel Repair, Right    AL COLON CA SCRN NOT HI RSK IND  3/22/2022          No Known Allergies    REVIEW OF SYSTEMS:  General: negative for - chills or fever, or weight loss or gain  ENT: negative for - headaches, nasal congestion or tinnitus  Eyes: no blurred or visual changes  Neck: No stiffness or swollen nodes  Respiratory: negative for - cough, hemoptysis, shortness of breath or wheezing  Cardiovascular : negative for - chest pain, edema, palpitations or shortness of breath  Gastrointestinal: negative for - abdominal pain, blood in stools, heartburn or nausea/vomiting  Genito-Urinary: no dysuria, trouble voiding, or hematuria  Musculoskeletal: negative for - gait disturbance, joint pain, joint stiffness or joint swelling except left hip it hurts quite a bit to walk  Neurological: no TIA or stroke symptoms  Hematologic: no bruises, no bleeding  Lymphatic: no swollen glands  Integument: no lumps, mole changes, nail changes or rash  Endocrine:no malaise/lethargy poly uria or polydipsia or unexpected weight changes        Social History     Socioeconomic History    Marital status:    Tobacco Use    Smoking status: Never Smoker    Smokeless tobacco: Never Used   Vaping Use    Vaping Use: Never used   Substance and Sexual Activity    Alcohol use: Yes     Comment: 3 Drinks per month    Drug use: No    Sexual activity: Not Currently     Family History   Problem Relation Age of Onset    Breast Cancer Paternal Grandmother         over 48    Stroke Mother     Heart Attack Father     Other Daughter         anaphylactic reaction to anesthesia       OBJECTIVE:     Visit Vitals  /70 (BP 1 Location: Left upper arm, BP Patient Position: Sitting, BP Cuff Size: Adult)   Pulse 76   Temp 98 °F (36.7 °C) (Oral)   Resp 16   Ht 4' 10\" (1.473 m)   Wt 122 lb (55.3 kg)   SpO2 98%   BMI 25.50 kg/m²     CONSTITUTIONAL:   well nourished, appears age appropriate  EYES: sclera anicteric, PERRL, EOMI  ENMT:nares clear, moist mucous membranes, pharynx clear  NECK: supple. Thyroid normal, No JVD or bruits  RESPIRATORY: Chest: clear to ascultation and percussion, normal inspiratory effort  CARDIOVASCULAR: Heart: regular rate and rhythm no murmurs, rubs or gallops, PMI not displaced, No thrills, no peripheral edema  GASTROINTESTINAL: Abdomen: non distended, soft, non tender, bowel sounds normal  HEMATOLOGIC: no purpura, petechiae or bruising  LYMPHATIC: No lymph node enlargemant  MUSCULOSKELETAL: Extremities: no active synovitis, pulse 1+. Left hip tender to palpation with increased discomfort on range of motion. No joint effusion, erythema increased temperature. INTEGUMENT: No unusual rashes or suspicious skin lesions noted. Nails appear normal.  PERIPHERAL VASCULAR: normal pulses femoral, PT and DP  NEUROLOGIC: non-focal exam, A & O X 3  PSYCHIATRIC:, appropriate affect     ASSESSMENT:   1. Mixed hyperlipidemia    2. Primary osteoarthritis involving multiple joints    3. Stage 3 chronic kidney disease, unspecified whether stage 3a or 3b CKD (HCC)    4. Age-related osteoporosis without current pathological fracture    5. Acquired hypothyroidism    6. Primary osteoarthritis of left hip      Impression  1. Hyperlipidemia prior lab reviewed repeat status pending I will adjust if needed. 2.  DJD stable except for the left hip  3. CKD stage III repeat status pending  4. Osteoporosis reviewed prior bone density  5 hypothyroidism stable on last check  6 DJD left hip. We did discuss treatment options and per her request elected to go with injection.   After informed consent and Betadine scrub the left hip is entered laterally and injected with Depo-Medrol 40 mg a cc lidocaine which tolerated quite well. Follow-up in 3 months or sooner if there is a problem. I will call the lab results in interim. PLAN:  .  Orders Placed This Encounter    DRAIN/INJECT LARGE JOINT/BURSA    METABOLIC PANEL, COMPREHENSIVE    LIPID PANEL    CK    calcium carbonate (CALCIUM 300 PO)    methylPREDNISolone acetate (DEPO-MEDROL) 40 mg/mL injection         ATTENTION:   This medical record was transcribed using an electronic medical records system. Although proofread, it may and can contain electronic and spelling errors. Other human spelling and other errors may be present. Corrections may be executed at a later time. Please feel free to contact us for any clarifications as needed. Follow-up and Dispositions    · Return in about 3 months (around 7/29/2022). No results found for any visits on 04/29/22. Sorin Denise MD    The patient verbalized understanding of the problems and plans as explained.

## 2022-04-29 ENCOUNTER — OFFICE VISIT (OUTPATIENT)
Dept: INTERNAL MEDICINE CLINIC | Age: 67
End: 2022-04-29
Payer: MEDICARE

## 2022-04-29 VITALS
BODY MASS INDEX: 25.61 KG/M2 | SYSTOLIC BLOOD PRESSURE: 114 MMHG | WEIGHT: 122 LBS | HEART RATE: 76 BPM | RESPIRATION RATE: 16 BRPM | HEIGHT: 58 IN | OXYGEN SATURATION: 98 % | TEMPERATURE: 98 F | DIASTOLIC BLOOD PRESSURE: 70 MMHG

## 2022-04-29 DIAGNOSIS — N18.30 STAGE 3 CHRONIC KIDNEY DISEASE, UNSPECIFIED WHETHER STAGE 3A OR 3B CKD (HCC): ICD-10-CM

## 2022-04-29 DIAGNOSIS — M81.0 AGE-RELATED OSTEOPOROSIS WITHOUT CURRENT PATHOLOGICAL FRACTURE: ICD-10-CM

## 2022-04-29 DIAGNOSIS — M15.9 PRIMARY OSTEOARTHRITIS INVOLVING MULTIPLE JOINTS: ICD-10-CM

## 2022-04-29 DIAGNOSIS — E78.2 MIXED HYPERLIPIDEMIA: Primary | ICD-10-CM

## 2022-04-29 DIAGNOSIS — M16.12 PRIMARY OSTEOARTHRITIS OF LEFT HIP: ICD-10-CM

## 2022-04-29 DIAGNOSIS — E03.9 ACQUIRED HYPOTHYROIDISM: ICD-10-CM

## 2022-04-29 PROCEDURE — 99214 OFFICE O/P EST MOD 30 MIN: CPT | Performed by: INTERNAL MEDICINE

## 2022-04-29 PROCEDURE — 1090F PRES/ABSN URINE INCON ASSESS: CPT | Performed by: INTERNAL MEDICINE

## 2022-04-29 PROCEDURE — G8419 CALC BMI OUT NRM PARAM NOF/U: HCPCS | Performed by: INTERNAL MEDICINE

## 2022-04-29 PROCEDURE — 3017F COLORECTAL CA SCREEN DOC REV: CPT | Performed by: INTERNAL MEDICINE

## 2022-04-29 PROCEDURE — G9717 DOC PT DX DEP/BP F/U NT REQ: HCPCS | Performed by: INTERNAL MEDICINE

## 2022-04-29 PROCEDURE — G9899 SCRN MAM PERF RSLTS DOC: HCPCS | Performed by: INTERNAL MEDICINE

## 2022-04-29 PROCEDURE — 1101F PT FALLS ASSESS-DOCD LE1/YR: CPT | Performed by: INTERNAL MEDICINE

## 2022-04-29 PROCEDURE — 20610 DRAIN/INJ JOINT/BURSA W/O US: CPT | Performed by: INTERNAL MEDICINE

## 2022-04-29 PROCEDURE — G8427 DOCREV CUR MEDS BY ELIG CLIN: HCPCS | Performed by: INTERNAL MEDICINE

## 2022-04-29 PROCEDURE — G8536 NO DOC ELDER MAL SCRN: HCPCS | Performed by: INTERNAL MEDICINE

## 2022-04-29 RX ORDER — METHYLPREDNISOLONE ACETATE 40 MG/ML
40 INJECTION, SUSPENSION INTRA-ARTICULAR; INTRALESIONAL; INTRAMUSCULAR; SOFT TISSUE ONCE
Qty: 1 ML | Refills: 0
Start: 2022-04-29 | End: 2022-04-29

## 2022-04-29 NOTE — PROGRESS NOTES
Chief Complaint   Patient presents with    Hypothyroidism     3 month follow up    Cholesterol Problem     Visit Vitals  /70 (BP 1 Location: Left upper arm, BP Patient Position: Sitting, BP Cuff Size: Adult)   Pulse 76   Temp 98 °F (36.7 °C) (Oral)   Resp 16   Ht 4' 10\" (1.473 m)   Wt 122 lb (55.3 kg)   SpO2 98%   BMI 25.50 kg/m²     1. Have you been to the ER, urgent care clinic since your last visit? Hospitalized since your last visit? No    2. Have you seen or consulted any other health care providers outside of the [address] [place] [address] since your last visit? Include any pap smears or colon screening.  No

## 2022-04-29 NOTE — PATIENT INSTRUCTIONS
Arthritis: Care Instructions  Overview     Arthritis, also called osteoarthritis, is a breakdown of the cartilage that cushions your joints. When the cartilage wears down, your bones rub against each other. This causes pain and stiffness. Many people have some arthritis as they age. Arthritis most often affects the joints of the spine, hands, hips, knees, or feet. Arthritis never goes away completely. But medicine and home treatment can help reduce pain and help you stay active. Follow-up care is a key part of your treatment and safety. Be sure to make and go to all appointments, and call your doctor if you are having problems. It's also a good idea to know your test results and keep a list of the medicines you take. How can you care for yourself at home? · Stay at a healthy weight. Being overweight puts extra strain on your joints. · Talk to your doctor or physical therapist about exercises that will help ease joint pain. ? Stretch. You may enjoy gentle forms of yoga to help keep your joints and muscles flexible. ? Walk instead of jog. Other types of exercise that are less stressful on the joints include riding a bike, swimming, ambrocio chi, or water exercise. ? Lift weights. Strong muscles help reduce stress on your joints. Stronger thigh muscles, for example, take some of the stress off of the knees and hips. Learn the right way to lift weights so you don't make joint pain worse. · Take your medicines exactly as prescribed. Call your doctor if you think you are having a problem with your medicine. · Take pain medicines exactly as directed. ? If the doctor gave you a prescription medicine for pain, take it as prescribed. ? If you are not taking a prescription pain medicine, ask your doctor if you can take an over-the-counter medicine. · Use a cane, crutch, walker, or another device if you need help to get around. These can help rest your joints.  You also can use other things to make life easier, such as a higher toilet seat and padded handles on kitchen utensils. · Do not sit in low chairs. They can make it hard to get up. · Put heat or cold on your sore joints as needed. Use whichever helps you most. You can also switch between hot and cold packs. ? Apply heat 2 or 3 times a day for 20 to 30 minutesusing a heating pad, hot shower, or hot packto relieve pain and stiffness. But don't use heat on a swollen joint. ? Put ice or a cold pack on your sore joint for 10 to 20 minutes at a time. Put a thin cloth between the ice and your skin. When should you call for help? Call your doctor now or seek immediate medical care if:    · You have sudden swelling, warmth, or pain in any joint.     · You have joint pain and a fever or rash.     · You have such bad pain that you cannot use a joint. Watch closely for changes in your health, and be sure to contact your doctor if:    · You have mild joint symptoms that continue even with more than 6 weeks of care at home.     · You have stomach pain or other problems with your medicine. Where can you learn more? Go to http://www.gray.com/  Enter X907 in the search box to learn more about \"Arthritis: Care Instructions. \"  Current as of: December 20, 2021               Content Version: 13.2  © 5638-2247 [employer]. Care instructions adapted under license by [employer] (which disclaims liability or warranty for this information). If you have questions about a medical condition or this instruction, always ask your healthcare professional. [place] [address] any warranty or liability for your use of this information.

## 2022-04-30 LAB
ALBUMIN SERPL-MCNC: 4.3 G/DL (ref 3.5–5)
ALBUMIN/GLOB SERPL: 1.3 {RATIO} (ref 1.1–2.2)
ALP SERPL-CCNC: 60 U/L (ref 45–117)
ALT SERPL-CCNC: 41 U/L (ref 12–78)
ANION GAP SERPL CALC-SCNC: 9 MMOL/L (ref 5–15)
AST SERPL-CCNC: 42 U/L (ref 15–37)
BILIRUB SERPL-MCNC: 0.5 MG/DL (ref 0.2–1)
BUN SERPL-MCNC: 30 MG/DL (ref 6–20)
BUN/CREAT SERPL: 30 (ref 12–20)
CALCIUM SERPL-MCNC: 9.1 MG/DL (ref 8.5–10.1)
CHLORIDE SERPL-SCNC: 106 MMOL/L (ref 97–108)
CHOLEST SERPL-MCNC: 231 MG/DL
CK SERPL-CCNC: 193 U/L (ref 26–192)
CO2 SERPL-SCNC: 25 MMOL/L (ref 21–32)
CREAT SERPL-MCNC: 1 MG/DL (ref 0.55–1.02)
GLOBULIN SER CALC-MCNC: 3.3 G/DL (ref 2–4)
GLUCOSE SERPL-MCNC: 97 MG/DL (ref 65–100)
HDLC SERPL-MCNC: 88 MG/DL
HDLC SERPL: 2.6 {RATIO} (ref 0–5)
LDLC SERPL CALC-MCNC: 124.6 MG/DL (ref 0–100)
POTASSIUM SERPL-SCNC: 4.5 MMOL/L (ref 3.5–5.1)
PROT SERPL-MCNC: 7.6 G/DL (ref 6.4–8.2)
SODIUM SERPL-SCNC: 140 MMOL/L (ref 136–145)
TRIGL SERPL-MCNC: 92 MG/DL (ref ?–150)
VLDLC SERPL CALC-MCNC: 18.4 MG/DL

## 2022-07-28 PROBLEM — Z00.00 MEDICARE ANNUAL WELLNESS VISIT, INITIAL: Status: ACTIVE | Noted: 2022-07-28

## 2022-07-28 PROBLEM — M25.561 ACUTE PAIN OF RIGHT KNEE: Status: RESOLVED | Noted: 2019-04-18 | Resolved: 2022-07-28

## 2022-07-28 PROBLEM — M54.2 NECK PAIN: Status: RESOLVED | Noted: 2020-06-17 | Resolved: 2022-07-28

## 2022-07-28 PROBLEM — R07.1 CHEST PAIN ON BREATHING: Status: RESOLVED | Noted: 2020-07-17 | Resolved: 2022-07-28

## 2022-07-28 PROBLEM — E55.9 VITAMIN D DEFICIENCY: Status: ACTIVE | Noted: 2022-07-28

## 2022-07-28 PROBLEM — K52.9 GASTROENTERITIS: Status: RESOLVED | Noted: 2018-07-03 | Resolved: 2022-07-28

## 2022-07-28 NOTE — PROGRESS NOTES
This is an Initial Medicare Annual Wellness Exam (AWV) (Performed 12 months after IPPE or effective date of Medicare Part B enrollment, Once in a lifetime)    I have reviewed the patient's medical history in detail and updated the computerized patient record. She presents today for Medicare initial annual wellness examination and screening questionnaire. She is also in follow-up of her multiple medical problems include hyperlipidemia, hypothyroidism, cervical radiculopathy status post cervical spinal fusion with prior cervical spinal stenosis noted as well, prior elevated liver enzymes, insomnia, vitamin D deficiency, CKD stage III, DJD and other multiple medical problems. She is taking her medications trying to follow her diet try and remain physically active. She currently denies any chest pain, shortness of breath, palpitations, PND, orthopnea or other cardiac or respiratory complaints. She has no current GI or  complaints. She has no headaches, dizziness or neurologic complaints. She has no current active arthritic complaints and has no other complaints on complete review of systems other than she is noted an area on her right thumb that has gotten a little larger that on my exam looks more like a ganglion cyst.      Assessment/Plan   Education and counseling provided:  Are appropriate based on today's review and evaluation    ASSESSMENT:   1. Mixed hyperlipidemia    2. Primary osteoarthritis involving multiple joints    3. Stage 3 chronic kidney disease, unspecified whether stage 3a or 3b CKD (HCC)    4. Age-related osteoporosis without current pathological fracture    5. Vitamin D deficiency    6. Acquired hypothyroidism    7. Cervical spinal stenosis    8. S/P cervical spinal fusion    9. Elevated liver enzymes    10. Primary insomnia    11. Depression, major, recurrent, moderate ([address]    12. Alcohol screening    13. Medicare annual wellness visit, initial    15.  Ganglion cyst of finger Impression  1. Hyperlipidemia prior lab reviewed repeat status pending I will adjust if needed. 2.  DJD that is stable  3. CKD stage III repeat status pending  4. Osteoporosis reviewed prior bone density  5   Vitamin D deficiency repeat status pending  6. Hypothyroidism repeat status pending  7. Cervical spinal stenosis status post cervical spinal fusion with previously noted previous herniated disc  8. Prior elevated liver enzymes repeat status pending  9. Insomnia controlled  10. Depression that is stable  11. Annual alcohol screening is done and she drinks maybe 1 or 2 glasses of wine on weekend days but none during the week. We did spend 8 minutes discussing complications of excessive alcohol intake in females who averaged more than 1 drink per day with increased cardiovascular risk and increased risk of liver disease and other GI effects. 12 Ganglion cyst of the right thumb at this point I would tend to do nothing and see if it would go away on its own but we can drain it if we need to if he gets larger. Medicare initial annual wellness examination screening questionnaires completed today. The results were reviewed with her and her questions were answered. Lifestyle recommendations and modifications discussed and made. I will call with lab results and make further recommendations or adjustments if necessary. Follow-up in 3 months or sooner if there is a problem. PLAN:  .  Orders Placed This Encounter    CBC WITH AUTOMATED DIFF    METABOLIC PANEL, COMPREHENSIVE    LIPID PANEL    CK    T4 (THYROXINE)    TSH 3RD GENERATION    URINALYSIS W/ REFLEX CULTURE    VITAMIN D, 25 HYDROXY         ATTENTION:   This medical record was transcribed using an electronic medical records system. Although proofread, it may and can contain electronic and spelling errors. Other human spelling and other errors may be present. Corrections may be executed at a later time.   Please feel free to contact us for any clarifications as needed. Follow-up and Dispositions    Return in about 3 months (around 10/29/2022). Robin Randhawa MD        Depression Risk Factor Screening     3 most recent PHQ Screens 7/29/2022   Little interest or pleasure in doing things Not at all   Feeling down, depressed, irritable, or hopeless Not at all   Total Score PHQ 2 0   Trouble falling or staying asleep, or sleeping too much -   Feeling tired or having little energy -   Poor appetite, weight loss, or overeating -   Feeling bad about yourself - or that you are a failure or have let yourself or your family down -   Trouble concentrating on things such as school, work, reading, or watching TV -   Moving or speaking so slowly that other people could have noticed; or the opposite being so fidgety that others notice -   Thoughts of being better off dead, or hurting yourself in some way -   PHQ 9 Score -   How difficult have these problems made it for you to do your work, take care of your home and get along with others -       Alcohol & Drug Abuse Risk Screen    Do you average more than 1 drink per night or more than 7 drinks a week:  No    On any one occasion in the past three months have you have had more than 3 drinks containing alcohol:  No          Functional Ability and Level of Safety    Hearing: Hearing is good. Activities of Daily Living: The home contains: no safety equipment. Patient does total self care     Ambulation: with no difficulty      Fall Risk:  Fall Risk Assessment, last 12 mths 7/29/2022   Able to walk? Yes   Fall in past 12 months? 0   Do you feel unsteady? 0   Are you worried about falling 0   Number of falls in past 12 months -   Fall with injury?  -      Abuse Screen:  Patient is not abused       Cognitive Screening    Has your family/caregiver stated any concerns about your memory: no     Cognitive Screening: Normal - MMSE (Mini Mental Status Exam)      ROS:    Constitutional: She denies fevers, weight loss, sweats. Eyes: No blurred or double vision. ENT: No difficulty with swallowing, taste, speech or smell. NECK: no stiffness swelling or lymph node enlargement  Respiratory: No cough wheezing or shortness of breath. Cardiovascular: Denies chest pain, palpitations, unexplained indigestion or syncope. Breast: She has noted no masses or lumps and no discharge or axillary swelling  Gastrointestinal:  No changes in bowel movements, no abdominal pain, no bloating. Genitourinary: No discharge or abnormal bleeding or pain  Extremities: No joint pain, stiffness or swelling. Neurological:  No numbness, tingling, burring paresthesias or loss of motor strength. No syncope, dizziness or frequent headache  Skin:  No recent rashes or mole changes. Psychiatric/Behavioral:  Negative for depression. The patient is not nervous/anxious. HEMATOLOGIC: no easy bruising or bleeding gums  Endocrine: no sweats of urinary frequency or excessive thirst       Vitals:    07/29/22 0859   BP: 112/72   Pulse: 71   Resp: 17   Temp: 98.1 °F (36.7 °C)   TempSrc: Oral   SpO2: 98%   Weight: 120 lb 1.6 oz (54.5 kg)   Height: 4' 10\" (1.473 m)   PainSc:   0 - No pain        PHYSICAL EXAM:    General appearance - alert, well appearing, and in no distress  Mental status - alert, oriented to person, place, and time  HEENT:  Ears - bilateral TM's and external ear canals clear  Eyes - pupillary responses were normal.  Extraocular muscle function intact. Lids and conjunctiva not injected. Fundoscopic exam revealed sharp disc margins. eye movements intact  Pharynx- clear with teeth in good repair. No masses were noted  Neck - supple without thyromegaly or burit. No JVD noted  Lungs - clear to auscultation and percussion  Cardiac- normal rate, regular rhythm without murmurs. PMI not displaced. No gallop, rub or click  Breast: deferred to GYN  Abdomen - flat, soft, non-tender without palpable organomegaly or mass.   No pulsatile mass was felt, and not bruit was heard. Bowel sounds were active   Female - deferred to GYN  Rectal - deferred to GYN  Extremities -  no clubbing cyanosis or edema  Lymphatics - no palpable lymphadenopathy, no hepatosplenomegaly  Peripheral vascular - Dorsalis pedis and posterior tibial pulses felt without difficulty  Skin - no rash or unusual mole change noted  Neurological - Cranial nerves II-XII grossly intact. Motor strength 5/5. DTR's 2+ and symmetric.   Station and gait normal  Back exam - full range of motion, no tenderness, palpable spasm or pain on motion  Musculoskeletal - no joint tenderness, deformity or swelling  Hematologic: no purpura, petechiae or bruising   Health Maintenance Due     Health Maintenance Due   Topic Date Due    DTaP/Tdap/Td series (1 - Tdap) Never done    Shingrix Vaccine Age 49> (1 of 2) Never done    COVID-19 Vaccine (4 - Booster for Moderna series) 05/12/2022       Patient Care Team   Patient Care Team:  Clarence Valles MD as PCP - General (Internal Medicine Physician)  Clarence Valles MD as PCP - REHABILITATION [facility] Empaneled Provider    History     Patient Active Problem List   Diagnosis Code    Bilateral carpal tunnel syndrome G56.03    Cervical radiculopathy due to degenerative joint disease of spine M47.22    Acquired hypothyroidism E03.9    Alcohol screening Z13.39    Age-related osteoporosis without current pathological fracture M81.0    Primary osteoarthritis involving multiple joints M15.9    Stage 3 chronic kidney disease ([facility] [address] N18.30    Mixed hyperlipidemia E78.2    Elevated liver enzymes R74.8    Common bile duct dilation K83.8    Cervical spinal stenosis M48.02    Degenerative disc disease, cervical M50.30    Herniation of cervical intervertebral disc M50.20    S/P cervical spinal fusion Z98.1    Acute cystitis N30.00    Depression, major, recurrent, moderate (HCC) F33.1    Primary insomnia F51.01    Headache R51.9    Acute midline low back pain without sciatica M54.50    Primary osteoarthritis of left hip M16.12    Vitamin D deficiency E55.9    Medicare annual wellness visit, initial Z00.00    Ganglion cyst of finger M67.449     Past Medical History:   Diagnosis Date    Adverse effect of anesthesia     Pt states her daughter had an Anaphylactic reaction to Anesthesia    Arthritis     Back pain     Hypertension     Single episode    Menopause     Neck pain     Psychiatric disorder     Depression, Anxiety    Thyroid disease       Past Surgical History:   Procedure Laterality Date    COLONOSCOPY N/A 3/22/2022    COLONOSCOPY performed by Sadie Van MD at [facility] ENDOSCOPY    HX BACK SURGERY      c4-5 fusion    HX ORTHOPAEDIC      Rotator Cuff Repair, Right    HX ORTHOPAEDIC      Carpal Tunnel Repair, Right    [facility] COLON CA SCRN NOT HI RSK IND  3/22/2022          Current Outpatient Medications   Medication Sig Dispense Refill    calcium carbonate (CALCIUM 300 PO) Take 1 Tablet by mouth daily. atorvastatin (LIPITOR) 10 mg tablet Take 1 Tablet by mouth daily. 90 Tablet 3    sertraline (ZOLOFT) 100 mg tablet Take 1 Tablet by mouth daily. 90 Tablet 3    levothyroxine (SYNTHROID) 137 mcg tablet Take 1 Tablet by mouth Daily (before breakfast). 90 Tablet 3    diclofenac EC (VOLTAREN) 75 mg EC tablet TAKE 1 TABLET BY MOUTH TWICE A  Tablet 3    multivitamin (ONE A DAY) tablet Take 1 Tab by mouth daily.        No Known Allergies    Family History   Problem Relation Age of Onset    Breast Cancer Paternal Grandmother         over 48    Stroke Mother     Heart Attack Father     Other Daughter         anaphylactic reaction to anesthesia     Social History     Tobacco Use    Smoking status: Never    Smokeless tobacco: Never   Substance Use Topics    Alcohol use: Yes     Comment: 3 Drinks per month       Aneta Shah MD

## 2022-07-29 ENCOUNTER — OFFICE VISIT (OUTPATIENT)
Dept: INTERNAL MEDICINE CLINIC | Age: 67
End: 2022-07-29
Payer: MEDICARE

## 2022-07-29 VITALS
TEMPERATURE: 98.1 F | HEART RATE: 71 BPM | OXYGEN SATURATION: 98 % | WEIGHT: 120.1 LBS | SYSTOLIC BLOOD PRESSURE: 112 MMHG | HEIGHT: 58 IN | RESPIRATION RATE: 17 BRPM | DIASTOLIC BLOOD PRESSURE: 72 MMHG | BODY MASS INDEX: 25.21 KG/M2

## 2022-07-29 DIAGNOSIS — M15.9 PRIMARY OSTEOARTHRITIS INVOLVING MULTIPLE JOINTS: ICD-10-CM

## 2022-07-29 DIAGNOSIS — M67.449 GANGLION CYST OF FINGER: ICD-10-CM

## 2022-07-29 DIAGNOSIS — F33.1 DEPRESSION, MAJOR, RECURRENT, MODERATE (HCC): ICD-10-CM

## 2022-07-29 DIAGNOSIS — N18.30 STAGE 3 CHRONIC KIDNEY DISEASE, UNSPECIFIED WHETHER STAGE 3A OR 3B CKD (HCC): ICD-10-CM

## 2022-07-29 DIAGNOSIS — M81.0 AGE-RELATED OSTEOPOROSIS WITHOUT CURRENT PATHOLOGICAL FRACTURE: ICD-10-CM

## 2022-07-29 DIAGNOSIS — R74.8 ELEVATED LIVER ENZYMES: ICD-10-CM

## 2022-07-29 DIAGNOSIS — M48.02 CERVICAL SPINAL STENOSIS: ICD-10-CM

## 2022-07-29 DIAGNOSIS — E78.2 MIXED HYPERLIPIDEMIA: Primary | ICD-10-CM

## 2022-07-29 DIAGNOSIS — E03.9 ACQUIRED HYPOTHYROIDISM: ICD-10-CM

## 2022-07-29 DIAGNOSIS — Z13.39 ALCOHOL SCREENING: ICD-10-CM

## 2022-07-29 DIAGNOSIS — Z98.1 S/P CERVICAL SPINAL FUSION: ICD-10-CM

## 2022-07-29 DIAGNOSIS — E55.9 VITAMIN D DEFICIENCY: ICD-10-CM

## 2022-07-29 DIAGNOSIS — F51.01 PRIMARY INSOMNIA: ICD-10-CM

## 2022-07-29 DIAGNOSIS — Z00.00 MEDICARE ANNUAL WELLNESS VISIT, INITIAL: ICD-10-CM

## 2022-07-29 LAB
APPEARANCE UR: CLEAR
BACTERIA URNS QL MICRO: NEGATIVE /HPF
BILIRUB UR QL: NEGATIVE
COLOR UR: ABNORMAL
EPITH CASTS URNS QL MICRO: ABNORMAL /LPF
GLUCOSE UR STRIP.AUTO-MCNC: NEGATIVE MG/DL
HGB UR QL STRIP: NEGATIVE
HYALINE CASTS URNS QL MICRO: ABNORMAL /LPF (ref 0–5)
KETONES UR QL STRIP.AUTO: NEGATIVE MG/DL
LEUKOCYTE ESTERASE UR QL STRIP.AUTO: ABNORMAL
NITRITE UR QL STRIP.AUTO: NEGATIVE
PH UR STRIP: 7 [PH] (ref 5–8)
PROT UR STRIP-MCNC: NEGATIVE MG/DL
RBC #/AREA URNS HPF: ABNORMAL /HPF (ref 0–5)
SP GR UR REFRACTOMETRY: 1.01 (ref 1–1.03)
UA: UC IF INDICATED,UAUC: ABNORMAL
UROBILINOGEN UR QL STRIP.AUTO: 0.2 EU/DL (ref 0.2–1)
WBC URNS QL MICRO: ABNORMAL /HPF (ref 0–4)

## 2022-07-29 PROCEDURE — G9899 SCRN MAM PERF RSLTS DOC: HCPCS | Performed by: INTERNAL MEDICINE

## 2022-07-29 PROCEDURE — G8427 DOCREV CUR MEDS BY ELIG CLIN: HCPCS | Performed by: INTERNAL MEDICINE

## 2022-07-29 PROCEDURE — G0442 ANNUAL ALCOHOL SCREEN 15 MIN: HCPCS | Performed by: INTERNAL MEDICINE

## 2022-07-29 PROCEDURE — 3017F COLORECTAL CA SCREEN DOC REV: CPT | Performed by: INTERNAL MEDICINE

## 2022-07-29 PROCEDURE — 99214 OFFICE O/P EST MOD 30 MIN: CPT | Performed by: INTERNAL MEDICINE

## 2022-07-29 PROCEDURE — 1123F ACP DISCUSS/DSCN MKR DOCD: CPT | Performed by: INTERNAL MEDICINE

## 2022-07-29 PROCEDURE — G0438 PPPS, INITIAL VISIT: HCPCS | Performed by: INTERNAL MEDICINE

## 2022-07-29 PROCEDURE — 1090F PRES/ABSN URINE INCON ASSESS: CPT | Performed by: INTERNAL MEDICINE

## 2022-07-29 PROCEDURE — G8536 NO DOC ELDER MAL SCRN: HCPCS | Performed by: INTERNAL MEDICINE

## 2022-07-29 PROCEDURE — G8417 CALC BMI ABV UP PARAM F/U: HCPCS | Performed by: INTERNAL MEDICINE

## 2022-07-29 PROCEDURE — 1101F PT FALLS ASSESS-DOCD LE1/YR: CPT | Performed by: INTERNAL MEDICINE

## 2022-07-29 NOTE — PROGRESS NOTES
Chief Complaint   Patient presents with    Annual Wellness Visit     Visit Vitals  /72 (BP 1 Location: Left upper arm, BP Patient Position: Sitting, BP Cuff Size: Adult)   Pulse 71   Temp 98.1 °F (36.7 °C) (Oral)   Resp 17   Ht 4' 10\" (1.473 m)   Wt 120 lb 1.6 oz (54.5 kg)   SpO2 98%   BMI 25.10 kg/m²     1. Have you been to the ER, urgent care clinic since your last visit? Hospitalized since your last visit? No    2. Have you seen or consulted any other health care providers outside of the [address] [place] [address] since your last visit? Include any pap smears or colon screening.  No      Depression Risk Factor Screening:     3 most recent PHQ Screens 7/29/2022   Little interest or pleasure in doing things Not at all   Feeling down, depressed, irritable, or hopeless Not at all   Total Score PHQ 2 0   Trouble falling or staying asleep, or sleeping too much -   Feeling tired or having little energy -   Poor appetite, weight loss, or overeating -   Feeling bad about yourself - or that you are a failure or have let yourself or your family down -   Trouble concentrating on things such as school, work, reading, or watching TV -   Moving or speaking so slowly that other people could have noticed; or the opposite being so fidgety that others notice -   Thoughts of being better off dead, or hurting yourself in some way -   PHQ 9 Score -   How difficult have these problems made it for you to do your work, take care of your home and get along with others -       Functional Ability and Level of Safety:     Activities of Daily Living  ADL Assessment 7/29/2022   Feeding yourself No Help Needed   Getting from bed to chair No Help Needed   Getting dressed No Help Needed   Bathing or showering No Help Needed   Walk across the room (includes cane/walker) No Help Needed   Using the telphone No Help Needed   Taking your medications No Help Needed   Preparing meals No Help Needed   Managing money (expenses/bills) No Help Needed Moderately strenuous housework (laundry) No Help Needed   Shopping for personal items (toiletries/medicines) No Help Needed   Shopping for groceries No Help Needed   Driving No Help Needed   Climbing a flight of stairs No Help Needed   Getting to places beyond walking distances No Help Needed       Fall Risk  Fall Risk Assessment, last 12 mths 7/29/2022   Able to walk? Yes   Fall in past 12 months? 0   Do you feel unsteady? 0   Are you worried about falling 0   Number of falls in past 12 months -   Fall with injury? -       Abuse Screen  Abuse Screening Questionnaire 7/29/2022   Do you ever feel afraid of your partner? N   Are you in a relationship with someone who physically or mentally threatens you? N   Is it safe for you to go home?  Y         Patient Care Team   Patient Care Team:  Rissa Rice MD as PCP - General (Internal Medicine Physician)  Rissa Rice MD as PCP - REHABILITATION [facility] Empaneled Provider

## 2022-07-30 LAB
25(OH)D3 SERPL-MCNC: 60.6 NG/ML (ref 30–100)
ALBUMIN SERPL-MCNC: 4.2 G/DL (ref 3.5–5)
ALBUMIN/GLOB SERPL: 1.3 {RATIO} (ref 1.1–2.2)
ALP SERPL-CCNC: 57 U/L (ref 45–117)
ALT SERPL-CCNC: 39 U/L (ref 12–78)
ANION GAP SERPL CALC-SCNC: 5 MMOL/L (ref 5–15)
AST SERPL-CCNC: 31 U/L (ref 15–37)
BASOPHILS # BLD: 0.1 K/UL (ref 0–0.1)
BASOPHILS NFR BLD: 1 % (ref 0–1)
BILIRUB SERPL-MCNC: 0.5 MG/DL (ref 0.2–1)
BUN SERPL-MCNC: 23 MG/DL (ref 6–20)
BUN/CREAT SERPL: 24 (ref 12–20)
CALCIUM SERPL-MCNC: 9.2 MG/DL (ref 8.5–10.1)
CHLORIDE SERPL-SCNC: 106 MMOL/L (ref 97–108)
CHOLEST SERPL-MCNC: 198 MG/DL
CK SERPL-CCNC: 174 U/L (ref 26–192)
CO2 SERPL-SCNC: 28 MMOL/L (ref 21–32)
CREAT SERPL-MCNC: 0.96 MG/DL (ref 0.55–1.02)
DIFFERENTIAL METHOD BLD: ABNORMAL
EOSINOPHIL # BLD: 0.6 K/UL (ref 0–0.4)
EOSINOPHIL NFR BLD: 10 % (ref 0–7)
ERYTHROCYTE [DISTWIDTH] IN BLOOD BY AUTOMATED COUNT: 13.5 % (ref 11.5–14.5)
GLOBULIN SER CALC-MCNC: 3.2 G/DL (ref 2–4)
GLUCOSE SERPL-MCNC: 102 MG/DL (ref 65–100)
HCT VFR BLD AUTO: 39 % (ref 35–47)
HDLC SERPL-MCNC: 103 MG/DL
HDLC SERPL: 1.9 {RATIO} (ref 0–5)
HGB BLD-MCNC: 12.8 G/DL (ref 11.5–16)
IMM GRANULOCYTES # BLD AUTO: 0 K/UL (ref 0–0.04)
IMM GRANULOCYTES NFR BLD AUTO: 0 % (ref 0–0.5)
LDLC SERPL CALC-MCNC: 81.2 MG/DL (ref 0–100)
LYMPHOCYTES # BLD: 2.5 K/UL (ref 0.8–3.5)
LYMPHOCYTES NFR BLD: 41 % (ref 12–49)
MCH RBC QN AUTO: 32.3 PG (ref 26–34)
MCHC RBC AUTO-ENTMCNC: 32.8 G/DL (ref 30–36.5)
MCV RBC AUTO: 98.5 FL (ref 80–99)
MONOCYTES # BLD: 0.5 K/UL (ref 0–1)
MONOCYTES NFR BLD: 8 % (ref 5–13)
NEUTS SEG # BLD: 2.4 K/UL (ref 1.8–8)
NEUTS SEG NFR BLD: 40 % (ref 32–75)
NRBC # BLD: 0 K/UL (ref 0–0.01)
NRBC BLD-RTO: 0 PER 100 WBC
PLATELET # BLD AUTO: 252 K/UL (ref 150–400)
PMV BLD AUTO: 11.1 FL (ref 8.9–12.9)
POTASSIUM SERPL-SCNC: 4.5 MMOL/L (ref 3.5–5.1)
PROT SERPL-MCNC: 7.4 G/DL (ref 6.4–8.2)
RBC # BLD AUTO: 3.96 M/UL (ref 3.8–5.2)
SODIUM SERPL-SCNC: 139 MMOL/L (ref 136–145)
T4 SERPL-MCNC: 8.2 UG/DL (ref 4.8–13.9)
TRIGL SERPL-MCNC: 69 MG/DL (ref ?–150)
TSH SERPL DL<=0.05 MIU/L-ACNC: 15.9 UIU/ML (ref 0.36–3.74)
VLDLC SERPL CALC-MCNC: 13.8 MG/DL
WBC # BLD AUTO: 6 K/UL (ref 3.6–11)

## 2022-08-02 RX ORDER — LEVOTHYROXINE SODIUM 150 UG/1
150 TABLET ORAL
Qty: 90 TABLET | Refills: 1 | Status: SHIPPED | OUTPATIENT
Start: 2022-08-02

## 2022-08-02 NOTE — TELEPHONE ENCOUNTER
Per Dr. Anlia Torres pts TSH is elevated and increase Levothyroxine to 150 mcg daily  Rx was sent to [facility]    PCP: Mariusz Valdes MD    Last appt: 7/29/2022  Future Appointments   Date Time Provider Bonnie Jo   11/8/2022  9:00 AM Mariusz Valdes MD [facility] AMB       Last refilled:-    Requested Prescriptions     Pending Prescriptions Disp Refills    levothyroxine (SYNTHROID) 150 mcg tablet 90 Tablet 1     Sig: Take 1 Tablet by mouth Daily (before breakfast).

## 2022-08-02 NOTE — PROGRESS NOTES
Called and spoke to patient  Two pt identifiers confirmed  Informed patient per Dr. Monae Alexander that labs are ok except TSH is elevated and to increase synthroid to 150 mcg daily  [facility] rx [facility]  Also informed patient per Dr. Monae Alexander that cholesterol and LDL has improved  Patient verbalized understanding of information discussed  with no further questions at this time.

## 2022-08-27 PROBLEM — Z00.00 MEDICARE ANNUAL WELLNESS VISIT, INITIAL: Status: RESOLVED | Noted: 2022-07-28 | Resolved: 2022-08-27

## 2022-11-08 ENCOUNTER — OFFICE VISIT (OUTPATIENT)
Dept: INTERNAL MEDICINE CLINIC | Age: 67
End: 2022-11-08
Payer: MEDICARE

## 2022-11-08 VITALS
HEART RATE: 75 BPM | WEIGHT: 110 LBS | DIASTOLIC BLOOD PRESSURE: 70 MMHG | BODY MASS INDEX: 23.09 KG/M2 | HEIGHT: 58 IN | OXYGEN SATURATION: 97 % | SYSTOLIC BLOOD PRESSURE: 102 MMHG | TEMPERATURE: 98.5 F

## 2022-11-08 DIAGNOSIS — E78.2 MIXED HYPERLIPIDEMIA: Primary | ICD-10-CM

## 2022-11-08 DIAGNOSIS — R74.8 ELEVATED LIVER ENZYMES: ICD-10-CM

## 2022-11-08 DIAGNOSIS — Z23 NEEDS FLU SHOT: ICD-10-CM

## 2022-11-08 DIAGNOSIS — N18.30 STAGE 3 CHRONIC KIDNEY DISEASE, UNSPECIFIED WHETHER STAGE 3A OR 3B CKD (HCC): ICD-10-CM

## 2022-11-08 DIAGNOSIS — M15.9 PRIMARY OSTEOARTHRITIS INVOLVING MULTIPLE JOINTS: ICD-10-CM

## 2022-11-08 LAB
ALBUMIN SERPL-MCNC: 4.3 G/DL (ref 3.5–5)
ALBUMIN/GLOB SERPL: 1.3 {RATIO} (ref 1.1–2.2)
ALP SERPL-CCNC: 58 U/L (ref 45–117)
ALT SERPL-CCNC: 29 U/L (ref 12–78)
ANION GAP SERPL CALC-SCNC: 4 MMOL/L (ref 5–15)
AST SERPL-CCNC: 20 U/L (ref 15–37)
BILIRUB SERPL-MCNC: 0.4 MG/DL (ref 0.2–1)
BUN SERPL-MCNC: 17 MG/DL (ref 6–20)
BUN/CREAT SERPL: 19 (ref 12–20)
CALCIUM SERPL-MCNC: 9.1 MG/DL (ref 8.5–10.1)
CHLORIDE SERPL-SCNC: 104 MMOL/L (ref 97–108)
CHOLEST SERPL-MCNC: 283 MG/DL
CK SERPL-CCNC: 147 U/L (ref 26–192)
CO2 SERPL-SCNC: 28 MMOL/L (ref 21–32)
CREAT SERPL-MCNC: 0.88 MG/DL (ref 0.55–1.02)
GLOBULIN SER CALC-MCNC: 3.3 G/DL (ref 2–4)
GLUCOSE SERPL-MCNC: 98 MG/DL (ref 65–100)
HDLC SERPL-MCNC: 99 MG/DL
HDLC SERPL: 2.9 {RATIO} (ref 0–5)
LDLC SERPL CALC-MCNC: 169.6 MG/DL (ref 0–100)
POTASSIUM SERPL-SCNC: 4 MMOL/L (ref 3.5–5.1)
PROT SERPL-MCNC: 7.6 G/DL (ref 6.4–8.2)
SODIUM SERPL-SCNC: 136 MMOL/L (ref 136–145)
TRIGL SERPL-MCNC: 72 MG/DL (ref ?–150)
VLDLC SERPL CALC-MCNC: 14.4 MG/DL

## 2022-11-08 PROCEDURE — 99214 OFFICE O/P EST MOD 30 MIN: CPT

## 2022-11-08 PROCEDURE — G9899 SCRN MAM PERF RSLTS DOC: HCPCS

## 2022-11-08 PROCEDURE — 1123F ACP DISCUSS/DSCN MKR DOCD: CPT

## 2022-11-08 PROCEDURE — G8427 DOCREV CUR MEDS BY ELIG CLIN: HCPCS

## 2022-11-08 PROCEDURE — G8420 CALC BMI NORM PARAMETERS: HCPCS

## 2022-11-08 PROCEDURE — G8536 NO DOC ELDER MAL SCRN: HCPCS

## 2022-11-08 PROCEDURE — 90694 VACC AIIV4 NO PRSRV 0.5ML IM: CPT

## 2022-11-08 PROCEDURE — 1101F PT FALLS ASSESS-DOCD LE1/YR: CPT

## 2022-11-08 PROCEDURE — 3017F COLORECTAL CA SCREEN DOC REV: CPT

## 2022-11-08 PROCEDURE — G9717 DOC PT DX DEP/BP F/U NT REQ: HCPCS

## 2022-11-08 PROCEDURE — 1090F PRES/ABSN URINE INCON ASSESS: CPT

## 2022-11-08 PROCEDURE — G0008 ADMIN INFLUENZA VIRUS VAC: HCPCS

## 2022-11-08 NOTE — PROGRESS NOTES
Eli Garcia is a 75 y.o. female     Chief Complaint   Patient presents with    Chronic Kidney Disease     3 month follow up    Cholesterol Problem       Visit Vitals  /70 (BP 1 Location: Left upper arm, BP Patient Position: Sitting, BP Cuff Size: Adult)   Pulse 75   Temp 98.5 °F (36.9 °C) (Oral)   Ht 4' 10\" (1.473 m)   Wt 110 lb (49.9 kg)   SpO2 97%   BMI 22.99 kg/m²       Health Maintenance Due   Topic Date Due    DTaP/Tdap/Td series (1 - Tdap) Never done    Shingrix Vaccine Age 50> (1 of 2) Never done    COVID-19 Vaccine (4 - Booster for Moderna series) 03/09/2022    Flu Vaccine (1) 08/01/2022         1. \"Have you been to the ER, urgent care clinic since your last visit? Hospitalized since your last visit? \" No    2. \"Have you seen or consulted any other health care providers outside of the [address] [place] [address] since your last visit? \" No     3. For patients aged 39-70: Has the patient had a colonoscopy / FIT/ Cologuard? Yes - no Care Gap present      If the patient is female:    4. For patients aged 41-77: Has the patient had a mammogram within the past 2 years? Yes - no Care Gap present      5. For patients aged 21-65: Has the patient had a pap smear?  NA - based on age or sex

## 2022-11-08 NOTE — PROGRESS NOTES
Chief Complaint   Patient presents with    Chronic Kidney Disease     3 month follow up    Cholesterol Problem       SUBJECTIVE:    Rubina Toledo is a 75 y.o. female who returns in follow-up for medical problems include hypertension, hyperlipidemia, osteoporosis, DJD, anxiety depression which is controlled and other medical problems. She is taking her medications trying to follow her diet and remains physically active. She currently denies any chest pain, shortness of breath or cardiac respiratory complaints. There are no GI or  complaints. There are no headaches, dizziness or neurologic complaints. There are no current active arthritic complaints and there are no other complaints on complete review of systems. Current Outpatient Medications   Medication Sig Dispense Refill    levothyroxine (SYNTHROID) 150 mcg tablet Take 1 Tablet by mouth Daily (before breakfast). 90 Tablet 1    calcium carbonate (CALCIUM 300 PO) Take 1 Tablet by mouth daily. atorvastatin (LIPITOR) 10 mg tablet Take 1 Tablet by mouth daily. 90 Tablet 3    sertraline (ZOLOFT) 100 mg tablet Take 1 Tablet by mouth daily. 90 Tablet 3    diclofenac EC (VOLTAREN) 75 mg EC tablet TAKE 1 TABLET BY MOUTH TWICE A  Tablet 3    multivitamin (ONE A DAY) tablet Take 1 Tab by mouth daily.        Past Medical History:   Diagnosis Date    Adverse effect of anesthesia     Pt states her daughter had an Anaphylactic reaction to Anesthesia    Arthritis     Back pain     Hypertension     Single episode    Menopause     Neck pain     Psychiatric disorder     Depression, Anxiety    Thyroid disease      Past Surgical History:   Procedure Laterality Date    COLONOSCOPY N/A 3/22/2022    COLONOSCOPY performed by Ben Gleason MD at [facility] ENDOSCOPY    HX BACK SURGERY      c4-5 fusion    HX ORTHOPAEDIC      Rotator Cuff Repair, Right    HX ORTHOPAEDIC      Carpal Tunnel Repair, Right    [place] COLON CA SCRN NOT HI [facility]SK IND  3/22/2022 No Known Allergies    REVIEW OF SYSTEMS:  General: negative for - chills or fever, or weight loss or gain  ENT: negative for - headaches, nasal congestion or tinnitus  Eyes: no blurred or visual changes  Neck: No stiffness or swollen nodes  Respiratory: negative for - cough, hemoptysis, shortness of breath or wheezing  Cardiovascular : negative for - chest pain, edema, palpitations or shortness of breath  Gastrointestinal: negative for - abdominal pain, blood in stools, heartburn or nausea/vomiting  Genito-Urinary: no dysuria, trouble voiding, or hematuria  Musculoskeletal: negative for - gait disturbance, joint pain, joint stiffness or joint swelling  Neurological: no TIA or stroke symptoms  Hematologic: no bruises, no bleeding  Lymphatic: no swollen glands  Integument: no lumps, mole changes, nail changes or rash  Endocrine:no malaise/lethargy poly uria or polydipsia or unexpected weight changes        Social History     Socioeconomic History    Marital status:    Tobacco Use    Smoking status: Never    Smokeless tobacco: Never   Vaping Use    Vaping Use: Never used   Substance and Sexual Activity    Alcohol use: Yes     Comment: 3 Drinks per month    Drug use: No    Sexual activity: Not Currently     Family History   Problem Relation Age of Onset    Breast Cancer Paternal Grandmother         over 48    Stroke Mother     Heart Attack Father     Other Daughter         anaphylactic reaction to anesthesia       OBJECTIVE:     Visit Vitals  /70 (BP 1 Location: Left upper arm, BP Patient Position: Sitting, BP Cuff Size: Adult)   Pulse 75   Temp 98.5 °F (36.9 °C) (Oral)   Ht 4' 10\" (1.473 m)   Wt 110 lb (49.9 kg)   SpO2 97%   BMI 22.99 kg/m²     CONSTITUTIONAL:   well nourished, appears age appropriate  EYES: sclera anicteric, PERRL, EOMI  ENMT:nares clear, moist mucous membranes, pharynx clear  NECK: supple.  Thyroid normal, No JVD or bruits  RESPIRATORY: Chest: clear to ascultation and percussion, normal inspiratory effort  CARDIOVASCULAR: Heart: regular rate and rhythm no murmurs, rubs or gallops, PMI not displaced, No thrills, no peripheral edema  GASTROINTESTINAL: Abdomen: non distended, soft, non tender, bowel sounds normal  HEMATOLOGIC: no purpura, petechiae or bruising  LYMPHATIC: No lymph node enlargemant  MUSCULOSKELETAL: Extremities: no active synovitis, pulse 1+   INTEGUMENT: No unusual rashes or suspicious skin lesions noted. Nails appear normal.  PERIPHERAL VASCULAR: normal pulses femoral, PT and DP  NEUROLOGIC: non-focal exam, A & O X 3  PSYCHIATRIC:, appropriate affect     ASSESSMENT:   1. Mixed hyperlipidemia    2. Primary osteoarthritis involving multiple joints    3. Stage 3 chronic kidney disease, unspecified whether stage 3a or 3b CKD (HCC)    4. Elevated liver enzymes    5. Needs flu shot      Impression  1. Hypertension is controlled so continue current therapy reviewed with her. 2.  Hyperlipidemia prior lab reviewed repeat status pending  3. DJD stable    4 CKD stage III repeat status pending  5 elevated liver enzymes previously repeat status pending  Flu shot given today. Follow-up again 3 months or sooner if there is a problem. I will call with lab results in the interim. PLAN:  .  Orders Placed This Encounter    Influenza, FLUAD, (age 72 y+), IM, PF, 0.5 mL    METABOLIC PANEL, COMPREHENSIVE    LIPID PANEL    CK         ATTENTION:   This medical record was transcribed using an electronic medical records system. Although proofread, it may and can contain electronic and spelling errors. Other human spelling and other errors may be present. Corrections may be executed at a later time. Please feel free to contact us for any clarifications as needed. Follow-up and Dispositions    Return in about 3 months (around 2/8/2023). No results found for any visits on 11/08/22.     Brea Bailey MD    The patient verbalized understanding of the problems and plans as explained.

## 2022-11-11 RX ORDER — ATORVASTATIN CALCIUM 20 MG/1
20 TABLET, FILM COATED ORAL DAILY
Qty: 90 TABLET | Refills: 3 | Status: SHIPPED | OUTPATIENT
Start: 2022-11-11

## 2022-11-11 NOTE — PROGRESS NOTES
Results have been reviewed and abnormal results noted. Please see documentation in new EMR. Please call the patient regarding her abnormal result. Cholesterol and LDL are higher so increase Lipitor to 20 mg daily. Discussed results with patient. Rx sent to patient's pharmacy. F/up as scheduled.

## 2022-11-11 NOTE — TELEPHONE ENCOUNTER
RX refill request from the patient/pharmacy. Patient last seen 11- with labs, and next appt. scheduled for 02-  Requested Prescriptions     Pending Prescriptions Disp Refills    atorvastatin (LIPITOR) 20 mg tablet 90 Tablet 3     Sig: Take 1 Tablet by mouth daily. Joseph Mcbride

## 2022-12-15 ENCOUNTER — OFFICE VISIT (OUTPATIENT)
Dept: INTERNAL MEDICINE CLINIC | Age: 67
End: 2022-12-15
Payer: MEDICARE

## 2022-12-15 VITALS
RESPIRATION RATE: 16 BRPM | BODY MASS INDEX: 22.92 KG/M2 | DIASTOLIC BLOOD PRESSURE: 78 MMHG | OXYGEN SATURATION: 97 % | HEART RATE: 69 BPM | HEIGHT: 58 IN | SYSTOLIC BLOOD PRESSURE: 134 MMHG | TEMPERATURE: 98.3 F | WEIGHT: 109.2 LBS

## 2022-12-15 DIAGNOSIS — M54.50 ACUTE MIDLINE LOW BACK PAIN WITHOUT SCIATICA: Primary | ICD-10-CM

## 2022-12-15 DIAGNOSIS — M15.9 PRIMARY OSTEOARTHRITIS INVOLVING MULTIPLE JOINTS: ICD-10-CM

## 2022-12-15 PROCEDURE — 1101F PT FALLS ASSESS-DOCD LE1/YR: CPT | Performed by: INTERNAL MEDICINE

## 2022-12-15 PROCEDURE — G8427 DOCREV CUR MEDS BY ELIG CLIN: HCPCS | Performed by: INTERNAL MEDICINE

## 2022-12-15 PROCEDURE — 99213 OFFICE O/P EST LOW 20 MIN: CPT | Performed by: INTERNAL MEDICINE

## 2022-12-15 PROCEDURE — G8420 CALC BMI NORM PARAMETERS: HCPCS | Performed by: INTERNAL MEDICINE

## 2022-12-15 PROCEDURE — 1090F PRES/ABSN URINE INCON ASSESS: CPT | Performed by: INTERNAL MEDICINE

## 2022-12-15 PROCEDURE — G9717 DOC PT DX DEP/BP F/U NT REQ: HCPCS | Performed by: INTERNAL MEDICINE

## 2022-12-15 PROCEDURE — G9899 SCRN MAM PERF RSLTS DOC: HCPCS | Performed by: INTERNAL MEDICINE

## 2022-12-15 PROCEDURE — 3017F COLORECTAL CA SCREEN DOC REV: CPT | Performed by: INTERNAL MEDICINE

## 2022-12-15 PROCEDURE — G8536 NO DOC ELDER MAL SCRN: HCPCS | Performed by: INTERNAL MEDICINE

## 2022-12-15 PROCEDURE — 1123F ACP DISCUSS/DSCN MKR DOCD: CPT | Performed by: INTERNAL MEDICINE

## 2022-12-15 RX ORDER — CYCLOBENZAPRINE HCL 10 MG
10 TABLET ORAL
Qty: 30 TABLET | Refills: 0 | Status: SHIPPED | OUTPATIENT
Start: 2022-12-15

## 2022-12-15 RX ORDER — PREDNISONE 5 MG/1
TABLET ORAL
Qty: 48 TABLET | Refills: 0 | Status: SHIPPED | OUTPATIENT
Start: 2022-12-15

## 2022-12-15 NOTE — PROGRESS NOTES
Chief Complaint   Patient presents with    Back Pain     X 2 weeks     Visit Vitals  /78 (BP 1 Location: Left upper arm, BP Patient Position: Sitting, BP Cuff Size: Adult)   Pulse 69   Temp 98.3 °F (36.8 °C) (Oral)   Resp 16   Ht 4' 10\" (1.473 m)   Wt 109 lb 3.2 oz (49.5 kg)   SpO2 97%   BMI 22.82 kg/m²     1. Have you been to the ER, urgent care clinic since your last visit? Hospitalized since your last visit? No    2. Have you seen or consulted any other health care providers outside of the [address] [place] [address] since your last visit? Include any pap smears or colon screening.  No

## 2022-12-15 NOTE — PROGRESS NOTES
Subjective:   Karyle Silvas is a 75 y.o. female      Chief Complaint   Patient presents with    Back Pain     X 2 weeks        History of present illness: She presents complaints of low back pain is been bothering her for 2 weeks. There is no history of trauma. She does not seem to condyle both times. In the morning when she gets up is so severe she has a hard time straightening up. She notes no falls and no other complaints. There is no bowel or bladder dysfunction.     Patient Active Problem List   Diagnosis Code    Bilateral carpal tunnel syndrome G56.03    Cervical radiculopathy due to degenerative joint disease of spine M47.22    Acquired hypothyroidism E03.9    Alcohol screening Z13.39    Age-related osteoporosis without current pathological fracture M81.0    Primary osteoarthritis involving multiple joints M15.9    Stage 3 chronic kidney disease (HCC) N18.30    Mixed hyperlipidemia E78.2    Elevated liver enzymes R74.8    Common bile duct dilation K83.8    Cervical spinal stenosis M48.02    Degenerative disc disease, cervical M50.30    Herniation of cervical intervertebral disc M50.20    S/P cervical spinal fusion Z98.1    Acute cystitis N30.00    Depression, major, recurrent, moderate (HCC) F33.1    Primary insomnia F51.01    Headache R51.9    Acute midline low back pain without sciatica M54.50    Primary osteoarthritis of left hip M16.12    Vitamin D deficiency E55.9    Ganglion cyst of finger M67.449      Past Medical History:   Diagnosis Date    Adverse effect of anesthesia     Pt states her daughter had an Anaphylactic reaction to Anesthesia    Arthritis     Back pain     Hypertension     Single episode    Menopause     Neck pain     Psychiatric disorder     Depression, Anxiety    Thyroid disease       No Known Allergies   Family History   Problem Relation Age of Onset    Breast Cancer Paternal Grandmother         over 48    Stroke Mother     Heart Attack Father     Other Daughter anaphylactic reaction to anesthesia      Social History     Socioeconomic History    Marital status:      Spouse name: Not on file    Number of children: Not on file    Years of education: Not on file    Highest education level: Not on file   Occupational History    Not on file   Tobacco Use    Smoking status: Never    Smokeless tobacco: Never   Vaping Use    Vaping Use: Never used   Substance and Sexual Activity    Alcohol use: Yes     Comment: 3 Drinks per month    Drug use: No    Sexual activity: Not Currently   Other Topics Concern    Not on file   Social History Narrative    Not on file     Social Determinants of Health     Financial Resource Strain: Not on file   Food Insecurity: Not on file   Transportation Needs: Not on file   Physical Activity: Not on file   Stress: Not on file   Social Connections: Not on file   Intimate Partner Violence: Not on file   Housing Stability: Not on file     Prior to Admission medications    Medication Sig Start Date End Date Taking? Authorizing Provider   predniSONE (STERAPRED) 5 mg dose pack See administration instruction per 5mg dose pack 12/15/22  Yes Trevor Sprague MD   cyclobenzaprine (FLEXERIL) 10 mg tablet Take 1 Tablet by mouth three (3) times daily as needed for Muscle Spasm(s). 12/15/22  Yes Trevor Sprague MD   atorvastatin (LIPITOR) 20 mg tablet Take 1 Tablet by mouth daily. 11/11/22  Yes Trevor Sprague MD   levothyroxine (SYNTHROID) 150 mcg tablet Take 1 Tablet by mouth Daily (before breakfast). 8/2/22  Yes Trevor Sprague MD   calcium carbonate (CALCIUM 300 PO) Take 1 Tablet by mouth daily. Yes Provider, Historical   sertraline (ZOLOFT) 100 mg tablet Take 1 Tablet by mouth daily. 2/23/22  Yes Trevor Sprague MD   diclofenac EC (VOLTAREN) 75 mg EC tablet TAKE 1 TABLET BY MOUTH TWICE A DAY 7/19/21  Yes Trevor Sprague MD   multivitamin (ONE A DAY) tablet Take 1 Tab by mouth daily.    Yes Provider, Historical        Review of Systems              Constitutional:  She denies fever, weight loss, sweats or fatigue. EYES: No blurred or double vision,               ENT: no nasal congestion, no headache or dizziness. No difficulty with               swallowing, taste, speech or smell. Respiratory:  No cough, wheezing or shortness of breath. No sputum production. Cardiac:  Denies chest pain, palpitations, unexplained indigestion, syncope, edema, PND or orthopnea. GI:  No changes in bowel movements, no abdominal pain, no bloating, anorexia, nausea, vomiting or heartburn. :  No frequency or dysuria. Denies incontinence or sexual dysfunction. Extremities:  No joint pain, stiffness or swelling  Back:.no pain or soreness  Skin:  No recent rashes or mole changes. Neurological:  No numbness, tingling, burning paresthesias or loss of motor strength. No syncope, dizziness, frequent headaches or memory loss. Hematologic:  No easy bruising  Lymphatic: No lymph node enlargement    Objective:     Vitals:    12/15/22 1134   BP: 134/78   Pulse: 69   Resp: 16   Temp: 98.3 °F (36.8 °C)   TempSrc: Oral   SpO2: 97%   Weight: 109 lb 3.2 oz (49.5 kg)   Height: 4' 10\" (1.473 m)   PainSc:   3   PainLoc: Back       Body mass index is 22.82 kg/m². Physical Examination:              General Appearance:  Well-developed, well-nourished, no acute distress. HEENT:      Ears:  The TMs and ear canals were clear. Eyes:  The pupillary responses were normal.  Extraocular muscle function intact. Lids and conjunctiva not injected. Funduscopic exam revealed sharp disc margins. Nares: Clear w/o edema or erythema  Pharynx:  Clear with teeth in good repair. No masses were noted. Neck:  Supple without thyromegaly or adenopathy. No JVD noted. No carotid                bruits. Lungs:  Clear to auscultation and percussion. Cardiac:  Regular rate and rhythm without murmur. PMI not displaced. No gallop, rub or click.   Abdominal: Soft, non-tender, no hepata-spleenomegally or masses  Extremities:  No clubbing, cyanosis or edema. Back paraspinal tenderness lumbar region  Skin:  No rash or unusual mole changes noted. Lymph Nodes:  None felt in the cervical, supraclavicular, axillary or inguinal region. Neurological: . DTRs 2+ and symmetric. Station and gait normal.   Hematologic:   No purpura or petechiae        Assessment/Plan:         1. Acute midline low back pain without sciatica    2. Primary osteoarthritis involving multiple joints        Impressions/Plan:  Impression  The 1 midline low back pain related to known degenerative disc disease from previous x-ray done in March which I reviewed today we will put her on a steroid 5 mg 12-day Dosepak. Flexeril 3 times daily as needed for muscle relaxant I told her could cause drowsiness during the daytime. Recheck per previous schedule. Orders Placed This Encounter    predniSONE (STERAPRED) 5 mg dose pack    cyclobenzaprine (FLEXERIL) 10 mg tablet       Follow-up and Dispositions    Return TBD. No results found for any visits on 12/15/22. Sylvia Beltran MD    The patient was given after the visit summary the patient verbalized an understanding of the plans and problems as explained.

## 2022-12-27 ENCOUNTER — TELEPHONE (OUTPATIENT)
Dept: INTERNAL MEDICINE CLINIC | Age: 67
End: 2022-12-27

## 2022-12-31 ENCOUNTER — HOSPITAL ENCOUNTER (EMERGENCY)
Age: 67
Discharge: HOME | End: 2022-12-31
Attending: EMERGENCY MEDICINE
Payer: MEDICARE

## 2022-12-31 VITALS
RESPIRATION RATE: 18 BRPM | OXYGEN SATURATION: 100 % | BODY MASS INDEX: 22.72 KG/M2 | TEMPERATURE: 98.2 F | WEIGHT: 108.25 LBS | SYSTOLIC BLOOD PRESSURE: 97 MMHG | HEART RATE: 86 BPM | DIASTOLIC BLOOD PRESSURE: 79 MMHG | HEIGHT: 58 IN

## 2022-12-31 DIAGNOSIS — R05.1 ACUTE COUGH: Primary | ICD-10-CM

## 2022-12-31 DIAGNOSIS — J18.9 ATYPICAL PNEUMONIA: ICD-10-CM

## 2022-12-31 PROCEDURE — 99283 EMERGENCY DEPT VISIT LOW MDM: CPT

## 2022-12-31 RX ORDER — AZITHROMYCIN 250 MG/1
250 TABLET, FILM COATED ORAL DAILY
Qty: 6 TABLET | Refills: 0 | Status: SHIPPED | OUTPATIENT
Start: 2022-12-31 | End: 2023-01-05

## 2022-12-31 RX ORDER — PREDNISONE 10 MG/1
TABLET ORAL
Qty: 15 TABLET | Refills: 0 | Status: SHIPPED | OUTPATIENT
Start: 2022-12-31 | End: 2023-01-06 | Stop reason: ALTCHOICE

## 2022-12-31 RX ORDER — CODEINE PHOSPHATE AND GUAIFENESIN 10; 100 MG/5ML; MG/5ML
5 SOLUTION ORAL
Qty: 45 ML | Refills: 0 | Status: SHIPPED | OUTPATIENT
Start: 2022-12-31 | End: 2023-01-03

## 2022-12-31 RX ORDER — BENZONATATE 100 MG/1
100 CAPSULE ORAL
Qty: 21 CAPSULE | Refills: 0 | Status: SHIPPED | OUTPATIENT
Start: 2022-12-31 | End: 2023-01-07

## 2022-12-31 NOTE — Clinical Note
[address]  [facility] EMERGENCY DEPT  [address] [place] [id number] [phone]    Work/School Note    Date: 12/31/2022    To Whom It May concern:    Lucy Laguna was seen and treated today in the emergency room by the following provider(s):  Attending Provider: Arpita Russo MD  Physician Assistant: Shital Sow is excused from work/school on 12/31/2022 through 1/2/2023. She is medically clear to return to work/school on 1/3/2023.          Sincerely,          Concha Luna PA

## 2022-12-31 NOTE — ED PROVIDER NOTES
EMERGENCY DEPARTMENT HISTORY AND PHYSICAL EXAM      Pt Name: Areli Salamanac  MRN: [id number]  [place] 1955  Date of evaluation: 12/31/2022  Provider: FRANCY Smith    PCP: Arnoldo Marsh MD  Note Started: 9:37 AM 12/31/22    Shared Not Shared TEJA: I have seen and evaluated the patient. My supervision physician was available for consultation. History of Presenting Illness     Chief Complaint   Patient presents with    Cold Symptoms     Pt reports for the past week she has been having a productive cough, nasal congestion at times but also runny nose, low grade fevers, headaches. Pt reports last week she was seen at [facility] and had a negative covid and flu. Was seen at [facility] ER on Thursday and had a negative chest xray but was not checked for covid or flu at that time. Pt reports her cough is keeping her up at night and she has tried OTC medications with no success. Reports was only given RX for tylenol at [facility] ER. Pt does not a       History Provided By: Patient    HPI: Areli Salamanca, 75 y.o. female with past medical history as documented below, presents to the ED with cough for the past 9 to 10 days. She does report mild productive cough as well as fatigue and low-grade fevers as high as 100 at home. She has been seen at [facility] in the [facility] ED where she received a negative chest x-ray. She has been taking Tylenol with codeine and other over-the-counter cough medications with no relief in her symptoms. She denies any dizziness, chest pain, shortness of breath, nausea, vomiting, abdominal pain, diarrhea, rash, urinary symptoms. She denies any tobacco use    There are no other complaints, changes, or physical findings at this time. PCP: Arnoldo Marsh MD    No current facility-administered medications on file prior to encounter.      Current Outpatient Medications on File Prior to Encounter   Medication Sig Dispense Refill    cyclobenzaprine (FLEXERIL) 10 mg tablet Take 1 Tablet by mouth three (3) times daily as needed for Muscle Spasm(s). 30 Tablet 0    [DISCONTINUED] predniSONE (STERAPRED) 5 mg dose pack See administration instruction per 5mg dose pack 48 Tablet 0    atorvastatin (LIPITOR) 20 mg tablet Take 1 Tablet by mouth daily. 90 Tablet 3    levothyroxine (SYNTHROID) 150 mcg tablet Take 1 Tablet by mouth Daily (before breakfast). 90 Tablet 1    calcium carbonate (CALCIUM 300 PO) Take 1 Tablet by mouth daily. sertraline (ZOLOFT) 100 mg tablet Take 1 Tablet by mouth daily. 90 Tablet 3    diclofenac EC (VOLTAREN) 75 mg EC tablet TAKE 1 TABLET BY MOUTH TWICE A  Tablet 3    multivitamin (ONE A DAY) tablet Take 1 Tab by mouth daily.          Past History     Past Medical History:  Past Medical History:   Diagnosis Date    Adverse effect of anesthesia     Pt states her daughter had an Anaphylactic reaction to Anesthesia    Arthritis     Back pain     Hypertension     Single episode    Menopause     Neck pain     Psychiatric disorder     Depression, Anxiety    Thyroid disease        Past Surgical History:  Past Surgical History:   Procedure Laterality Date    COLONOSCOPY N/A 3/22/2022    COLONOSCOPY performed by Dmitry Philip MD at [facility] ENDOSCOPY    HX BACK SURGERY      c4-5 fusion    HX ORTHOPAEDIC      Rotator Cuff Repair, Right    HX ORTHOPAEDIC      Carpal Tunnel Repair, Right    OK COLON CA SCRN NOT HI [facility] IND  3/22/2022            Family History:  Family History   Problem Relation Age of Onset    Breast Cancer Paternal Grandmother         over 48    Stroke Mother     Heart Attack Father     Other Daughter         anaphylactic reaction to anesthesia       Social History:  Social History     Tobacco Use    Smoking status: Never    Smokeless tobacco: Never   Vaping Use    Vaping Use: Never used   Substance Use Topics    Alcohol use: Yes     Comment: 3 Drinks per month    Drug use: No       Allergies:  No Known Allergies      Review of Systems   Review of Systems   Constitutional:  Positive for fatigue and fever. Negative for chills. HENT:  Negative for congestion and sore throat. Respiratory:  Positive for cough. Negative for shortness of breath. Cardiovascular:  Negative for chest pain. Gastrointestinal:  Negative for abdominal pain, diarrhea, nausea and vomiting. Genitourinary:  Negative for dysuria and hematuria. Musculoskeletal:  Negative for myalgias. Skin:  Negative for rash. Neurological:  Negative for headaches. All other systems reviewed and are negative. Physical Exam   Patient Vitals for the past 4 hrs:   Temp Pulse Resp BP SpO2   12/31/22 0849 98.2 °F (36.8 °C) 86 18 97/79 100 %        Physical Exam  Vitals and nursing note reviewed. Constitutional:       General: She is not in acute distress. Appearance: She is not toxic-appearing. Comments: Patient well-appearing, nontoxic. HENT:      Head: Atraumatic. Right Ear: External ear normal.      Left Ear: External ear normal.      Nose: Nose normal.      Mouth/Throat:      Mouth: Mucous membranes are moist.   Eyes:      Extraocular Movements: Extraocular movements intact. Conjunctiva/sclera: Conjunctivae normal.   Cardiovascular:      Rate and Rhythm: Normal rate and regular rhythm. Pulses: Normal pulses. Heart sounds: Normal heart sounds. No murmur heard. No friction rub. No gallop. Pulmonary:      Effort: Pulmonary effort is normal. No respiratory distress. Breath sounds: Normal breath sounds. No stridor. No wheezing, rhonchi or rales. Comments: Patient speaking in full sentences with no accessory muscle use, increased work of breathing ,or signs of respiratory distress. Good breath sounds to bases bilaterally. No wheezing, rhonchi, crackles or stridor. Abdominal:      General: Abdomen is flat. There is no distension. Palpations: Abdomen is soft. Tenderness: There is no abdominal tenderness. There is no guarding or rebound. Musculoskeletal:         General: No swelling. Cervical back: Neck supple. Skin:     General: Skin is warm. Neurological:      Mental Status: She is alert and oriented to person, place, and time. Psychiatric:         Mood and Affect: Mood normal.         Behavior: Behavior normal.         Thought Content: Thought content normal.         Judgment: Judgment normal.       Diagnostic Study Results     Labs -   No results found for this or any previous visit (from the past 12 hour(s)). EKG: When ordered, EKG's are interpreted by the Emergency Department Physician in the absence of a cardiologist.  Please see their note for interpretation of EKG. Radiologic Studies -   No results found. Medical Decision Making   I am the first provider for this patient. I reviewed the vital signs, available nursing notes, past medical history, past surgical history, family history and social history. Vital Signs-Reviewed the patient's vital signs. Patient Vitals for the past 12 hrs:   Temp Pulse Resp BP SpO2   12/31/22 0849 98.2 °F (36.8 °C) 86 18 97/79 100 %       Records Reviewed: Nursing Notes and Old Medical Records    Provider Notes (Medical Decision Making):   Patient evaluated for progressive cough for the past 9 to 10 days. She is afebrile in the ED but does report taking antipyretics at home and has reported elevated temperature as high as 100. She has been trying over-the-counter medications with no relief. Her lung exam is normal and she does report receiving a normal chest x-ray at [facility] ED a few days ago. Her differential diagnosis includes but not limited to bronchitis versus postviral cough versus atypical pneumonia. Given the constellation of patient's symptoms and advanced age, I do feel benefits outweigh risks of prescribing azithromycin for atypical pneumonia.   However, she is otherwise well-appearing and tolerating p.o. and I do not feel she would benefit from further work-up, intervention or hospitalization at this time. She was advised on symptomatic care. She will follow-up with her PCP within 2 to 3 days if her symptoms do not resolve. She was given return precautions ED for worsening symptoms. Patient expressed understanding and agreement to discharge instructions and treatment plan    ED Course:   Initial assessment performed. The patients presenting problems have been discussed, and they are in agreement with the care plan formulated and outlined with them. I have encouraged them to ask questions as they arise throughout their visit. Critical Care Time: None    Disposition:  discharged    PLAN:  1. Current Discharge Medication List        START taking these medications    Details   azithromycin (Zithromax Z-Ken) 250 mg tablet Take 1 Tablet by mouth daily for 5 days. Take 2 tabs today; then take 1 tab daily for 4 days  Qty: 6 Tablet, Refills: 0  Start date: 12/31/2022, End date: 1/5/2023      predniSONE (STERAPRED DS) 10 mg dose pack Take as follows Day 1: 5 tabs PO - Day 2: 4 tabs PO - Day 3: 3 tabs PO - Day 4: 2 tabs PO - Day 5: 1 tabs  Qty: 15 Tablet, Refills: 0  Start date: 12/31/2022      guaiFENesin-codeine (ROBITUSSIN AC) 100-10 mg/5 mL solution Take 5 mL by mouth three (3) times daily as needed for Cough for up to 3 days. Max Daily Amount: 15 mL. Qty: 45 mL, Refills: 0  Start date: 12/31/2022, End date: 1/3/2023    Associated Diagnoses: Acute cough      benzonatate (Tessalon Perles) 100 mg capsule Take 1 Capsule by mouth three (3) times daily as needed for Cough for up to 7 days. Do not take within 6 hours of other cough suppressant medications, to include Robitussin, codeine, or other over-the-counter cough suppressant medication. Qty: 21 Capsule, Refills: 0  Start date: 12/31/2022, End date: 1/7/2023           2. Follow-up Information    None       Return to ED if worse     Diagnosis     Clinical Impression:   1. Acute cough    2.  Atypical pneumonia FRANCY Rich (Electronic Signature)          Please note that this dictation was completed with Voxy, the computer voice recognition software. Quite often unanticipated grammatical, syntax, homophones, and other interpretive errors are inadvertently transcribed by the computer software. Please disregards these errors. Please excuse any errors that have escaped final proofreading.

## 2022-12-31 NOTE — DISCHARGE INSTRUCTIONS
You may also try drinking a tablespoon of honey 2-3 times daily with hot liquid, to include tea, coffee, or water with lemon. This has been shown to help with a sore throat and cough. Take a shower or run hot weather to breathe in steam before bed. Run a humidifier at night to help with your cough.

## 2023-01-06 ENCOUNTER — OFFICE VISIT (OUTPATIENT)
Dept: INTERNAL MEDICINE CLINIC | Age: 68
End: 2023-01-06
Payer: MEDICARE

## 2023-01-06 VITALS
BODY MASS INDEX: 22.88 KG/M2 | WEIGHT: 109 LBS | DIASTOLIC BLOOD PRESSURE: 78 MMHG | HEIGHT: 58 IN | OXYGEN SATURATION: 99 % | TEMPERATURE: 97.9 F | RESPIRATION RATE: 16 BRPM | HEART RATE: 85 BPM | SYSTOLIC BLOOD PRESSURE: 116 MMHG

## 2023-01-06 DIAGNOSIS — R05.9 COUGH, UNSPECIFIED TYPE: Primary | ICD-10-CM

## 2023-01-06 DIAGNOSIS — J20.9 ACUTE BRONCHITIS, UNSPECIFIED ORGANISM: Primary | ICD-10-CM

## 2023-01-06 PROCEDURE — G8420 CALC BMI NORM PARAMETERS: HCPCS | Performed by: INTERNAL MEDICINE

## 2023-01-06 PROCEDURE — 1101F PT FALLS ASSESS-DOCD LE1/YR: CPT | Performed by: INTERNAL MEDICINE

## 2023-01-06 PROCEDURE — G9717 DOC PT DX DEP/BP F/U NT REQ: HCPCS | Performed by: INTERNAL MEDICINE

## 2023-01-06 PROCEDURE — 1090F PRES/ABSN URINE INCON ASSESS: CPT | Performed by: INTERNAL MEDICINE

## 2023-01-06 PROCEDURE — G9899 SCRN MAM PERF RSLTS DOC: HCPCS | Performed by: INTERNAL MEDICINE

## 2023-01-06 PROCEDURE — G8536 NO DOC ELDER MAL SCRN: HCPCS | Performed by: INTERNAL MEDICINE

## 2023-01-06 PROCEDURE — 3017F COLORECTAL CA SCREEN DOC REV: CPT | Performed by: INTERNAL MEDICINE

## 2023-01-06 PROCEDURE — 99213 OFFICE O/P EST LOW 20 MIN: CPT | Performed by: INTERNAL MEDICINE

## 2023-01-06 PROCEDURE — G8427 DOCREV CUR MEDS BY ELIG CLIN: HCPCS | Performed by: INTERNAL MEDICINE

## 2023-01-06 PROCEDURE — 1123F ACP DISCUSS/DSCN MKR DOCD: CPT | Performed by: INTERNAL MEDICINE

## 2023-01-06 RX ORDER — HYDROCODONE POLISTIREX AND CHLORPHENIRAMINE POLISTIREX 10; 8 MG/5ML; MG/5ML
5 SUSPENSION, EXTENDED RELEASE ORAL
Qty: 200 ML | Refills: 0 | Status: SHIPPED | OUTPATIENT
Start: 2023-01-06 | End: 2023-01-06 | Stop reason: ALTCHOICE

## 2023-01-06 RX ORDER — CODEINE PHOSPHATE AND GUAIFENESIN 10; 100 MG/5ML; MG/5ML
5 SOLUTION ORAL
Qty: 45 ML | Refills: 0 | Status: SHIPPED | OUTPATIENT
Start: 2023-01-06 | End: 2023-01-09

## 2023-01-06 RX ORDER — PREDNISONE 5 MG/1
TABLET ORAL
Qty: 48 TABLET | Refills: 0 | Status: SHIPPED | OUTPATIENT
Start: 2023-01-06

## 2023-01-06 RX ORDER — CEFUROXIME AXETIL 500 MG/1
500 TABLET ORAL 2 TIMES DAILY
Qty: 20 TABLET | Refills: 0 | Status: SHIPPED | OUTPATIENT
Start: 2023-01-06

## 2023-01-06 RX ORDER — PREDNISONE 5 MG/1
TABLET ORAL
Qty: 21 TABLET | Refills: 0 | Status: SHIPPED | OUTPATIENT
Start: 2023-01-06 | End: 2023-01-06 | Stop reason: ALTCHOICE

## 2023-01-06 NOTE — PROGRESS NOTES
Subjective:   Areli Salamanca is a 75 y.o. female      Chief Complaint   Patient presents with    Cough     X 3 weeks        History of present illness: She presents today with cough congestion has been going on for about 3 weeks. [facility] which was negative. She ended up going to the urgent care/[facility] center on [address] and they told it was URI with chest x-ray done somewhere along the way which was clear. She was seen in [facility] where they diagnosed her with an atypical pneumonia and gave her a Z-Ken and prednisone which she has completed but still has a lot of cough and congestion. She notes no fevers or chills. There is no shortness of breath. COVID as noted has been negative.     Patient Active Problem List   Diagnosis Code    Bilateral carpal tunnel syndrome G56.03    Cervical radiculopathy due to degenerative joint disease of spine M47.22    Acquired hypothyroidism E03.9    Alcohol screening Z13.39    Age-related osteoporosis without current pathological fracture M81.0    Primary osteoarthritis involving multiple joints M15.9    Stage 3 chronic kidney disease (HCC) N18.30    Mixed hyperlipidemia E78.2    Elevated liver enzymes R74.8    Common bile duct dilation K83.8    Cervical spinal stenosis M48.02    Degenerative disc disease, cervical M50.30    Herniation of cervical intervertebral disc M50.20    S/P cervical spinal fusion Z98.1    Acute cystitis N30.00    Depression, major, recurrent, moderate (HCC) F33.1    Primary insomnia F51.01    Headache R51.9    Acute midline low back pain without sciatica M54.50    Primary osteoarthritis of left hip M16.12    Vitamin D deficiency E55.9    Ganglion cyst of finger M67.449    Acute bronchitis J20.9      Past Medical History:   Diagnosis Date    Adverse effect of anesthesia     Pt states her daughter had an Anaphylactic reaction to Anesthesia    Arthritis     Back pain Hypertension     Single episode    Menopause     Neck pain     Psychiatric disorder     Depression, Anxiety    Thyroid disease       No Known Allergies   Family History   Problem Relation Age of Onset    Breast Cancer Paternal Grandmother         over 48    Stroke Mother     Heart Attack Father     Other Daughter         anaphylactic reaction to anesthesia      Social History     Socioeconomic History    Marital status:      Spouse name: Not on file    Number of children: Not on file    Years of education: Not on file    Highest education level: Not on file   Occupational History    Not on file   Tobacco Use    Smoking status: Never    Smokeless tobacco: Never   Vaping Use    Vaping Use: Never used   Substance and Sexual Activity    Alcohol use: Yes     Comment: 3 Drinks per month    Drug use: No    Sexual activity: Not Currently   Other Topics Concern    Not on file   Social History Narrative    Not on file     Social Determinants of Health     Financial Resource Strain: Not on file   Food Insecurity: Not on file   Transportation Needs: Not on file   Physical Activity: Not on file   Stress: Not on file   Social Connections: Not on file   Intimate Partner Violence: Not on file   Housing Stability: Not on file     Prior to Admission medications    Medication Sig Start Date End Date Taking? Authorizing Provider   cefUROXime (CEFTIN) 500 mg tablet Take 1 Tablet by mouth two (2) times a day. 1/6/23  Yes Nesha Isaacs MD   HYDROcodone-chlorpheniramine (TUSSIONEX) 10-8 mg/5 mL suspension Take 5 mL by mouth every twelve (12) hours as needed for Cough for up to 14 days. Max Daily Amount: 10 mL. 1/6/23 1/20/23 Yes Nesha Isaacs MD   predniSONE (STERAPRED) 5 mg dose pack See administration instruction per 5mg dose pack 1/6/23  Yes Nesha Isaacs MD   benzonatate (Tessalon Perles) 100 mg capsule Take 1 Capsule by mouth three (3) times daily as needed for Cough for up to 7 days.  Do not take within 6 hours of other cough suppressant medications, to include Robitussin, codeine, or other over-the-counter cough suppressant medication. 12/31/22 1/7/23 Yes FRANCY Plaza   atorvastatin (LIPITOR) 20 mg tablet Take 1 Tablet by mouth daily. 11/11/22  Yes Raegan Sanz MD   levothyroxine (SYNTHROID) 150 mcg tablet Take 1 Tablet by mouth Daily (before breakfast). 8/2/22  Yes Raegan Sanz MD   calcium carbonate (CALCIUM 300 PO) Take 1 Tablet by mouth daily. Yes Provider, Historical   sertraline (ZOLOFT) 100 mg tablet Take 1 Tablet by mouth daily. 2/23/22  Yes Raegan Sanz MD   diclofenac EC (VOLTAREN) 75 mg EC tablet TAKE 1 TABLET BY MOUTH TWICE A DAY 7/19/21  Yes Raegan Sanz MD   multivitamin (ONE A DAY) tablet Take 1 Tab by mouth daily. Yes Provider, Historical   azithromycin (Zithromax Z-Ken) 250 mg tablet Take 1 Tablet by mouth daily for 5 days. Take 2 tabs today; then take 1 tab daily for 4 days 12/31/22 1/5/23  FRANCY Plaza        Review of Systems              Constitutional:  She denies fever, weight loss, sweats or fatigue. EYES: No blurred or double vision,               ENT: no nasal congestion, no headache or dizziness. No difficulty with               swallowing, taste, speech or smell. Respiratory:  No cough, wheezing or shortness of breath. No sputum production. Cardiac:  Denies chest pain, palpitations, unexplained indigestion, syncope, edema, PND or orthopnea. GI:  No changes in bowel movements, no abdominal pain, no bloating, anorexia, nausea, vomiting or heartburn. :  No frequency or dysuria. Denies incontinence or sexual dysfunction. Extremities:  No joint pain, stiffness or swelling  Back:.no pain or soreness  Skin:  No recent rashes or mole changes. Neurological:  No numbness, tingling, burning paresthesias or loss of motor strength. No syncope, dizziness, frequent headaches or memory loss.   Hematologic:  No easy bruising  Lymphatic: No lymph node enlargement    Objective:     Vitals:    01/06/23 1119   BP: 116/78   Pulse: 85   Resp: 16   Temp: 97.9 °F (36.6 °C)   TempSrc: Oral   SpO2: 99%   Weight: 109 lb (49.4 kg)   Height: 4' 10\" (1.473 m)   PainSc:   2   PainLoc: Head       Body mass index is 22.78 kg/m². Physical Examination:              General Appearance:  Well-developed, well-nourished, no acute distress. HEENT:      Ears:  The TMs and ear canals were clear. Eyes:  The pupillary responses were normal.  Extraocular muscle function intact. Lids and conjunctiva not injected. Funduscopic exam revealed sharp disc margins. Nares: Clear w/o edema or erythema  Pharynx:  Clear with teeth in good repair. No masses were noted. Neck:  Supple without thyromegaly or adenopathy. No JVD noted. No carotid                bruits. Lungs:  Clear to auscultation and percussion. Cardiac:  Regular rate and rhythm without murmur. PMI not displaced. No gallop, rub or click. Abdominal: Soft, non-tender, no hepata-spleenomegally or masses  Extremities:  No clubbing, cyanosis or edema. Skin:  No rash or unusual mole changes noted. Lymph Nodes:  None felt in the cervical, supraclavicular, axillary or inguinal region. Neurological: . DTRs 2+ and symmetric. Station and gait normal.   Hematologic:   No purpura or petechiae        Assessment/Plan:         1. Acute bronchitis, unspecified organism        Impressions/Plan:  Impression  1 acute bronchitis we will try Ceftin twice a day for 10 days. Abdomen Tussionex for the cough and have also placed her on a prednisone 12-day Dosepak  Recheck if not resolved. Orders Placed This Encounter    cefUROXime (CEFTIN) 500 mg tablet    DISCONTD: predniSONE (STERAPRED) 5 mg dose pack    HYDROcodone-chlorpheniramine (TUSSIONEX) 10-8 mg/5 mL suspension    predniSONE (STERAPRED) 5 mg dose pack           No results found for any visits on 01/06/23.       Desirae Chan MD    The patient was given after the visit summary the patient verbalized an understanding of the plans and problems as explained.

## 2023-01-06 NOTE — TELEPHONE ENCOUNTER
RX refill request from the patient/pharmacy. Patient last seen 01- with labs, and next appt. scheduled for 02-  Requested Prescriptions     Pending Prescriptions Disp Refills    guaiFENesin-codeine (ROBITUSSIN AC) 100-10 mg/5 mL solution 45 mL 0     Sig: Take 5 mL by mouth three (3) times daily as needed for Cough for up to 3 days. Max Daily Amount: 15 mL. Jean Claude Carlson

## 2023-01-06 NOTE — PROGRESS NOTES
Chief Complaint   Patient presents with    Cough     X 3 weeks     Visit Vitals  /78 (BP 1 Location: Left upper arm, BP Patient Position: Sitting, BP Cuff Size: Adult)   Pulse 85   Temp 97.9 °F (36.6 °C) (Oral)   Resp 16   Ht 4' 10\" (1.473 m)   Wt 109 lb (49.4 kg)   SpO2 99%   BMI 22.78 kg/m²     1. Have you been to the ER, urgent care clinic since your last visit? Hospitalized since your last visit? [facility] DX:URI  [facility] DX:URI  ED [facility] 12/31/22 DX:atypical pnuemonia    2. Have you seen or consulted any other health care providers outside of the [address] [place] [address] since your last visit? Include any pap smears or colon screening.  No

## 2023-01-11 RX ORDER — LEVOTHYROXINE SODIUM 150 UG/1
TABLET ORAL
Qty: 90 TABLET | Refills: 3 | Status: SHIPPED | OUTPATIENT
Start: 2023-01-11

## 2023-01-11 NOTE — TELEPHONE ENCOUNTER
RX refill request from the patient/pharmacy. Patient last seen 01- with labs, and next appt. scheduled for 02-  Requested Prescriptions     Pending Prescriptions Disp Refills    levothyroxine (SYNTHROID) 150 mcg tablet [Pharmacy Med Name: L-THYROXINE (SYNTHROID) TABS 150MCG] 90 Tablet 3     Sig: TAKE 1 TABLET DAILY BEFORE BREAKFAST (DOSE INCREASE PER LABS ON 7/29/22)    .

## 2023-01-20 ENCOUNTER — TELEPHONE (OUTPATIENT)
Dept: INTERNAL MEDICINE CLINIC | Age: 68
End: 2023-01-20

## 2023-01-20 RX ORDER — CIPROFLOXACIN 250 MG/1
250 TABLET, FILM COATED ORAL 2 TIMES DAILY
Qty: 14 TABLET | Refills: 0 | Status: SHIPPED | OUTPATIENT
Start: 2023-01-20 | End: 2023-01-27

## 2023-01-20 NOTE — TELEPHONE ENCOUNTER
Patient c/o urinary frequency and very little urine output. Thinks she has a UTI. Discussed with Dr. Devon Michaud and he gave a verbal order for Cipro 250 mg bid x 7 days. Rx sent to Dr. Devon Michaud to review and sign.

## 2023-02-07 ENCOUNTER — TRANSCRIBE ORDER (OUTPATIENT)
Dept: SCHEDULING | Age: 68
End: 2023-02-07

## 2023-02-07 DIAGNOSIS — Z12.31 SCREENING MAMMOGRAM FOR HIGH-RISK PATIENT: Primary | ICD-10-CM

## 2023-02-07 NOTE — PROGRESS NOTES
Chief Complaint   Patient presents with    Cholesterol Problem     3 month f/u       SUBJECTIVE:    Dylan Ortiz is a 75 y.o. female who returns in follow-up for medical problems include hyperlipidemia, DJD, osteoporosis, depression, insomnia and other multiple medical problems. She is taking her medications trying to follow her diet try and remain physically active. She currently denies any chest pain, shortness of breath, palpitations, PND, orthopnea or other cardiac or respiratory complaints. There are no current GI or  complaints. There are no headaches, dizziness or neurologic complaints. There are no current active arthritic complaints and no other complaints on complete review of systems. Current Outpatient Medications   Medication Sig Dispense Refill    levothyroxine (SYNTHROID) 150 mcg tablet TAKE 1 TABLET DAILY BEFORE BREAKFAST (DOSE INCREASE PER LABS ON 7/29/22) 90 Tablet 3    atorvastatin (LIPITOR) 20 mg tablet Take 1 Tablet by mouth daily. 90 Tablet 3    calcium carbonate (CALCIUM 300 PO) Take 1 Tablet by mouth daily. sertraline (ZOLOFT) 100 mg tablet Take 1 Tablet by mouth daily. 90 Tablet 3    multivitamin (ONE A DAY) tablet Take 1 Tab by mouth daily. cefUROXime (CEFTIN) 500 mg tablet Take 1 Tablet by mouth two (2) times a day.  (Patient not taking: Reported on 2/8/2023) 20 Tablet 0    predniSONE (STERAPRED) 5 mg dose pack See administration instruction per 5mg dose pack (Patient not taking: Reported on 2/8/2023) 48 Tablet 0    diclofenac EC (VOLTAREN) 75 mg EC tablet TAKE 1 TABLET BY MOUTH TWICE A DAY (Patient not taking: Reported on 2/8/2023) 180 Tablet 3     Past Medical History:   Diagnosis Date    Adverse effect of anesthesia     Pt states her daughter had an Anaphylactic reaction to Anesthesia    Arthritis     Back pain     Hypertension     Single episode    Menopause     Neck pain     Psychiatric disorder     Depression, Anxiety    Thyroid disease      Past Surgical History:   Procedure Laterality Date    COLONOSCOPY N/A 3/22/2022    COLONOSCOPY performed by Jeanette Amezquita MD at [facility] ENDOSCOPY    HX BACK SURGERY      c4-5 fusion    HX ORTHOPAEDIC      Rotator Cuff Repair, Right    HX ORTHOPAEDIC      Carpal Tunnel Repair, Right    ID COLON CA SCRN NOT HI [facility] IND  3/22/2022          No Known Allergies    REVIEW OF SYSTEMS:  General: negative for - chills or fever, or weight loss or gain  ENT: negative for - headaches, nasal congestion or tinnitus  Eyes: no blurred or visual changes  Neck: No stiffness or swollen nodes  Respiratory: negative for - cough, hemoptysis, shortness of breath or wheezing  Cardiovascular : negative for - chest pain, edema, palpitations or shortness of breath  Gastrointestinal: negative for - abdominal pain, blood in stools, heartburn or nausea/vomiting  Genito-Urinary: no dysuria, trouble voiding, or hematuria  Musculoskeletal: negative for - gait disturbance, joint pain, joint stiffness or joint swelling  Neurological: no TIA or stroke symptoms  Hematologic: no bruises, no bleeding  Lymphatic: no swollen glands  Integument: no lumps, mole changes, nail changes or rash  Endocrine:no malaise/lethargy poly uria or polydipsia or unexpected weight changes        Social History     Socioeconomic History    Marital status:    Tobacco Use    Smoking status: Never    Smokeless tobacco: Never   Vaping Use    Vaping Use: Never used   Substance and Sexual Activity    Alcohol use: Yes     Comment: 3 Drinks per month    Drug use: No    Sexual activity: Not Currently     Family History   Problem Relation Age of Onset    Breast Cancer Paternal Grandmother         over 48    Stroke Mother     Heart Attack Father     Other Daughter         anaphylactic reaction to anesthesia       OBJECTIVE:     Visit Vitals  /70 (BP 1 Location: Left upper arm, BP Patient Position: Sitting)   Pulse 84   Temp 98.1 °F (36.7 °C) (Oral)   Resp 16   Ht 4' 10\" (1.473 m)   Wt 108 lb 4.8 oz (49.1 kg)   SpO2 97%   BMI 22.63 kg/m²     CONSTITUTIONAL:   well nourished, appears age appropriate  EYES: sclera anicteric, PERRL, EOMI  ENMT:nares clear, moist mucous membranes, pharynx clear  NECK: supple. Thyroid normal, No JVD or bruits  RESPIRATORY: Chest: clear to ascultation and percussion, normal inspiratory effort  CARDIOVASCULAR: Heart: regular rate and rhythm no murmurs, rubs or gallops, PMI not displaced, No thrills, no peripheral edema  GASTROINTESTINAL: Abdomen: non distended, soft, non tender, bowel sounds normal  HEMATOLOGIC: no purpura, petechiae or bruising  LYMPHATIC: No lymph node enlargemant  MUSCULOSKELETAL: Extremities: no active synovitis, pulse 1+   INTEGUMENT: No unusual rashes or suspicious skin lesions noted. Nails appear normal.  PERIPHERAL VASCULAR: normal pulses femoral, PT and DP  NEUROLOGIC: non-focal exam, A & O X 3  PSYCHIATRIC:, appropriate affect     ASSESSMENT:   1. Mixed hyperlipidemia    2. Primary osteoarthritis involving multiple joints    3. Stage 3 chronic kidney disease, unspecified whether stage 3a or 3b CKD (HCC)    4. Age-related osteoporosis without current pathological fracture      Impression  1. Hyperlipidemia prior lab reviewed repeat status pending  2. DJD that is stable  3. CKD stage III repeat status pending   4. Osteoporosis she is due for repeat bone density we will schedule  Follow-up with me again 3 months or sooner should the be a problem. I will call with today's lab results. PLAN:  .  Orders Placed This Encounter    DEXA BONE DENSITY STUDY AXIAL    METABOLIC PANEL, COMPREHENSIVE    LIPID PANEL    CK         ATTENTION:   This medical record was transcribed using an electronic medical records system. Although proofread, it may and can contain electronic and spelling errors. Other human spelling and other errors may be present. Corrections may be executed at a later time.   Please feel free to contact us for any clarifications as needed. Follow-up and Dispositions    Return in about 3 months (around 5/8/2023). No results found for any visits on 02/08/23. Griffin Campos MD    The patient verbalized understanding of the problems and plans as explained.

## 2023-02-08 ENCOUNTER — OFFICE VISIT (OUTPATIENT)
Dept: INTERNAL MEDICINE CLINIC | Age: 68
End: 2023-02-08
Payer: MEDICARE

## 2023-02-08 VITALS
TEMPERATURE: 98.1 F | SYSTOLIC BLOOD PRESSURE: 110 MMHG | HEIGHT: 58 IN | BODY MASS INDEX: 22.73 KG/M2 | OXYGEN SATURATION: 97 % | RESPIRATION RATE: 16 BRPM | DIASTOLIC BLOOD PRESSURE: 70 MMHG | WEIGHT: 108.3 LBS | HEART RATE: 84 BPM

## 2023-02-08 DIAGNOSIS — M15.9 PRIMARY OSTEOARTHRITIS INVOLVING MULTIPLE JOINTS: ICD-10-CM

## 2023-02-08 DIAGNOSIS — M81.0 AGE-RELATED OSTEOPOROSIS WITHOUT CURRENT PATHOLOGICAL FRACTURE: ICD-10-CM

## 2023-02-08 DIAGNOSIS — E78.2 MIXED HYPERLIPIDEMIA: Primary | ICD-10-CM

## 2023-02-08 DIAGNOSIS — N18.30 STAGE 3 CHRONIC KIDNEY DISEASE, UNSPECIFIED WHETHER STAGE 3A OR 3B CKD (HCC): ICD-10-CM

## 2023-02-08 PROCEDURE — G8536 NO DOC ELDER MAL SCRN: HCPCS | Performed by: INTERNAL MEDICINE

## 2023-02-08 PROCEDURE — G9899 SCRN MAM PERF RSLTS DOC: HCPCS | Performed by: INTERNAL MEDICINE

## 2023-02-08 PROCEDURE — 1123F ACP DISCUSS/DSCN MKR DOCD: CPT | Performed by: INTERNAL MEDICINE

## 2023-02-08 PROCEDURE — G8427 DOCREV CUR MEDS BY ELIG CLIN: HCPCS | Performed by: INTERNAL MEDICINE

## 2023-02-08 PROCEDURE — 1101F PT FALLS ASSESS-DOCD LE1/YR: CPT | Performed by: INTERNAL MEDICINE

## 2023-02-08 PROCEDURE — 99214 OFFICE O/P EST MOD 30 MIN: CPT | Performed by: INTERNAL MEDICINE

## 2023-02-08 PROCEDURE — G9717 DOC PT DX DEP/BP F/U NT REQ: HCPCS | Performed by: INTERNAL MEDICINE

## 2023-02-08 PROCEDURE — G8420 CALC BMI NORM PARAMETERS: HCPCS | Performed by: INTERNAL MEDICINE

## 2023-02-08 PROCEDURE — 1090F PRES/ABSN URINE INCON ASSESS: CPT | Performed by: INTERNAL MEDICINE

## 2023-02-08 PROCEDURE — 3017F COLORECTAL CA SCREEN DOC REV: CPT | Performed by: INTERNAL MEDICINE

## 2023-02-08 NOTE — PROGRESS NOTES
Chief Complaint   Patient presents with    Cholesterol Problem     3 month f/u       1. \"Have you been to the ER, urgent care clinic since your last visit? Hospitalized since your last visit? \" No    2. \"Have you seen or consulted any other health care providers outside of the [address] [place] [address] since your last visit? \" No     3. For patients aged 39-70: Has the patient had a colonoscopy / FIT/ Cologuard? Yes - no Care Gap present      If the patient is female:    4. For patients aged 41-77: Has the patient had a mammogram within the past 2 years? Yes - no Care Gap present      5. For patients aged 21-65: Has the patient had a pap smear?  Yes - no Care Gap present    Visit Vitals  /70 (BP 1 Location: Left upper arm, BP Patient Position: Sitting)   Pulse 84   Temp 98.1 °F (36.7 °C) (Oral)   Resp 16   Ht 4' 10\" (1.473 m)   Wt 108 lb 4.8 oz (49.1 kg)   SpO2 97%   BMI 22.63 kg/m²

## 2023-02-09 LAB
ALBUMIN SERPL-MCNC: 4.3 G/DL (ref 3.5–5)
ALBUMIN/GLOB SERPL: 1.5 (ref 1.1–2.2)
ALP SERPL-CCNC: 62 U/L (ref 45–117)
ALT SERPL-CCNC: 53 U/L (ref 12–78)
ANION GAP SERPL CALC-SCNC: 8 MMOL/L (ref 5–15)
AST SERPL-CCNC: 32 U/L (ref 15–37)
BILIRUB SERPL-MCNC: 0.4 MG/DL (ref 0.2–1)
BUN SERPL-MCNC: 12 MG/DL (ref 6–20)
BUN/CREAT SERPL: 15 (ref 12–20)
CALCIUM SERPL-MCNC: 9.7 MG/DL (ref 8.5–10.1)
CHLORIDE SERPL-SCNC: 102 MMOL/L (ref 97–108)
CHOLEST SERPL-MCNC: 222 MG/DL
CK SERPL-CCNC: 83 U/L (ref 26–192)
CO2 SERPL-SCNC: 29 MMOL/L (ref 21–32)
CREAT SERPL-MCNC: 0.8 MG/DL (ref 0.55–1.02)
GLOBULIN SER CALC-MCNC: 2.9 G/DL (ref 2–4)
GLUCOSE SERPL-MCNC: 111 MG/DL (ref 65–100)
HDLC SERPL-MCNC: 104 MG/DL
HDLC SERPL: 2.1 (ref 0–5)
LDLC SERPL CALC-MCNC: 99.6 MG/DL (ref 0–100)
POTASSIUM SERPL-SCNC: 4.1 MMOL/L (ref 3.5–5.1)
PROT SERPL-MCNC: 7.2 G/DL (ref 6.4–8.2)
SODIUM SERPL-SCNC: 139 MMOL/L (ref 136–145)
TRIGL SERPL-MCNC: 92 MG/DL (ref ?–150)
VLDLC SERPL CALC-MCNC: 18.4 MG/DL

## 2023-03-08 ENCOUNTER — HOSPITAL ENCOUNTER (OUTPATIENT)
Dept: MAMMOGRAPHY | Age: 68
Discharge: HOME | End: 2023-03-08
Attending: INTERNAL MEDICINE
Payer: MEDICARE

## 2023-03-08 DIAGNOSIS — Z12.31 SCREENING MAMMOGRAM FOR HIGH-RISK PATIENT: ICD-10-CM

## 2023-03-08 PROCEDURE — 77067 SCR MAMMO BI INCL CAD: CPT

## 2023-04-20 ENCOUNTER — OFFICE VISIT (OUTPATIENT)
Dept: INTERNAL MEDICINE CLINIC | Age: 68
End: 2023-04-20
Payer: MEDICARE

## 2023-04-20 VITALS
OXYGEN SATURATION: 98 % | BODY MASS INDEX: 23.18 KG/M2 | HEART RATE: 69 BPM | RESPIRATION RATE: 16 BRPM | WEIGHT: 110.4 LBS | SYSTOLIC BLOOD PRESSURE: 116 MMHG | HEIGHT: 58 IN | DIASTOLIC BLOOD PRESSURE: 75 MMHG | TEMPERATURE: 97.7 F

## 2023-04-20 DIAGNOSIS — G56.02 CARPAL TUNNEL SYNDROME OF LEFT WRIST: Primary | ICD-10-CM

## 2023-04-20 PROCEDURE — 1090F PRES/ABSN URINE INCON ASSESS: CPT | Performed by: INTERNAL MEDICINE

## 2023-04-20 PROCEDURE — 3017F COLORECTAL CA SCREEN DOC REV: CPT | Performed by: INTERNAL MEDICINE

## 2023-04-20 PROCEDURE — 1101F PT FALLS ASSESS-DOCD LE1/YR: CPT | Performed by: INTERNAL MEDICINE

## 2023-04-20 PROCEDURE — 1123F ACP DISCUSS/DSCN MKR DOCD: CPT | Performed by: INTERNAL MEDICINE

## 2023-04-20 PROCEDURE — 99213 OFFICE O/P EST LOW 20 MIN: CPT | Performed by: INTERNAL MEDICINE

## 2023-04-20 PROCEDURE — G8420 CALC BMI NORM PARAMETERS: HCPCS | Performed by: INTERNAL MEDICINE

## 2023-04-20 PROCEDURE — G9717 DOC PT DX DEP/BP F/U NT REQ: HCPCS | Performed by: INTERNAL MEDICINE

## 2023-04-20 PROCEDURE — G8536 NO DOC ELDER MAL SCRN: HCPCS | Performed by: INTERNAL MEDICINE

## 2023-04-20 PROCEDURE — G8427 DOCREV CUR MEDS BY ELIG CLIN: HCPCS | Performed by: INTERNAL MEDICINE

## 2023-04-20 PROCEDURE — G9899 SCRN MAM PERF RSLTS DOC: HCPCS | Performed by: INTERNAL MEDICINE

## 2023-04-20 NOTE — PROGRESS NOTES
Identified pt with two pt identifiers(name and ). Reviewed record in preparation for visit and have obtained necessary documentation. Chief Complaint   Patient presents with    Numbness     Left hand    Tingling     Left hand    Hand Swelling     Bilateral hand swelling and pain in mornings        Health Maintenance Due   Topic    DTaP/Tdap/Td series (1 - Tdap)    Shingles Vaccine (1 of 2)    COVID-19 Vaccine (4 - Booster for Moderna series)      Visit Vitals  /75 (BP 1 Location: Right arm, BP Patient Position: Sitting, BP Cuff Size: Adult)   Pulse 69   Temp 97.7 °F (36.5 °C) (Oral)   Resp 16   Ht 4' 10\" (1.473 m)   Wt 110 lb 6.4 oz (50.1 kg)   SpO2 98%   BMI 23.07 kg/m²     Pain Scale: 3/10    Coordination of Care Questionnaire:  :   1. Have you been to the ER, urgent care clinic since your last visit? Hospitalized since your last visit? No    2. Have you seen or consulted any other health care providers outside of the [address] [place] [address] since your last visit? Include any pap smears or colon screening.  No

## 2023-04-20 NOTE — PROGRESS NOTES
Subjective:   Joshua Rutherford is a 75 y.o. female      Chief Complaint   Patient presents with    Numbness     Left hand    Tingling     Left hand    Hand Swelling     Bilateral hand swelling and pain in mornings        History of present illness: She presents complaints of numbness and tingling in her left hand with some sensation and hand swelling. She notes that the numbness and tingling, she is using the hand now she she notes that when she wakes up in the morning.     Patient Active Problem List   Diagnosis Code    Carpal tunnel syndrome of left wrist G56.02    Cervical radiculopathy due to degenerative joint disease of spine M47.22    Acquired hypothyroidism E03.9    Alcohol screening Z13.39    Age-related osteoporosis without current pathological fracture M81.0    Primary osteoarthritis involving multiple joints M15.9    Stage 3 chronic kidney disease (HCC) N18.30    Mixed hyperlipidemia E78.2    Elevated liver enzymes R74.8    Common bile duct dilation K83.8    Cervical spinal stenosis M48.02    Degenerative disc disease, cervical M50.30    Herniation of cervical intervertebral disc M50.20    S/P cervical spinal fusion Z98.1    Acute cystitis N30.00    Depression, major, recurrent, moderate (HCC) F33.1    Primary insomnia F51.01    Headache R51.9    Acute midline low back pain without sciatica M54.50    Primary osteoarthritis of left hip M16.12    Vitamin D deficiency E55.9    Ganglion cyst of finger M67.449    Acute bronchitis J20.9      Past Medical History:   Diagnosis Date    Adverse effect of anesthesia     Pt states her daughter had an Anaphylactic reaction to Anesthesia    Arthritis     Back pain     Hypertension     Single episode    Menopause     Neck pain     Psychiatric disorder     Depression, Anxiety    Thyroid disease       No Known Allergies   Family History   Problem Relation Age of Onset    Breast Cancer Paternal Grandmother         over 48    Stroke Mother     Heart Attack Father     Other Daughter         anaphylactic reaction to anesthesia      Social History     Socioeconomic History    Marital status:      Spouse name: Not on file    Number of children: Not on file    Years of education: Not on file    Highest education level: Not on file   Occupational History    Not on file   Tobacco Use    Smoking status: Never    Smokeless tobacco: Never   Vaping Use    Vaping Use: Never used   Substance and Sexual Activity    Alcohol use: Yes     Comment: 3 Drinks per month    Drug use: No    Sexual activity: Not Currently   Other Topics Concern    Not on file   Social History Narrative    Not on file     Social Determinants of Health     Financial Resource Strain: Not on file   Food Insecurity: Not on file   Transportation Needs: Not on file   Physical Activity: Not on file   Stress: Not on file   Social Connections: Not on file   Intimate Partner Violence: Not on file   Housing Stability: Not on file     Prior to Admission medications    Medication Sig Start Date End Date Taking? Authorizing Provider   levothyroxine (SYNTHROID) 150 mcg tablet TAKE 1 TABLET DAILY BEFORE BREAKFAST (DOSE INCREASE PER LABS ON 7/29/22) 1/11/23  Yes Meena Ramirez MD   atorvastatin (LIPITOR) 20 mg tablet Take 1 Tablet by mouth daily. 11/11/22  Yes Meena Ramirez MD   calcium carbonate (CALCIUM 300 PO) Take 1 Tablet by mouth daily. Yes Provider, Historical   sertraline (ZOLOFT) 100 mg tablet Take 1 Tablet by mouth daily. 2/23/22  Yes Meena Ramirez MD   diclofenac EC (VOLTAREN) 75 mg EC tablet TAKE 1 TABLET BY MOUTH TWICE A DAY 7/19/21  Yes Meena Ramirez MD   multivitamin (ONE A DAY) tablet Take 1 Tablet by mouth daily. Yes Provider, Historical   cefUROXime (CEFTIN) 500 mg tablet Take 1 Tablet by mouth two (2) times a day.   Patient not taking: Reported on 2/8/2023 1/6/23   Meena Ramirez MD        Review of Systems              Constitutional:  She denies fever, weight loss, sweats or fatigue. EYES: No blurred or double vision,               ENT: no nasal congestion, no headache or dizziness. No difficulty with               swallowing, taste, speech or smell. Respiratory:  No cough, wheezing or shortness of breath. No sputum production. Cardiac:  Denies chest pain, palpitations, unexplained indigestion, syncope, edema, PND or orthopnea. GI:  No changes in bowel movements, no abdominal pain, no bloating, anorexia, nausea, vomiting or heartburn. :  No frequency or dysuria. Denies incontinence or sexual dysfunction. Extremities:  No joint pain, stiffness or swelling  Back:.no pain or soreness  Skin:  No recent rashes or mole changes. Neurological:  No numbness, tingling, burning paresthesias or loss of motor strength. No syncope, dizziness, frequent headaches or memory loss. Hematologic:  No easy bruising  Lymphatic: No lymph node enlargement    Objective:     Vitals:    04/20/23 1109   BP: 116/75   Pulse: 69   Resp: 16   Temp: 97.7 °F (36.5 °C)   TempSrc: Oral   SpO2: 98%   Weight: 110 lb 6.4 oz (50.1 kg)   Height: 4' 10\" (1.473 m)   PainSc:   3   PainLoc: Hand       Body mass index is 23.07 kg/m². Physical Examination:              General Appearance:  Well-developed, well-nourished, no acute distress. HEENT:      Ears:  The TMs and ear canals were clear. Eyes:  The pupillary responses were normal.  Extraocular muscle function intact. Lids and conjunctiva not injected. Funduscopic exam revealed sharp disc margins. Nares: Clear w/o edema or erythema  Pharynx:  Clear with teeth in good repair. No masses were noted. Neck:  Supple without thyromegaly or adenopathy. No JVD noted. No carotid                bruits. Lungs:  Clear to auscultation and percussion. Cardiac:  Regular rate and rhythm without murmur. PMI not displaced. No gallop, rub or click.   Abdominal: Soft, non-tender, no hepata-spleenomegally or masses  Extremities:  No clubbing, cyanosis or edema. Skin:  No rash or unusual mole changes noted. Lymph Nodes:  None felt in the cervical, supraclavicular, axillary or inguinal region. Neurological: . DTRs 2+ and symmetric. Station and gait normal.   Hematologic:   No purpura or petechiae        Assessment/Plan:         1. Carpal tunnel syndrome of left wrist        Impressions/Plan:  Impression  1. Tingling left hand distribution consistent with carpal tunnel. We recommend wrist splint that is not effective we will set her up for hand specialty evaluation. Recheck as previously scheduled    No orders of the defined types were placed in this encounter. No results found for any visits on 04/20/23. Hank Gold MD    The patient was given after the visit summary the patient verbalized an understanding of the plans and problems as explained.

## 2023-04-24 PROBLEM — Z01.810 PRE-OPERATIVE CARDIOVASCULAR EXAMINATION: Status: ACTIVE | Noted: 2023-04-24

## 2023-04-24 PROBLEM — F33.1 DEPRESSION, MAJOR, RECURRENT, MODERATE (HCC): Status: RESOLVED | Noted: 2020-06-17 | Resolved: 2023-04-24

## 2023-04-24 NOTE — PROGRESS NOTES
Pre-op Exam       HPI:     Liza Cartwright is a 75y.o. year old female who is here for preoperative medical consultation and clearance before planned bilateral cataract surgery to be done by Dr. Amy Miller at [address] [place] [address]. She is to have the first done on May 4 and the second about 2 weeks later. She currently is followed by me for hyperlipidemia, DJD, CKD stage III and other medical problems. She currently denies any chest pain, shortness of breath, palpitations, PND, orthopnea or other cardiac or respiratory complaints. She notes no current GI or  complaints. She has no headaches, dizziness or neurologic complaints. She has no current active arthritic complaints and there are no other complaints on complete review of systems other than decreased visual acuity particularly in the evening hours. Visit Vitals  /79 (BP 1 Location: Left upper arm, BP Patient Position: Sitting)   Pulse 65   Temp 97.7 °F (36.5 °C) (Oral)   Resp 16   Ht 4' 10\" (1.473 m)   Wt 112 lb 6.4 oz (51 kg)   SpO2 97%   BMI 23.49 kg/m²       Past Medical History:   Diagnosis Date    Adverse effect of anesthesia     Pt states her daughter had an Anaphylactic reaction to Anesthesia    Arthritis     Back pain     Hypertension     Single episode    Menopause     Neck pain     Psychiatric disorder     Depression, Anxiety    Thyroid disease        Past Surgical History:   Procedure Laterality Date    COLONOSCOPY N/A 3/22/2022    COLONOSCOPY performed by Sandra Knight MD at hospitals ENDOSCOPY    HX BACK SURGERY      c4-5 fusion    HX ORTHOPAEDIC      Rotator Cuff Repair, Right    HX ORTHOPAEDIC      Carpal Tunnel Repair, Right    MI COLON CA SCRN NOT HI RSK IND  3/22/2022            Prior to Admission medications    Medication Sig Start Date End Date Taking?  Authorizing Provider   levothyroxine (SYNTHROID) 150 mcg tablet TAKE 1 TABLET DAILY BEFORE BREAKFAST (DOSE INCREASE PER LABS ON 7/29/22) 1/11/23  Yes Katherine Portillo MD   atorvastatin (LIPITOR) 20 mg tablet Take 1 Tablet by mouth daily. 11/11/22  Yes Katherine Portillo MD   calcium carbonate (CALCIUM 300 PO) Take 1 Tablet by mouth daily. Yes Provider, Historical   sertraline (ZOLOFT) 100 mg tablet Take 1 Tablet by mouth daily. 2/23/22  Yes Katherine Portillo MD   diclofenac EC (VOLTAREN) 75 mg EC tablet TAKE 1 TABLET BY MOUTH TWICE A DAY 7/19/21  Yes Katherine Portillo MD   multivitamin (ONE A DAY) tablet Take 1 Tablet by mouth daily. Yes Provider, Historical        No Known Allergies     Family History   Problem Relation Age of Onset    Breast Cancer Paternal Grandmother         over 48    Stroke Mother     Heart Attack Father     Other Daughter         anaphylactic reaction to anesthesia       Social History     Socioeconomic History    Marital status:      Spouse name: Not on file    Number of children: Not on file    Years of education: Not on file    Highest education level: Not on file   Occupational History    Not on file   Tobacco Use    Smoking status: Never    Smokeless tobacco: Never   Vaping Use    Vaping Use: Never used   Substance and Sexual Activity    Alcohol use: Yes     Comment: 3 Drinks per month    Drug use: No    Sexual activity: Not Currently   Other Topics Concern    Not on file   Social History Narrative    Not on file     Social Determinants of Health     Financial Resource Strain: Not on file   Food Insecurity: Not on file   Transportation Needs: Not on file   Physical Activity: Not on file   Stress: Not on file   Social Connections: Not on file   Intimate Partner Violence: Not on file   Housing Stability: Not on file        ROS:     Constitutional: She denies fevers, weight loss, sweats. Eyes: No blurred or double vision. ENT: No difficulty with swallowing, taste, speech or smell. NECK: no stiffness swelling or lymph node enlargement  Respiratory: No cough wheezing or shortness of breath.   Cardiovascular: Denies chest pain, palpitations, unexplained indigestion or syncope. Breast: She has noted no masses or lumps and no discharge or axillary swelling  Gastrointestinal:  No changes in bowel movements, no abdominal pain, no bloating. Genitourinary: No discharge or abnormal bleeding or pain  Extremities: No joint pain, stiffness or swelling. Neurological:  No numbness, tingling, burring paresthesias or loss of motor strength. No syncope, dizziness or frequent headache  Skin:  No recent rashes or mole changes. Psychiatric/Behavioral:  Negative for depression. The patient is not nervous/anxious. HEMATOLOGIC: no easy bruising or bleeding gums  Endocrine: no sweats of urinary frequency or excessive thirst      Physical Examination:     Vitals:    04/25/23 1008 04/25/23 1013   BP: (!) 142/85 138/79   Pulse: 65    Resp: 16    Temp: 97.7 °F (36.5 °C)    TempSrc: Oral    SpO2: 97%    Weight: 112 lb 6.4 oz (51 kg)    Height: 4' 10\" (1.473 m)    PainSc:   0 - No pain         General appearance - alert, well appearing, and in no distress  Mental status - alert, oriented to person, place, and time  HEENT:  Ears - bilateral TM's and external ear canals clear  Eyes - pupillary responses were normal.  Extraocular muscle function intact. Lids and conjunctiva not injected. Fundoscopic exam revealed sharp disc margins. eye movements intact  Pharynx- clear with teeth in good repair. No masses were noted  Neck - supple without thyromegaly or burit. No JVD noted  Lungs - clear to auscultation and percussion  Cardiac- normal rate, regular rhythm without murmurs. PMI not displaced. No gallop, rub or click  Abdomen - flat, soft, non-tender without palpable organomegaly or mass. No pulsatile mass was felt, and not bruit was heard.   Bowel sounds were active  Extremities -  no clubbing cyanosis or edema  Lymphatics - no palpable lymphadenopathy, no hepatosplenomegaly  Peripheral vascular - Dorsalis pedis and posterior tibial pulses felt without difficulty  Skin - no rash or unusual mole change noted  Neurological - Cranial nerves II-XII grossly intact. Motor strength 5/5. DTR's 2+ and symmetric. Station and gait normal  Back exam - full range of motion, no tenderness, palpable spasm or pain on motion  Musculoskeletal - no joint tenderness, deformity or swelling  Hematologic: no purpura, petechiae or bruising    Assessment/Plan:     1. Pre-operative cardiovascular examination    2. Age-related cataract of both eyes, unspecified age-related cataract type    3. Mixed hyperlipidemia    4. Stage 3 chronic kidney disease, unspecified whether stage 3a or 3b CKD ([facility] [address]    5. Primary osteoarthritis involving multiple joints        Impression  1. Preoperative cardiovascular examination completed today. 2.  Bilateral cataracts with surgery scheduled as noted on May 4 and May 18  3. Hyperlipidemia prior lab reviewed repeat status pending  4. CKD stage III repeat status pending  5. DJD clinically stable  She is medically stable for the planned cataract surgery. Copy to Dr. Jinny Knowles    No orders of the defined types were placed in this encounter. No results found for any visits on 04/25/23. I have reviewed the patient's medical history in detail and updated the computerized patient record. We had a prolonged discussion about these complex clinical issues and went over the various important aspects to consider. All questions were answered. Advised her to call back or return to office if symptoms do not improve, change in nature, or persist.    She was given an after visit summary or informed of [facility] which includes patient instructions, diagnoses, current medications, & vitals. She expressed understanding with the diagnosis and plan.       Phoenix Mendez MD

## 2023-04-25 ENCOUNTER — OFFICE VISIT (OUTPATIENT)
Dept: INTERNAL MEDICINE CLINIC | Age: 68
End: 2023-04-25
Payer: MEDICARE

## 2023-04-25 VITALS
HEIGHT: 58 IN | WEIGHT: 112.4 LBS | SYSTOLIC BLOOD PRESSURE: 138 MMHG | HEART RATE: 65 BPM | OXYGEN SATURATION: 97 % | DIASTOLIC BLOOD PRESSURE: 79 MMHG | RESPIRATION RATE: 16 BRPM | BODY MASS INDEX: 23.59 KG/M2 | TEMPERATURE: 97.7 F

## 2023-04-25 DIAGNOSIS — Z01.810 PRE-OPERATIVE CARDIOVASCULAR EXAMINATION: Primary | ICD-10-CM

## 2023-04-25 DIAGNOSIS — E78.2 MIXED HYPERLIPIDEMIA: ICD-10-CM

## 2023-04-25 DIAGNOSIS — N18.30 STAGE 3 CHRONIC KIDNEY DISEASE, UNSPECIFIED WHETHER STAGE 3A OR 3B CKD (HCC): ICD-10-CM

## 2023-04-25 DIAGNOSIS — H25.9 AGE-RELATED CATARACT OF BOTH EYES, UNSPECIFIED AGE-RELATED CATARACT TYPE: ICD-10-CM

## 2023-04-25 DIAGNOSIS — M15.9 PRIMARY OSTEOARTHRITIS INVOLVING MULTIPLE JOINTS: ICD-10-CM

## 2023-04-25 PROCEDURE — G9899 SCRN MAM PERF RSLTS DOC: HCPCS | Performed by: INTERNAL MEDICINE

## 2023-04-25 PROCEDURE — G8420 CALC BMI NORM PARAMETERS: HCPCS | Performed by: INTERNAL MEDICINE

## 2023-04-25 PROCEDURE — 99214 OFFICE O/P EST MOD 30 MIN: CPT | Performed by: INTERNAL MEDICINE

## 2023-04-25 PROCEDURE — G8510 SCR DEP NEG, NO PLAN REQD: HCPCS | Performed by: INTERNAL MEDICINE

## 2023-04-25 PROCEDURE — G8427 DOCREV CUR MEDS BY ELIG CLIN: HCPCS | Performed by: INTERNAL MEDICINE

## 2023-04-25 PROCEDURE — 1123F ACP DISCUSS/DSCN MKR DOCD: CPT | Performed by: INTERNAL MEDICINE

## 2023-04-25 PROCEDURE — 3017F COLORECTAL CA SCREEN DOC REV: CPT | Performed by: INTERNAL MEDICINE

## 2023-04-25 PROCEDURE — 1101F PT FALLS ASSESS-DOCD LE1/YR: CPT | Performed by: INTERNAL MEDICINE

## 2023-04-25 PROCEDURE — 1090F PRES/ABSN URINE INCON ASSESS: CPT | Performed by: INTERNAL MEDICINE

## 2023-04-25 PROCEDURE — G8536 NO DOC ELDER MAL SCRN: HCPCS | Performed by: INTERNAL MEDICINE

## 2023-04-25 NOTE — PROGRESS NOTES
Chief Complaint   Patient presents with    Pre-op Exam     Pt is having cataract surgery on left eye with [address] on 5/4/2023. 1. \"Have you been to the ER, urgent care clinic since your last visit? Hospitalized since your last visit? \" No    2. \"Have you seen or consulted any other health care providers outside of the [address] [place] [address] since your last visit? \" No     3. For patients aged 39-70: Has the patient had a colonoscopy / FIT/ Cologuard? Yes - no Care Gap present      If the patient is female:    4. For patients aged 41-77: Has the patient had a mammogram within the past 2 years? Yes - no Care Gap present      5. For patients aged 21-65: Has the patient had a pap smear?  Yes - no Care Gap present    Visit Vitals  /79 (BP 1 Location: Left upper arm, BP Patient Position: Sitting)   Pulse 65   Temp 97.7 °F (36.5 °C) (Oral)   Resp 16   Ht 4' 10\" (1.473 m)   Wt 112 lb 6.4 oz (51 kg)   SpO2 97%   BMI 23.49 kg/m²

## 2023-04-28 RX ORDER — SERTRALINE HYDROCHLORIDE 100 MG/1
TABLET, FILM COATED ORAL
Qty: 90 TABLET | Refills: 3 | Status: SHIPPED | OUTPATIENT
Start: 2023-04-28

## 2023-05-10 PROBLEM — N18.30 STAGE 3 CHRONIC KIDNEY DISEASE (HCC): Status: ACTIVE | Noted: 2018-06-19

## 2023-05-10 PROBLEM — M81.0 AGE-RELATED OSTEOPOROSIS WITHOUT CURRENT PATHOLOGICAL FRACTURE: Status: ACTIVE | Noted: 2018-04-18

## 2023-05-10 PROBLEM — R74.8 ELEVATED LIVER ENZYMES: Status: ACTIVE | Noted: 2018-06-19

## 2023-05-10 PROBLEM — F51.01 PRIMARY INSOMNIA: Status: ACTIVE | Noted: 2020-10-19

## 2023-05-10 PROBLEM — E55.9 VITAMIN D DEFICIENCY: Status: ACTIVE | Noted: 2022-07-28

## 2023-05-10 PROBLEM — E78.2 MIXED HYPERLIPIDEMIA: Status: ACTIVE | Noted: 2018-06-19

## 2023-05-10 PROBLEM — M15.9 PRIMARY OSTEOARTHRITIS INVOLVING MULTIPLE JOINTS: Status: ACTIVE | Noted: 2018-04-18

## 2023-05-10 PROBLEM — M15.0 PRIMARY OSTEOARTHRITIS INVOLVING MULTIPLE JOINTS: Status: ACTIVE | Noted: 2018-04-18

## 2023-05-11 ENCOUNTER — OFFICE VISIT (OUTPATIENT)
Facility: CLINIC | Age: 68
End: 2023-05-11
Payer: MEDICARE

## 2023-05-11 VITALS
WEIGHT: 110.7 LBS | TEMPERATURE: 97.8 F | DIASTOLIC BLOOD PRESSURE: 76 MMHG | HEIGHT: 58 IN | OXYGEN SATURATION: 97 % | HEART RATE: 74 BPM | SYSTOLIC BLOOD PRESSURE: 122 MMHG | BODY MASS INDEX: 23.24 KG/M2 | RESPIRATION RATE: 16 BRPM

## 2023-05-11 DIAGNOSIS — N18.30 STAGE 3 CHRONIC KIDNEY DISEASE, UNSPECIFIED WHETHER STAGE 3A OR 3B CKD (HCC): ICD-10-CM

## 2023-05-11 DIAGNOSIS — M15.9 PRIMARY OSTEOARTHRITIS INVOLVING MULTIPLE JOINTS: ICD-10-CM

## 2023-05-11 DIAGNOSIS — E78.2 MIXED HYPERLIPIDEMIA: Primary | ICD-10-CM

## 2023-05-11 DIAGNOSIS — F51.01 PRIMARY INSOMNIA: ICD-10-CM

## 2023-05-11 DIAGNOSIS — J01.00 ACUTE NON-RECURRENT MAXILLARY SINUSITIS: ICD-10-CM

## 2023-05-11 DIAGNOSIS — M81.0 AGE-RELATED OSTEOPOROSIS WITHOUT CURRENT PATHOLOGICAL FRACTURE: ICD-10-CM

## 2023-05-11 DIAGNOSIS — R74.8 ELEVATED LIVER ENZYMES: ICD-10-CM

## 2023-05-11 PROCEDURE — G8420 CALC BMI NORM PARAMETERS: HCPCS | Performed by: INTERNAL MEDICINE

## 2023-05-11 PROCEDURE — 1036F TOBACCO NON-USER: CPT | Performed by: INTERNAL MEDICINE

## 2023-05-11 PROCEDURE — 1090F PRES/ABSN URINE INCON ASSESS: CPT | Performed by: INTERNAL MEDICINE

## 2023-05-11 PROCEDURE — 99214 OFFICE O/P EST MOD 30 MIN: CPT | Performed by: INTERNAL MEDICINE

## 2023-05-11 PROCEDURE — 1123F ACP DISCUSS/DSCN MKR DOCD: CPT | Performed by: INTERNAL MEDICINE

## 2023-05-11 PROCEDURE — G8427 DOCREV CUR MEDS BY ELIG CLIN: HCPCS | Performed by: INTERNAL MEDICINE

## 2023-05-11 PROCEDURE — G8399 PT W/DXA RESULTS DOCUMENT: HCPCS | Performed by: INTERNAL MEDICINE

## 2023-05-11 PROCEDURE — 3017F COLORECTAL CA SCREEN DOC REV: CPT | Performed by: INTERNAL MEDICINE

## 2023-05-11 RX ORDER — AZITHROMYCIN 250 MG/1
250 TABLET, FILM COATED ORAL SEE ADMIN INSTRUCTIONS
Qty: 6 TABLET | Refills: 0 | Status: SHIPPED | OUTPATIENT
Start: 2023-05-11 | End: 2023-05-16

## 2023-05-11 ASSESSMENT — PATIENT HEALTH QUESTIONNAIRE - PHQ9
SUM OF ALL RESPONSES TO PHQ QUESTIONS 1-9: 0
1. LITTLE INTEREST OR PLEASURE IN DOING THINGS: 0
SUM OF ALL RESPONSES TO PHQ QUESTIONS 1-9: 0
SUM OF ALL RESPONSES TO PHQ QUESTIONS 1-9: 0
SUM OF ALL RESPONSES TO PHQ9 QUESTIONS 1 & 2: 0
SUM OF ALL RESPONSES TO PHQ QUESTIONS 1-9: 0
2. FEELING DOWN, DEPRESSED OR HOPELESS: 0

## 2023-05-11 NOTE — PROGRESS NOTES
Nhung Yoon is a 75 y.o. female presenting for Follow-up Chronic Condition (3 mo fu)  . 1. Have you been to the ER, urgent care clinic since your last visit? Hospitalized since your last visit? No    2. Have you seen or consulted any other health care providers outside of the [address] [place] [address] since your last visit? Include any pap smears or colon screening.   Kiet Gould Dr

## 2023-05-11 NOTE — PROGRESS NOTES
Chief Complaint   Patient presents with    Follow-up Chronic Condition     3 mo fu       SUBJECTIVE:    Dayana Jaime is a 75 y.o. female who returns in follow-up for medical problems include hypertension, hyperlipidemia, DJD, CKD stage III, and other multimedical problems. She did recently just have injection for a trigger finger in her right hand and carpal tunnel in the left and she thinks it may be helping. She currently denies any chest pain, shortness of breath, palpitations, PND, orthopnea or other cardiac or respiratory complaints. She notes no current active GI or  complaints. She has no headaches, dizziness or neurologic complaints. Now with the injection of her hands she currently does not have any active arthritic complaints although she does have her chronic joint aches and pains have not changed. She does want me to review her recent bone density with her. The remainder complete review of systems is negative. Current Outpatient Medications   Medication Sig Dispense Refill    Cyanocobalamin (B-12 PO) Take 5,000 mcg by mouth daily      Multiple Vitamin (MULTIVITAMIN PO) Take by mouth daily      azithromycin (ZITHROMAX) 250 MG tablet Take 1 tablet by mouth See Admin Instructions for 5 days 500mg on day 1 followed by 250mg on days 2 - 5 6 tablet 0    atorvastatin (LIPITOR) 20 MG tablet Take by mouth daily      diclofenac (VOLTAREN) 75 MG EC tablet TAKE 1 TABLET BY MOUTH TWICE A DAY      levothyroxine (SYNTHROID) 150 MCG tablet TAKE 1 TABLET DAILY BEFORE BREAKFAST (DOSE INCREASE PER LABS ON 7/29/22)      sertraline (ZOLOFT) 100 MG tablet Take by mouth daily       No current facility-administered medications for this visit.      Past Medical History:   Diagnosis Date    Adverse effect of anesthesia     Pt states her daughter had an Anaphylactic reaction to Anesthesia    Arthritis     Back pain     Hypertension     Single episode    Menopause     Neck pain     Psychiatric disorder

## 2023-05-12 LAB
ALBUMIN SERPL-MCNC: 4.3 G/DL (ref 3.5–5)
ALBUMIN/GLOB SERPL: 1.3 (ref 1.1–2.2)
ALP SERPL-CCNC: 63 U/L (ref 45–117)
ALT SERPL-CCNC: 45 U/L (ref 12–78)
ANION GAP SERPL CALC-SCNC: 4 MMOL/L (ref 5–15)
AST SERPL-CCNC: 31 U/L (ref 15–37)
BILIRUB SERPL-MCNC: 0.5 MG/DL (ref 0.2–1)
BUN SERPL-MCNC: 25 MG/DL (ref 6–20)
BUN/CREAT SERPL: 26 (ref 12–20)
CALCIUM SERPL-MCNC: 9.6 MG/DL (ref 8.5–10.1)
CHLORIDE SERPL-SCNC: 105 MMOL/L (ref 97–108)
CHOLEST SERPL-MCNC: 228 MG/DL
CK SERPL-CCNC: 110 U/L (ref 26–192)
CO2 SERPL-SCNC: 27 MMOL/L (ref 21–32)
COMMENT:: NORMAL
CREAT SERPL-MCNC: 0.95 MG/DL (ref 0.55–1.02)
GLOBULIN SER CALC-MCNC: 3.3 G/DL (ref 2–4)
GLUCOSE SERPL-MCNC: 129 MG/DL (ref 65–100)
HDLC SERPL-MCNC: 122 MG/DL
HDLC SERPL: 1.9 (ref 0–5)
LDLC SERPL CALC-MCNC: 98.8 MG/DL (ref 0–100)
POTASSIUM SERPL-SCNC: 4.3 MMOL/L (ref 3.5–5.1)
PROT SERPL-MCNC: 7.6 G/DL (ref 6.4–8.2)
SODIUM SERPL-SCNC: 136 MMOL/L (ref 136–145)
SPECIMEN HOLD: NORMAL
TRIGL SERPL-MCNC: 36 MG/DL
VLDLC SERPL CALC-MCNC: 7.2 MG/DL

## 2023-05-24 ENCOUNTER — TELEPHONE (OUTPATIENT)
Facility: CLINIC | Age: 68
End: 2023-05-24

## 2023-05-24 PROBLEM — Z01.810 PRE-OPERATIVE CARDIOVASCULAR EXAMINATION: Status: RESOLVED | Noted: 2023-04-24 | Resolved: 2023-05-24

## 2023-05-24 NOTE — TELEPHONE ENCOUNTER
Called patient back. She states that the infusion center has not called her regarding an appointment for the Prolia injection. Told her I would refax the order to them. Asked her to call back in a couple of days if no contact has been made.

## 2023-05-26 RX ORDER — ALBUTEROL SULFATE 90 UG/1
4 AEROSOL, METERED RESPIRATORY (INHALATION) PRN
OUTPATIENT
Start: 2023-06-05

## 2023-05-26 RX ORDER — ACETAMINOPHEN 325 MG/1
650 TABLET ORAL
OUTPATIENT
Start: 2023-06-05

## 2023-05-26 RX ORDER — DIPHENHYDRAMINE HYDROCHLORIDE 50 MG/ML
50 INJECTION INTRAMUSCULAR; INTRAVENOUS
OUTPATIENT
Start: 2023-06-05

## 2023-05-26 RX ORDER — ONDANSETRON 2 MG/ML
8 INJECTION INTRAMUSCULAR; INTRAVENOUS
OUTPATIENT
Start: 2023-06-05

## 2023-05-26 RX ORDER — EPINEPHRINE 1 MG/ML
0.3 INJECTION, SOLUTION, CONCENTRATE INTRAVENOUS PRN
OUTPATIENT
Start: 2023-06-05

## 2023-05-26 RX ORDER — SODIUM CHLORIDE 9 MG/ML
INJECTION, SOLUTION INTRAVENOUS CONTINUOUS
OUTPATIENT
Start: 2023-06-05

## 2023-06-05 ENCOUNTER — HOSPITAL ENCOUNTER (OUTPATIENT)
Facility: HOSPITAL | Age: 68
Setting detail: INFUSION SERIES
End: 2023-06-05
Payer: MEDICARE

## 2023-06-05 VITALS
BODY MASS INDEX: 23.22 KG/M2 | WEIGHT: 111.1 LBS | SYSTOLIC BLOOD PRESSURE: 110 MMHG | TEMPERATURE: 98.8 F | OXYGEN SATURATION: 96 % | DIASTOLIC BLOOD PRESSURE: 73 MMHG | HEART RATE: 83 BPM | RESPIRATION RATE: 16 BRPM

## 2023-06-05 DIAGNOSIS — M81.0 AGE-RELATED OSTEOPOROSIS WITHOUT CURRENT PATHOLOGICAL FRACTURE: Primary | ICD-10-CM

## 2023-06-05 PROCEDURE — 6360000002 HC RX W HCPCS: Performed by: INTERNAL MEDICINE

## 2023-06-05 PROCEDURE — 96372 THER/PROPH/DIAG INJ SC/IM: CPT

## 2023-06-05 RX ORDER — DIPHENHYDRAMINE HYDROCHLORIDE 50 MG/ML
50 INJECTION INTRAMUSCULAR; INTRAVENOUS
OUTPATIENT
Start: 2023-12-03

## 2023-06-05 RX ORDER — ESCITALOPRAM OXALATE 20 MG/1
TABLET ORAL
COMMUNITY
Start: 2017-06-20

## 2023-06-05 RX ORDER — MULTIVITAMIN
1 TABLET ORAL DAILY
COMMUNITY
End: 2023-08-14 | Stop reason: ALTCHOICE

## 2023-06-05 RX ORDER — ALBUTEROL SULFATE 90 UG/1
4 AEROSOL, METERED RESPIRATORY (INHALATION) PRN
OUTPATIENT
Start: 2023-12-03

## 2023-06-05 RX ORDER — ACETAMINOPHEN 325 MG/1
650 TABLET ORAL
COMMUNITY

## 2023-06-05 RX ORDER — EPINEPHRINE 1 MG/ML
0.3 INJECTION, SOLUTION, CONCENTRATE INTRAVENOUS PRN
OUTPATIENT
Start: 2023-12-03

## 2023-06-05 RX ORDER — SODIUM CHLORIDE 9 MG/ML
INJECTION, SOLUTION INTRAVENOUS CONTINUOUS
OUTPATIENT
Start: 2023-12-03

## 2023-06-05 RX ORDER — ONDANSETRON 2 MG/ML
8 INJECTION INTRAMUSCULAR; INTRAVENOUS
OUTPATIENT
Start: 2023-12-03

## 2023-06-05 RX ORDER — ACETAMINOPHEN 325 MG/1
650 TABLET ORAL
OUTPATIENT
Start: 2023-12-03

## 2023-06-05 RX ADMIN — DENOSUMAB 60 MG: 60 INJECTION SUBCUTANEOUS at 16:18

## 2023-06-05 NOTE — PROGRESS NOTES
[facility] Visit Note:  Arrived - 1600 for Prolia    Patient denied having any symptoms of COVID-19, i.e. SOB, coughing, fever, or generally not feeling well. /73   Pulse 83   Temp 98.8 °F (37.1 °C) (Temporal)   Resp 16   Wt 50.4 kg (111 lb 1.6 oz)   SpO2 96%   BMI 23.22 kg/m²     Assessment unremarkable except fatigue and productive cough. Reviewed Prolia info. Pt denies any recent or upcoming dental work. Labs obtained on 5/11- Calcium 9.6    Medication given:  Prolia 60mg SQ in left arm    1620 - Tolerated well. No reaction noted. Reviewed Prolia D/C instructions. Verbalized understanding. Pt denies any acute problems/changes. Discharged from [facility] ambulatory. No distress. Next appt: 12/4/23 @ 1600.

## 2023-06-28 RX ORDER — LEVOTHYROXINE SODIUM 0.15 MG/1
TABLET ORAL
Qty: 90 TABLET | Refills: 3 | Status: SHIPPED | OUTPATIENT
Start: 2023-06-28

## 2023-07-07 ENCOUNTER — OFFICE VISIT (OUTPATIENT)
Facility: CLINIC | Age: 68
End: 2023-07-07

## 2023-07-07 VITALS
OXYGEN SATURATION: 97 % | SYSTOLIC BLOOD PRESSURE: 118 MMHG | BODY MASS INDEX: 23.91 KG/M2 | HEART RATE: 68 BPM | TEMPERATURE: 97.8 F | WEIGHT: 113.9 LBS | HEIGHT: 58 IN | DIASTOLIC BLOOD PRESSURE: 74 MMHG | RESPIRATION RATE: 18 BRPM

## 2023-07-07 DIAGNOSIS — L23.9 ALLERGIC DERMATITIS: Primary | ICD-10-CM

## 2023-07-07 RX ORDER — HYDROXYZINE HYDROCHLORIDE 25 MG/1
25 TABLET, FILM COATED ORAL 3 TIMES DAILY PRN
COMMUNITY

## 2023-07-07 RX ORDER — METHYLPREDNISOLONE 4 MG/1
4 TABLET ORAL DAILY
COMMUNITY

## 2023-07-07 RX ORDER — METHYLPREDNISOLONE ACETATE 40 MG/ML
40 INJECTION, SUSPENSION INTRA-ARTICULAR; INTRALESIONAL; INTRAMUSCULAR; SOFT TISSUE ONCE
Status: COMPLETED | OUTPATIENT
Start: 2023-07-07 | End: 2023-07-07

## 2023-07-07 RX ADMIN — METHYLPREDNISOLONE ACETATE 40 MG: 40 INJECTION, SUSPENSION INTRA-ARTICULAR; INTRALESIONAL; INTRAMUSCULAR; SOFT TISSUE at 13:07

## 2023-07-07 SDOH — ECONOMIC STABILITY: INCOME INSECURITY: HOW HARD IS IT FOR YOU TO PAY FOR THE VERY BASICS LIKE FOOD, HOUSING, MEDICAL CARE, AND HEATING?: NOT HARD AT ALL

## 2023-07-07 SDOH — ECONOMIC STABILITY: FOOD INSECURITY: WITHIN THE PAST 12 MONTHS, YOU WORRIED THAT YOUR FOOD WOULD RUN OUT BEFORE YOU GOT MONEY TO BUY MORE.: NEVER TRUE

## 2023-07-07 SDOH — ECONOMIC STABILITY: HOUSING INSECURITY
IN THE LAST 12 MONTHS, WAS THERE A TIME WHEN YOU DID NOT HAVE A STEADY PLACE TO SLEEP OR SLEPT IN A SHELTER (INCLUDING NOW)?: NO

## 2023-07-07 SDOH — ECONOMIC STABILITY: FOOD INSECURITY: WITHIN THE PAST 12 MONTHS, THE FOOD YOU BOUGHT JUST DIDN'T LAST AND YOU DIDN'T HAVE MONEY TO GET MORE.: NEVER TRUE

## 2023-07-07 NOTE — PROGRESS NOTES
Subjective:   Kim Jung is a 75 y.o. female      Chief Complaint   Patient presents with    Rash        History of present illness: She presents today complaining of diffuse pruritic erythematous rash involving arms legs as well as in the torso. She is not aware of any new allergens that she has been exposed to has taken no new medications.     Patient Active Problem List   Diagnosis    Acquired hypothyroidism    Primary osteoarthritis involving multiple joints    Acute cystitis    Common bile duct dilation    Cervical radiculopathy due to degenerative joint disease of spine    Herniation of cervical intervertebral disc    Headache    Acute midline low back pain without sciatica    Primary insomnia    Alcohol screening    S/P cervical spinal fusion    Age-related osteoporosis without current pathological fracture    Degenerative disc disease, cervical    Mixed hyperlipidemia    Elevated liver enzymes    Cervical spinal stenosis    Stage 3 chronic kidney disease (HCC)    Primary osteoarthritis of left hip    Vitamin D deficiency    Ganglion cyst of finger    Acute bronchitis    Carpal tunnel syndrome of left wrist    Age-related cataract of both eyes      Past Medical History:   Diagnosis Date    Adverse effect of anesthesia     Pt states her daughter had an Anaphylactic reaction to Anesthesia    Arthritis     Back pain     Hypertension     Single episode    Menopause     Neck pain     Psychiatric disorder     Depression, Anxiety    Thyroid disease       No Known Allergies   Family History   Problem Relation Age of Onset    Heart Attack Father     Other Daughter         anaphylactic reaction to anesthesia    Stroke Mother     Breast Cancer Paternal Grandmother         over 48      Social History     Socioeconomic History    Marital status:      Spouse name: Not on file    Number of children: Not on file    Years of education: Not on file    Highest education level: Not on file   Occupational History

## 2023-08-13 PROBLEM — Z00.00 MEDICARE ANNUAL WELLNESS VISIT, SUBSEQUENT: Status: ACTIVE | Noted: 2023-08-13

## 2023-08-13 PROBLEM — M54.50 ACUTE MIDLINE LOW BACK PAIN WITHOUT SCIATICA: Status: RESOLVED | Noted: 2022-03-07 | Resolved: 2023-08-13

## 2023-08-13 PROBLEM — M50.30 DEGENERATIVE DISC DISEASE, CERVICAL: Status: ACTIVE | Noted: 2019-01-21

## 2023-08-13 PROBLEM — E03.9 ACQUIRED HYPOTHYROIDISM: Status: ACTIVE | Noted: 2018-04-18

## 2023-08-14 ENCOUNTER — OFFICE VISIT (OUTPATIENT)
Facility: CLINIC | Age: 68
End: 2023-08-14
Payer: MEDICARE

## 2023-08-14 VITALS
OXYGEN SATURATION: 96 % | SYSTOLIC BLOOD PRESSURE: 101 MMHG | HEIGHT: 58 IN | TEMPERATURE: 98.1 F | WEIGHT: 110.1 LBS | HEART RATE: 76 BPM | DIASTOLIC BLOOD PRESSURE: 80 MMHG | RESPIRATION RATE: 18 BRPM | BODY MASS INDEX: 23.11 KG/M2

## 2023-08-14 DIAGNOSIS — M15.9 PRIMARY OSTEOARTHRITIS INVOLVING MULTIPLE JOINTS: ICD-10-CM

## 2023-08-14 DIAGNOSIS — M81.0 AGE-RELATED OSTEOPOROSIS WITHOUT CURRENT PATHOLOGICAL FRACTURE: ICD-10-CM

## 2023-08-14 DIAGNOSIS — R74.8 ELEVATED LIVER ENZYMES: ICD-10-CM

## 2023-08-14 DIAGNOSIS — E78.2 MIXED HYPERLIPIDEMIA: Primary | ICD-10-CM

## 2023-08-14 DIAGNOSIS — E55.9 VITAMIN D DEFICIENCY: ICD-10-CM

## 2023-08-14 DIAGNOSIS — Z13.39 ALCOHOL SCREENING: ICD-10-CM

## 2023-08-14 DIAGNOSIS — N18.30 STAGE 3 CHRONIC KIDNEY DISEASE, UNSPECIFIED WHETHER STAGE 3A OR 3B CKD (HCC): ICD-10-CM

## 2023-08-14 DIAGNOSIS — F51.01 PRIMARY INSOMNIA: ICD-10-CM

## 2023-08-14 DIAGNOSIS — Z00.00 MEDICARE ANNUAL WELLNESS VISIT, SUBSEQUENT: ICD-10-CM

## 2023-08-14 DIAGNOSIS — E03.9 ACQUIRED HYPOTHYROIDISM: ICD-10-CM

## 2023-08-14 DIAGNOSIS — M50.30 DEGENERATIVE DISC DISEASE, CERVICAL: ICD-10-CM

## 2023-08-14 LAB
25(OH)D3 SERPL-MCNC: 45.8 NG/ML (ref 30–100)
ALBUMIN SERPL-MCNC: 4.5 G/DL (ref 3.5–5)
ALBUMIN/GLOB SERPL: 1.4 (ref 1.1–2.2)
ALP SERPL-CCNC: 48 U/L (ref 45–117)
ALT SERPL-CCNC: 44 U/L (ref 12–78)
ANION GAP SERPL CALC-SCNC: 6 MMOL/L (ref 5–15)
APPEARANCE UR: CLEAR
AST SERPL-CCNC: 33 U/L (ref 15–37)
BACTERIA URNS QL MICRO: NEGATIVE /HPF
BASOPHILS # BLD: 0.1 K/UL (ref 0–0.1)
BASOPHILS NFR BLD: 1 % (ref 0–1)
BILIRUB SERPL-MCNC: 0.5 MG/DL (ref 0.2–1)
BILIRUB UR QL: NEGATIVE
BUN SERPL-MCNC: 18 MG/DL (ref 6–20)
BUN/CREAT SERPL: 20 (ref 12–20)
CALCIUM SERPL-MCNC: 9.4 MG/DL (ref 8.5–10.1)
CHLORIDE SERPL-SCNC: 106 MMOL/L (ref 97–108)
CHOLEST SERPL-MCNC: 255 MG/DL
CK SERPL-CCNC: 184 U/L (ref 26–192)
CO2 SERPL-SCNC: 27 MMOL/L (ref 21–32)
COLOR UR: NORMAL
CREAT SERPL-MCNC: 0.91 MG/DL (ref 0.55–1.02)
DIFFERENTIAL METHOD BLD: NORMAL
EOSINOPHIL # BLD: 0.2 K/UL (ref 0–0.4)
EOSINOPHIL NFR BLD: 4 % (ref 0–7)
EPITH CASTS URNS QL MICRO: NORMAL /LPF
ERYTHROCYTE [DISTWIDTH] IN BLOOD BY AUTOMATED COUNT: 13.9 % (ref 11.5–14.5)
GLOBULIN SER CALC-MCNC: 3.3 G/DL (ref 2–4)
GLUCOSE SERPL-MCNC: 110 MG/DL (ref 65–100)
GLUCOSE UR STRIP.AUTO-MCNC: NEGATIVE MG/DL
HCT VFR BLD AUTO: 43.6 % (ref 35–47)
HDLC SERPL-MCNC: 110 MG/DL
HDLC SERPL: 2.3 (ref 0–5)
HGB BLD-MCNC: 14 G/DL (ref 11.5–16)
HGB UR QL STRIP: NEGATIVE
HYALINE CASTS URNS QL MICRO: NORMAL /LPF (ref 0–5)
IMM GRANULOCYTES # BLD AUTO: 0 K/UL (ref 0–0.04)
IMM GRANULOCYTES NFR BLD AUTO: 0 % (ref 0–0.5)
KETONES UR QL STRIP.AUTO: NEGATIVE MG/DL
LDLC SERPL CALC-MCNC: 125 MG/DL (ref 0–100)
LEUKOCYTE ESTERASE UR QL STRIP.AUTO: NEGATIVE
LYMPHOCYTES # BLD: 2.7 K/UL (ref 0.8–3.5)
LYMPHOCYTES NFR BLD: 42 % (ref 12–49)
MCH RBC QN AUTO: 30.7 PG (ref 26–34)
MCHC RBC AUTO-ENTMCNC: 32.1 G/DL (ref 30–36.5)
MCV RBC AUTO: 95.6 FL (ref 80–99)
MONOCYTES # BLD: 0.5 K/UL (ref 0–1)
MONOCYTES NFR BLD: 8 % (ref 5–13)
NEUTS SEG # BLD: 3 K/UL (ref 1.8–8)
NEUTS SEG NFR BLD: 45 % (ref 32–75)
NITRITE UR QL STRIP.AUTO: NEGATIVE
NRBC # BLD: 0 K/UL (ref 0–0.01)
NRBC BLD-RTO: 0 PER 100 WBC
PH UR STRIP: 6 (ref 5–8)
PLATELET # BLD AUTO: 299 K/UL (ref 150–400)
PMV BLD AUTO: 10.3 FL (ref 8.9–12.9)
POTASSIUM SERPL-SCNC: 4.2 MMOL/L (ref 3.5–5.1)
PROT SERPL-MCNC: 7.8 G/DL (ref 6.4–8.2)
PROT UR STRIP-MCNC: NEGATIVE MG/DL
RBC # BLD AUTO: 4.56 M/UL (ref 3.8–5.2)
RBC #/AREA URNS HPF: NORMAL /HPF (ref 0–5)
SODIUM SERPL-SCNC: 139 MMOL/L (ref 136–145)
SP GR UR REFRACTOMETRY: 1.01 (ref 1–1.03)
T4 FREE SERPL-MCNC: 1.5 NG/DL (ref 0.8–1.5)
TRIGL SERPL-MCNC: 100 MG/DL
TSH SERPL DL<=0.05 MIU/L-ACNC: 0.06 UIU/ML (ref 0.36–3.74)
UROBILINOGEN UR QL STRIP.AUTO: 0.2 EU/DL (ref 0.2–1)
VLDLC SERPL CALC-MCNC: 20 MG/DL
WBC # BLD AUTO: 6.5 K/UL (ref 3.6–11)
WBC URNS QL MICRO: NORMAL /HPF (ref 0–4)

## 2023-08-14 PROCEDURE — G8399 PT W/DXA RESULTS DOCUMENT: HCPCS | Performed by: INTERNAL MEDICINE

## 2023-08-14 PROCEDURE — 99214 OFFICE O/P EST MOD 30 MIN: CPT | Performed by: INTERNAL MEDICINE

## 2023-08-14 PROCEDURE — G8420 CALC BMI NORM PARAMETERS: HCPCS | Performed by: INTERNAL MEDICINE

## 2023-08-14 PROCEDURE — G8427 DOCREV CUR MEDS BY ELIG CLIN: HCPCS | Performed by: INTERNAL MEDICINE

## 2023-08-14 PROCEDURE — 3017F COLORECTAL CA SCREEN DOC REV: CPT | Performed by: INTERNAL MEDICINE

## 2023-08-14 PROCEDURE — 1036F TOBACCO NON-USER: CPT | Performed by: INTERNAL MEDICINE

## 2023-08-14 PROCEDURE — G0442 ANNUAL ALCOHOL SCREEN 15 MIN: HCPCS | Performed by: INTERNAL MEDICINE

## 2023-08-14 PROCEDURE — 1090F PRES/ABSN URINE INCON ASSESS: CPT | Performed by: INTERNAL MEDICINE

## 2023-08-14 PROCEDURE — 1123F ACP DISCUSS/DSCN MKR DOCD: CPT | Performed by: INTERNAL MEDICINE

## 2023-08-14 PROCEDURE — G0439 PPPS, SUBSEQ VISIT: HCPCS | Performed by: INTERNAL MEDICINE

## 2023-08-14 ASSESSMENT — PATIENT HEALTH QUESTIONNAIRE - PHQ9
SUM OF ALL RESPONSES TO PHQ9 QUESTIONS 1 & 2: 4
SUM OF ALL RESPONSES TO PHQ QUESTIONS 1-9: 6
1. LITTLE INTEREST OR PLEASURE IN DOING THINGS: 2
3. TROUBLE FALLING OR STAYING ASLEEP: 1
SUM OF ALL RESPONSES TO PHQ QUESTIONS 1-9: 6
2. FEELING DOWN, DEPRESSED OR HOPELESS: 2
SUM OF ALL RESPONSES TO PHQ QUESTIONS 1-9: 6
8. MOVING OR SPEAKING SO SLOWLY THAT OTHER PEOPLE COULD HAVE NOTICED. OR THE OPPOSITE, BEING SO FIGETY OR RESTLESS THAT YOU HAVE BEEN MOVING AROUND A LOT MORE THAN USUAL: 0
4. FEELING TIRED OR HAVING LITTLE ENERGY: 1
9. THOUGHTS THAT YOU WOULD BE BETTER OFF DEAD, OR OF HURTING YOURSELF: 0
10. IF YOU CHECKED OFF ANY PROBLEMS, HOW DIFFICULT HAVE THESE PROBLEMS MADE IT FOR YOU TO DO YOUR WORK, TAKE CARE OF THINGS AT HOME, OR GET ALONG WITH OTHER PEOPLE: 0
7. TROUBLE CONCENTRATING ON THINGS, SUCH AS READING THE NEWSPAPER OR WATCHING TELEVISION: 0
SUM OF ALL RESPONSES TO PHQ QUESTIONS 1-9: 6
5. POOR APPETITE OR OVEREATING: 0
6. FEELING BAD ABOUT YOURSELF - OR THAT YOU ARE A FAILURE OR HAVE LET YOURSELF OR YOUR FAMILY DOWN: 0

## 2023-08-14 ASSESSMENT — LIFESTYLE VARIABLES
HOW OFTEN DO YOU HAVE A DRINK CONTAINING ALCOHOL: MONTHLY OR LESS
HOW MANY STANDARD DRINKS CONTAINING ALCOHOL DO YOU HAVE ON A TYPICAL DAY: 1 OR 2

## 2023-08-14 NOTE — PROGRESS NOTES
Medicare Annual Wellness Visit    Samina Quigley is here for Medicare AWV    Assessment & Plan   Mixed hyperlipidemia  -     TSH; Future  -     T4, Free; Future  -     Lipid Panel; Future  -     CK; Future  -     Comprehensive Metabolic Panel; Future  Primary osteoarthritis involving multiple joints  -     Urinalysis with Microscopic; Future  -     CBC with Auto Differential; Future  Stage 3 chronic kidney disease, unspecified whether stage 3a or 3b CKD (HCC)  -     CBC with Auto Differential; Future  Acquired hypothyroidism  Degenerative disc disease, cervical  Elevated liver enzymes  Primary insomnia  Age-related osteoporosis without current pathological fracture  Vitamin D deficiency  -     Vitamin D 25 Hydroxy; Future  Alcohol screening  -     [place] ANNUAL ALCOHOL SCREEN 15 MIN  Medicare annual wellness visit, subsequent    ASSESSMENT:   1. Mixed hyperlipidemia    2. Primary osteoarthritis involving multiple joints    3. Stage 3 chronic kidney disease, unspecified whether stage 3a or 3b CKD ([address])    4. Acquired hypothyroidism    5. Degenerative disc disease, cervical    6. Elevated liver enzymes    7. Primary insomnia    8. Age-related osteoporosis without current pathological fracture    9. Vitamin D deficiency    10. Alcohol screening    11. Medicare annual wellness visit, subsequent      Impression  1. Hyperlipidemia prior lab reviewed and repeat status is pending  2. DJD that is stable  3. CKD stage III repeat status is pending   4. Hypothyroidism repeat status pending prior lab reviewed  5. Cervical DJD status post surgery  6. Prior elevated liver enzymes repeat status pending   7. History of insomnia seems to be controlled  8   Osteoporosis up-to-date on bone density reviewed and did have her most recent injection. 9.  Vitamin D deficiency repeat status pending  10. Annual alcohol screening is done and she does drink less than 1 drink per day average.   She occasion will have 2 at 1 setting but that

## 2023-08-22 RX ORDER — ATORVASTATIN CALCIUM 20 MG/1
40 TABLET, FILM COATED ORAL DAILY
Qty: 90 TABLET | Refills: 3 | Status: SHIPPED | OUTPATIENT
Start: 2023-08-22

## 2023-08-22 RX ORDER — LEVOTHYROXINE SODIUM 137 UG/1
TABLET ORAL
Qty: 90 TABLET | Refills: 3 | Status: SHIPPED | OUTPATIENT
Start: 2023-08-22

## 2023-08-22 NOTE — TELEPHONE ENCOUNTER
RX refill request from the patient/pharmacy. Patient last seen 08- with labs, and next appt. scheduled for 11-  Requested Prescriptions     Pending Prescriptions Disp Refills    atorvastatin (LIPITOR) 20 MG tablet 90 tablet 3     Sig: Take 2 tablets by mouth daily    levothyroxine (SYNTHROID) 137 MCG tablet 90 tablet 3     Sig: TAKE 1 TABLET DAILY BEFORE BREAKFAST (DOSE INCREASE PER LABS ON 7/29/22)    .

## 2023-09-12 PROBLEM — Z00.00 MEDICARE ANNUAL WELLNESS VISIT, SUBSEQUENT: Status: RESOLVED | Noted: 2023-08-13 | Resolved: 2023-09-12

## 2023-09-19 NOTE — H&P
ASSESSMENT:  1. Carpal tunnel syndrome of left wrist              Patient Active Problem List   Diagnosis    Acute bronchitis    Acute midline low back pain without sciatica    Age-related cataract of both eyes    Carpal tunnel syndrome of left wrist    Ganglion cyst of finger    Headache    Postmenopausal osteoporosis    Primary insomnia    Primary osteoarthritis of left hip    Vitamin D deficiency         PLAN:  Treatment Plan:  I recommend surgical management. Left endoscopic carpal tunnel release under local anesthesia. We discussed reasonable expectations and we discussed usual postop course. She has given informed consent to proceed     No orders of the defined types were placed in this encounter. Follow-up: Return for first postoperative visit. HISTORY OF PRESENT ILLNESS:  Chief Complaint: Carpal Tunnel of the Left Hand   Age: 75 y.o. Sex: female   History of present illness: Jeny Minaya  is a pleasant 75 y.o. female who presents today for evaluation of left hand. She complains of recurrence of numbness and tingling. She was seen in May. She was given a cortisone injection. This provided excellent relief of symptoms for 3 months. She is recently had recurrence. Significant nighttime symptoms     She reports her trigger finger is doing well after cortisone injection     Past medical history, past surgical history, medications, allergies, social history, and review of systems have been reviewed by me. No current facility-administered medications on file prior to encounter.      Current Outpatient Medications on File Prior to Encounter   Medication Sig Dispense Refill    atorvastatin (LIPITOR) 20 MG tablet Take 2 tablets by mouth daily 90 tablet 3    levothyroxine (SYNTHROID) 137 MCG tablet TAKE 1 TABLET DAILY BEFORE BREAKFAST (DOSE INCREASE PER LABS ON 7/29/22) 90 tablet 3    hydrOXYzine HCl (ATARAX) 25 MG tablet Take 1 tablet by mouth 3 times daily as needed for Itching

## 2023-09-20 ENCOUNTER — HOSPITAL ENCOUNTER (OUTPATIENT)
Facility: HOSPITAL | Age: 68
Setting detail: OUTPATIENT SURGERY
Discharge: HOME | End: 2023-09-20
Attending: ORTHOPAEDIC SURGERY | Admitting: ORTHOPAEDIC SURGERY
Payer: MEDICARE

## 2023-09-20 VITALS
HEART RATE: 64 BPM | OXYGEN SATURATION: 98 % | TEMPERATURE: 97.8 F | WEIGHT: 110.23 LBS | RESPIRATION RATE: 18 BRPM | DIASTOLIC BLOOD PRESSURE: 69 MMHG | SYSTOLIC BLOOD PRESSURE: 143 MMHG | HEIGHT: 58 IN | BODY MASS INDEX: 23.14 KG/M2

## 2023-09-20 DIAGNOSIS — G56.02 CARPAL TUNNEL SYNDROME OF LEFT WRIST: Primary | ICD-10-CM

## 2023-09-20 PROCEDURE — 7100000011 HC PHASE II RECOVERY - ADDTL 15 MIN: Performed by: ORTHOPAEDIC SURGERY

## 2023-09-20 PROCEDURE — 3600000002 HC SURGERY LEVEL 2 BASE: Performed by: ORTHOPAEDIC SURGERY

## 2023-09-20 PROCEDURE — 2709999900 HC NON-CHARGEABLE SUPPLY: Performed by: ORTHOPAEDIC SURGERY

## 2023-09-20 PROCEDURE — 2500000003 HC RX 250 WO HCPCS: Performed by: ORTHOPAEDIC SURGERY

## 2023-09-20 PROCEDURE — 2720000010 HC SURG SUPPLY STERILE: Performed by: ORTHOPAEDIC SURGERY

## 2023-09-20 PROCEDURE — 3600000012 HC SURGERY LEVEL 2 ADDTL 15MIN: Performed by: ORTHOPAEDIC SURGERY

## 2023-09-20 PROCEDURE — 6360000002 HC RX W HCPCS: Performed by: ORTHOPAEDIC SURGERY

## 2023-09-20 PROCEDURE — 7100000010 HC PHASE II RECOVERY - FIRST 15 MIN: Performed by: ORTHOPAEDIC SURGERY

## 2023-09-20 RX ORDER — TRAMADOL HYDROCHLORIDE 50 MG/1
50 TABLET ORAL EVERY 6 HOURS PRN
Qty: 12 TABLET | Refills: 0 | Status: SHIPPED | OUTPATIENT
Start: 2023-09-20 | End: 2023-09-23

## 2023-09-20 NOTE — OP NOTE
50% distal aspect of the ligament was transected. The   endoscope was then placed back into the carpal canal. Good release was   evident. The remaining 50% was then transected with the endoscope. The   endoscope was removed. The wound was thoroughly irrigated. . The wound was then closed   with 4-0 nylon sutures. Sterile dressings were applied. The tourniquet was let down and brisk cap refill returned to the digits. The patient tolerated the entire procedure well without complications. MAGALY Cintron present for soft tissue retraction during the case, and for assistance with wound closure at the end of the case.

## 2023-09-20 NOTE — DISCHARGE INSTRUCTIONS
Dr. Carrie Floyd Postoperative Instructions    Please maintain the dressing placed at surgery for 5 days. You may remove it in 5 days. Please keep the dressing clean and dry for 5 days. In 5 days you may get the wound wet and then cover it with a bandaid. If you have any questions or problems with your dressing, please call our office at [phone]. Please elevate the operative extremity to the level of the heart to keep swelling at a minimum. You may get up to move around, but when seated please keep the extremity elevated as much as possible. This will decrease swelling and postoperative pain. You may do activities as tolerated. You may ice your arm/hand 5 times a day for 20 minutes at a time. A prescription for pain medication has been sent to your pharmacy. Take it as needed. You may also use over the counter medications for pain. Signs and symptoms of infection include: redness, increased pain, increased swelling not relieved by elevation, drainage, fever, or chills, If you develop any of these symptoms, call the office at [phone]. Please Follow-up at my office 14 days after surgery or when specified at your pre-operative appointment.

## 2023-11-29 ENCOUNTER — OFFICE VISIT (OUTPATIENT)
Facility: CLINIC | Age: 68
End: 2023-11-29
Payer: MEDICARE

## 2023-11-29 VITALS
RESPIRATION RATE: 17 BRPM | TEMPERATURE: 98.1 F | HEIGHT: 58 IN | DIASTOLIC BLOOD PRESSURE: 77 MMHG | OXYGEN SATURATION: 95 % | BODY MASS INDEX: 24.04 KG/M2 | WEIGHT: 114.5 LBS | SYSTOLIC BLOOD PRESSURE: 117 MMHG

## 2023-11-29 DIAGNOSIS — M15.9 PRIMARY OSTEOARTHRITIS INVOLVING MULTIPLE JOINTS: ICD-10-CM

## 2023-11-29 DIAGNOSIS — E78.2 MIXED HYPERLIPIDEMIA: Primary | ICD-10-CM

## 2023-11-29 DIAGNOSIS — J06.9 UPPER RESPIRATORY TRACT INFECTION, UNSPECIFIED TYPE: ICD-10-CM

## 2023-11-29 DIAGNOSIS — R74.8 ELEVATED LIVER ENZYMES: ICD-10-CM

## 2023-11-29 DIAGNOSIS — N18.30 STAGE 3 CHRONIC KIDNEY DISEASE, UNSPECIFIED WHETHER STAGE 3A OR 3B CKD (HCC): ICD-10-CM

## 2023-11-29 PROCEDURE — 1123F ACP DISCUSS/DSCN MKR DOCD: CPT | Performed by: INTERNAL MEDICINE

## 2023-11-29 PROCEDURE — 90694 VACC AIIV4 NO PRSRV 0.5ML IM: CPT | Performed by: INTERNAL MEDICINE

## 2023-11-29 PROCEDURE — G8427 DOCREV CUR MEDS BY ELIG CLIN: HCPCS | Performed by: INTERNAL MEDICINE

## 2023-11-29 PROCEDURE — 1090F PRES/ABSN URINE INCON ASSESS: CPT | Performed by: INTERNAL MEDICINE

## 2023-11-29 PROCEDURE — G0008 ADMIN INFLUENZA VIRUS VAC: HCPCS | Performed by: INTERNAL MEDICINE

## 2023-11-29 PROCEDURE — G8399 PT W/DXA RESULTS DOCUMENT: HCPCS | Performed by: INTERNAL MEDICINE

## 2023-11-29 PROCEDURE — 99214 OFFICE O/P EST MOD 30 MIN: CPT | Performed by: INTERNAL MEDICINE

## 2023-11-29 PROCEDURE — G8420 CALC BMI NORM PARAMETERS: HCPCS | Performed by: INTERNAL MEDICINE

## 2023-11-29 PROCEDURE — 1036F TOBACCO NON-USER: CPT | Performed by: INTERNAL MEDICINE

## 2023-11-29 PROCEDURE — 3017F COLORECTAL CA SCREEN DOC REV: CPT | Performed by: INTERNAL MEDICINE

## 2023-11-29 PROCEDURE — G8484 FLU IMMUNIZE NO ADMIN: HCPCS | Performed by: INTERNAL MEDICINE

## 2023-11-29 RX ORDER — AZITHROMYCIN 250 MG/1
250 TABLET, FILM COATED ORAL SEE ADMIN INSTRUCTIONS
Qty: 6 TABLET | Refills: 0 | Status: SHIPPED | OUTPATIENT
Start: 2023-11-29 | End: 2023-12-04

## 2023-11-30 LAB
ALBUMIN SERPL-MCNC: 4.6 G/DL (ref 3.5–5)
ALBUMIN/GLOB SERPL: 1.3 (ref 1.1–2.2)
ALP SERPL-CCNC: 54 U/L (ref 45–117)
ALT SERPL-CCNC: 40 U/L (ref 12–78)
ANION GAP SERPL CALC-SCNC: 9 MMOL/L (ref 5–15)
AST SERPL-CCNC: 31 U/L (ref 15–37)
BILIRUB SERPL-MCNC: 0.4 MG/DL (ref 0.2–1)
BUN SERPL-MCNC: 17 MG/DL (ref 6–20)
BUN/CREAT SERPL: 19 (ref 12–20)
CALCIUM SERPL-MCNC: 9.4 MG/DL (ref 8.5–10.1)
CHLORIDE SERPL-SCNC: 107 MMOL/L (ref 97–108)
CHOLEST SERPL-MCNC: 222 MG/DL
CK SERPL-CCNC: 168 U/L (ref 26–192)
CO2 SERPL-SCNC: 24 MMOL/L (ref 21–32)
CREAT SERPL-MCNC: 0.89 MG/DL (ref 0.55–1.02)
GLOBULIN SER CALC-MCNC: 3.6 G/DL (ref 2–4)
GLUCOSE SERPL-MCNC: 96 MG/DL (ref 65–100)
HDLC SERPL-MCNC: 126 MG/DL
HDLC SERPL: 1.8 (ref 0–5)
LDLC SERPL CALC-MCNC: 81.2 MG/DL (ref 0–100)
POTASSIUM SERPL-SCNC: 4 MMOL/L (ref 3.5–5.1)
PROT SERPL-MCNC: 8.2 G/DL (ref 6.4–8.2)
SODIUM SERPL-SCNC: 140 MMOL/L (ref 136–145)
TRIGL SERPL-MCNC: 74 MG/DL
VLDLC SERPL CALC-MCNC: 14.8 MG/DL

## 2023-12-04 ENCOUNTER — HOSPITAL ENCOUNTER (OUTPATIENT)
Facility: HOSPITAL | Age: 68
Setting detail: INFUSION SERIES
Discharge: HOME | End: 2023-12-04
Payer: MEDICARE

## 2023-12-04 VITALS
SYSTOLIC BLOOD PRESSURE: 107 MMHG | RESPIRATION RATE: 18 BRPM | OXYGEN SATURATION: 93 % | WEIGHT: 118 LBS | TEMPERATURE: 99 F | DIASTOLIC BLOOD PRESSURE: 62 MMHG | HEIGHT: 58 IN | HEART RATE: 74 BPM | BODY MASS INDEX: 24.77 KG/M2

## 2023-12-04 DIAGNOSIS — M81.0 AGE-RELATED OSTEOPOROSIS WITHOUT CURRENT PATHOLOGICAL FRACTURE: Primary | ICD-10-CM

## 2023-12-04 PROCEDURE — 96372 THER/PROPH/DIAG INJ SC/IM: CPT

## 2023-12-04 PROCEDURE — 6360000002 HC RX W HCPCS: Performed by: INTERNAL MEDICINE

## 2023-12-04 RX ORDER — ONDANSETRON 2 MG/ML
8 INJECTION INTRAMUSCULAR; INTRAVENOUS
OUTPATIENT
Start: 2024-06-02

## 2023-12-04 RX ORDER — ACETAMINOPHEN 325 MG/1
650 TABLET ORAL
OUTPATIENT
Start: 2024-06-02

## 2023-12-04 RX ORDER — EPINEPHRINE 1 MG/ML
0.3 INJECTION, SOLUTION INTRAMUSCULAR; SUBCUTANEOUS PRN
OUTPATIENT
Start: 2024-06-02

## 2023-12-04 RX ORDER — SODIUM CHLORIDE 9 MG/ML
INJECTION, SOLUTION INTRAVENOUS CONTINUOUS
OUTPATIENT
Start: 2024-06-02

## 2023-12-04 RX ORDER — ALBUTEROL SULFATE 90 UG/1
4 AEROSOL, METERED RESPIRATORY (INHALATION) PRN
OUTPATIENT
Start: 2024-06-02

## 2023-12-04 RX ORDER — DIPHENHYDRAMINE HYDROCHLORIDE 50 MG/ML
50 INJECTION INTRAMUSCULAR; INTRAVENOUS
OUTPATIENT
Start: 2024-06-02

## 2023-12-04 RX ADMIN — DENOSUMAB 60 MG: 60 INJECTION SUBCUTANEOUS at 16:08

## 2024-01-05 ENCOUNTER — OFFICE VISIT (OUTPATIENT)
Facility: CLINIC | Age: 69
End: 2024-01-05
Payer: MEDICARE

## 2024-01-05 VITALS
WEIGHT: 115.3 LBS | RESPIRATION RATE: 18 BRPM | SYSTOLIC BLOOD PRESSURE: 110 MMHG | DIASTOLIC BLOOD PRESSURE: 70 MMHG | OXYGEN SATURATION: 95 % | BODY MASS INDEX: 24.2 KG/M2 | TEMPERATURE: 98.2 F | HEART RATE: 82 BPM | HEIGHT: 58 IN

## 2024-01-05 DIAGNOSIS — N18.30 STAGE 3 CHRONIC KIDNEY DISEASE, UNSPECIFIED WHETHER STAGE 3A OR 3B CKD (HCC): ICD-10-CM

## 2024-01-05 DIAGNOSIS — E78.2 MIXED HYPERLIPIDEMIA: ICD-10-CM

## 2024-01-05 DIAGNOSIS — R25.2 LEG CRAMPS: ICD-10-CM

## 2024-01-05 DIAGNOSIS — F32.A DEPRESSION, UNSPECIFIED DEPRESSION TYPE: Primary | ICD-10-CM

## 2024-01-05 PROCEDURE — 3017F COLORECTAL CA SCREEN DOC REV: CPT | Performed by: INTERNAL MEDICINE

## 2024-01-05 PROCEDURE — G8427 DOCREV CUR MEDS BY ELIG CLIN: HCPCS | Performed by: INTERNAL MEDICINE

## 2024-01-05 PROCEDURE — 1123F ACP DISCUSS/DSCN MKR DOCD: CPT | Performed by: INTERNAL MEDICINE

## 2024-01-05 PROCEDURE — 99213 OFFICE O/P EST LOW 20 MIN: CPT | Performed by: INTERNAL MEDICINE

## 2024-01-05 PROCEDURE — G8420 CALC BMI NORM PARAMETERS: HCPCS | Performed by: INTERNAL MEDICINE

## 2024-01-05 PROCEDURE — 1036F TOBACCO NON-USER: CPT | Performed by: INTERNAL MEDICINE

## 2024-01-05 PROCEDURE — G8399 PT W/DXA RESULTS DOCUMENT: HCPCS | Performed by: INTERNAL MEDICINE

## 2024-01-05 PROCEDURE — G8484 FLU IMMUNIZE NO ADMIN: HCPCS | Performed by: INTERNAL MEDICINE

## 2024-01-05 PROCEDURE — 1090F PRES/ABSN URINE INCON ASSESS: CPT | Performed by: INTERNAL MEDICINE

## 2024-01-05 RX ORDER — SERTRALINE HYDROCHLORIDE 100 MG/1
200 TABLET, FILM COATED ORAL DAILY
Qty: 60 TABLET | Refills: 5 | Status: SHIPPED | OUTPATIENT
Start: 2024-01-05

## 2024-01-05 ASSESSMENT — PATIENT HEALTH QUESTIONNAIRE - PHQ9
SUM OF ALL RESPONSES TO PHQ QUESTIONS 1-9: 13
9. THOUGHTS THAT YOU WOULD BE BETTER OFF DEAD, OR OF HURTING YOURSELF: 0
6. FEELING BAD ABOUT YOURSELF - OR THAT YOU ARE A FAILURE OR HAVE LET YOURSELF OR YOUR FAMILY DOWN: 1
10. IF YOU CHECKED OFF ANY PROBLEMS, HOW DIFFICULT HAVE THESE PROBLEMS MADE IT FOR YOU TO DO YOUR WORK, TAKE CARE OF THINGS AT HOME, OR GET ALONG WITH OTHER PEOPLE: 1
4. FEELING TIRED OR HAVING LITTLE ENERGY: 2
SUM OF ALL RESPONSES TO PHQ9 QUESTIONS 1 & 2: 4
3. TROUBLE FALLING OR STAYING ASLEEP: 3
SUM OF ALL RESPONSES TO PHQ QUESTIONS 1-9: 13
7. TROUBLE CONCENTRATING ON THINGS, SUCH AS READING THE NEWSPAPER OR WATCHING TELEVISION: 3
SUM OF ALL RESPONSES TO PHQ QUESTIONS 1-9: 13
2. FEELING DOWN, DEPRESSED OR HOPELESS: 2
8. MOVING OR SPEAKING SO SLOWLY THAT OTHER PEOPLE COULD HAVE NOTICED. OR THE OPPOSITE, BEING SO FIGETY OR RESTLESS THAT YOU HAVE BEEN MOVING AROUND A LOT MORE THAN USUAL: 0
1. LITTLE INTEREST OR PLEASURE IN DOING THINGS: 2
5. POOR APPETITE OR OVEREATING: 0
SUM OF ALL RESPONSES TO PHQ QUESTIONS 1-9: 13

## 2024-01-05 NOTE — PROGRESS NOTES
Lena Elena is a 68 y.o. female     Chief Complaint   Patient presents with    Depression       /70 (Site: Left Upper Arm, Position: Sitting, Cuff Size: Large Adult)   Pulse 82   Temp 98.2 °F (36.8 °C) (Oral)   Resp 18   Ht 1.473 m (4' 10\")   Wt 52.3 kg (115 lb 4.8 oz)   SpO2 95%   BMI 24.10 kg/m²     Health Maintenance Due   Topic Date Due    DTaP/Tdap/Td vaccine (1 - Tdap) Never done    Shingles vaccine (1 of 2) Never done    Respiratory Syncytial Virus (RSV) Pregnant or age 60 yrs+ (1 - 1-dose 60+ series) Never done    COVID-19 Vaccine (4 - 2023-24 season) 09/01/2023    Annual Wellness Visit (Medicare Advantage)  01/01/2024         \"Have you been to the ER, urgent care clinic since your last visit?  Hospitalized since your last visit?\"    NO    “Have you seen or consulted any other health care providers outside of [facility] since your last visit?”    NO

## 2024-01-05 NOTE — PROGRESS NOTES
Subjective:   Lena Elena is a 68 y.o. female      Chief Complaint   Patient presents with    Depression        History of present illness: She presents in follow-up regarding her depression noted to depression is not controlled with the sertraline 100 mg daily.  We previously had on Lexapro 20 mg daily and switched her to sertraline.  She notes no suicidal thoughts or ideation she just does not seem to have a positive outlook.  She also notes that she has got arm cramps and hand cramps which seem to increase since we increased her Lipitor from 20 mg daily to 40 mg daily.  She has no other complaints.    Patient Active Problem List   Diagnosis    Acquired hypothyroidism    Primary osteoarthritis involving multiple joints    Acute cystitis    Common bile duct dilation    Cervical radiculopathy due to degenerative joint disease of spine    Herniation of cervical intervertebral disc    Headache    Primary insomnia    Alcohol screening    S/P cervical spinal fusion    Age-related osteoporosis without current pathological fracture    Degenerative disc disease, cervical    Mixed hyperlipidemia    Elevated liver enzymes    Cervical spinal stenosis    Stage 3 chronic kidney disease (HCC)    Primary osteoarthritis of left hip    Vitamin D deficiency    Ganglion cyst of finger    Acute bronchitis    Carpal tunnel syndrome of left wrist    Age-related cataract of both eyes    Depression    Leg cramps      Past Medical History:   Diagnosis Date    Adverse effect of anesthesia     Pt states her daughter had an Anaphylactic reaction to Anesthesia    Arthritis     Back pain     Hypertension     Single episode    Menopause     Neck pain     Psychiatric disorder     Depression, Anxiety    Thyroid disease       No Known Allergies   Family History   Problem Relation Age of Onset    Heart Attack Father     Other Daughter         anaphylactic reaction to anesthesia    Stroke Mother     Breast Cancer Paternal Grandmother         over

## 2024-01-29 RX ORDER — SERTRALINE HYDROCHLORIDE 100 MG/1
200 TABLET, FILM COATED ORAL DAILY
Qty: 180 TABLET | Refills: 1 | Status: SHIPPED | OUTPATIENT
Start: 2024-01-29

## 2024-01-29 NOTE — TELEPHONE ENCOUNTER
Chief Complaint   Patient presents with    Medication Refill       Requested Prescriptions     Pending Prescriptions Disp Refills    sertraline (ZOLOFT) 100 MG tablet 180 tablet 1     Sig: Take 2 tablets by mouth daily       Allergies:  No Known Allergies    Last visit with clinic:  1/5/2024   Next visit with clinic: 3/1/2024     Last visit with this provider: 1/5/2024   Next Visit with this provider: 3/1/2024    Signed by Torsten CISNEROS  01/29/24  4:44 PM

## 2024-02-26 ENCOUNTER — TRANSCRIBE ORDERS (OUTPATIENT)
Facility: HOSPITAL | Age: 69
End: 2024-02-26

## 2024-02-26 DIAGNOSIS — Z12.31 VISIT FOR SCREENING MAMMOGRAM: Primary | ICD-10-CM

## 2024-03-01 ENCOUNTER — OFFICE VISIT (OUTPATIENT)
Facility: CLINIC | Age: 69
End: 2024-03-01
Payer: MEDICARE

## 2024-03-01 VITALS
DIASTOLIC BLOOD PRESSURE: 73 MMHG | HEART RATE: 73 BPM | WEIGHT: 115.4 LBS | TEMPERATURE: 97.8 F | OXYGEN SATURATION: 95 % | BODY MASS INDEX: 24.22 KG/M2 | HEIGHT: 58 IN | SYSTOLIC BLOOD PRESSURE: 111 MMHG | RESPIRATION RATE: 16 BRPM

## 2024-03-01 DIAGNOSIS — N18.30 STAGE 3 CHRONIC KIDNEY DISEASE, UNSPECIFIED WHETHER STAGE 3A OR 3B CKD (HCC): ICD-10-CM

## 2024-03-01 DIAGNOSIS — E78.2 MIXED HYPERLIPIDEMIA: Primary | ICD-10-CM

## 2024-03-01 DIAGNOSIS — M54.50 CHRONIC MIDLINE LOW BACK PAIN WITHOUT SCIATICA: ICD-10-CM

## 2024-03-01 DIAGNOSIS — G89.29 CHRONIC MIDLINE LOW BACK PAIN WITHOUT SCIATICA: ICD-10-CM

## 2024-03-01 DIAGNOSIS — F51.01 PRIMARY INSOMNIA: ICD-10-CM

## 2024-03-01 DIAGNOSIS — M15.9 PRIMARY OSTEOARTHRITIS INVOLVING MULTIPLE JOINTS: ICD-10-CM

## 2024-03-01 LAB
ALBUMIN SERPL-MCNC: 4.4 G/DL (ref 3.5–5)
ALBUMIN/GLOB SERPL: 1.5 (ref 1.1–2.2)
ALP SERPL-CCNC: 51 U/L (ref 45–117)
ALT SERPL-CCNC: 27 U/L (ref 12–78)
ANION GAP SERPL CALC-SCNC: 4 MMOL/L (ref 5–15)
AST SERPL-CCNC: 23 U/L (ref 15–37)
BILIRUB SERPL-MCNC: 0.4 MG/DL (ref 0.2–1)
BUN SERPL-MCNC: 18 MG/DL (ref 6–20)
BUN/CREAT SERPL: 20 (ref 12–20)
CALCIUM SERPL-MCNC: 9 MG/DL (ref 8.5–10.1)
CHLORIDE SERPL-SCNC: 107 MMOL/L (ref 97–108)
CHOLEST SERPL-MCNC: 340 MG/DL
CK SERPL-CCNC: 144 U/L (ref 26–192)
CO2 SERPL-SCNC: 30 MMOL/L (ref 21–32)
CREAT SERPL-MCNC: 0.9 MG/DL (ref 0.55–1.02)
GLOBULIN SER CALC-MCNC: 3 G/DL (ref 2–4)
GLUCOSE SERPL-MCNC: 97 MG/DL (ref 65–100)
HDLC SERPL-MCNC: 109 MG/DL
HDLC SERPL: 3.1 (ref 0–5)
LDLC SERPL CALC-MCNC: 218 MG/DL (ref 0–100)
POTASSIUM SERPL-SCNC: 4.1 MMOL/L (ref 3.5–5.1)
PROT SERPL-MCNC: 7.4 G/DL (ref 6.4–8.2)
SODIUM SERPL-SCNC: 141 MMOL/L (ref 136–145)
TRIGL SERPL-MCNC: 65 MG/DL
VLDLC SERPL CALC-MCNC: 13 MG/DL

## 2024-03-01 PROCEDURE — 99214 OFFICE O/P EST MOD 30 MIN: CPT | Performed by: INTERNAL MEDICINE

## 2024-03-01 PROCEDURE — 1123F ACP DISCUSS/DSCN MKR DOCD: CPT | Performed by: INTERNAL MEDICINE

## 2024-03-01 PROCEDURE — G8484 FLU IMMUNIZE NO ADMIN: HCPCS | Performed by: INTERNAL MEDICINE

## 2024-03-01 PROCEDURE — 1036F TOBACCO NON-USER: CPT | Performed by: INTERNAL MEDICINE

## 2024-03-01 PROCEDURE — 3017F COLORECTAL CA SCREEN DOC REV: CPT | Performed by: INTERNAL MEDICINE

## 2024-03-01 PROCEDURE — 1090F PRES/ABSN URINE INCON ASSESS: CPT | Performed by: INTERNAL MEDICINE

## 2024-03-01 PROCEDURE — G8399 PT W/DXA RESULTS DOCUMENT: HCPCS | Performed by: INTERNAL MEDICINE

## 2024-03-01 PROCEDURE — G8420 CALC BMI NORM PARAMETERS: HCPCS | Performed by: INTERNAL MEDICINE

## 2024-03-01 PROCEDURE — G8428 CUR MEDS NOT DOCUMENT: HCPCS | Performed by: INTERNAL MEDICINE

## 2024-03-01 RX ORDER — SERTRALINE HYDROCHLORIDE 100 MG/1
TABLET, FILM COATED ORAL
Qty: 180 TABLET | Refills: 2 | Status: SHIPPED | OUTPATIENT
Start: 2024-03-01

## 2024-03-01 RX ORDER — SERTRALINE HYDROCHLORIDE 100 MG/1
100 TABLET, FILM COATED ORAL DAILY
Qty: 90 TABLET | Refills: 3 | Status: SHIPPED | OUTPATIENT
Start: 2024-03-01 | End: 2024-03-01 | Stop reason: SDUPTHER

## 2024-03-01 NOTE — TELEPHONE ENCOUNTER
Patient's Zoloft was increased from 100 MG to 200 MG last office visit:    Need to send updated Rx to Pharmacy- There isn't a 200 MG tablet. prescribe patient to take 2 100 MG for 90 days- 180 tabs    sertraline (ZOLOFT) 100 MG tablet [[id number]]     Order Details  Dose: 100 mg Route: Oral Frequency: DAILY   Dispense Quantity: 180 tablet Refills: 2          Sig: TAKE 2 TABLET BY MOUTH EVERY DAY         Start Date: 03/01/24 End Date: --   Written Date: 03/01/24 Expiration Date: 03/01/25   Original Order: sertraline (ZOLOFT) 100 MG tablet [[id number]]       Pharmacy    [id number] - [place], [place] - [address] - P [phone] - F [phone]  [address] [address]  Phone: [phone]  Fax: [phone]

## 2024-03-01 NOTE — TELEPHONE ENCOUNTER
Patient's Zoloft was increased from 100 MG to 200 MG last office visit:    PCP: Jordon Quispe MD    Last appt: 3/1/2024    Future Appointments   Date Time Provider Department Center   3/11/2024  3:00 PM [facility]   6/3/2024  4:00 PM [facility] [address] [place] [address]   6/4/2024  8:00 AM Jordon Quispe MD [facility]   12/2/2024  4:00 PM [facility] [address] [place] [address]       Requested Prescriptions     Pending Prescriptions Disp Refills    sertraline (ZOLOFT) 100 MG tablet 180 tablet 2     Sig: Take 2 tablets by mouth every day

## 2024-03-01 NOTE — PROGRESS NOTES
Lena Elena is a 68 y.o. female     Chief Complaint   Patient presents with    3 Month Follow-Up    Back Pain       /73 (Site: Left Upper Arm, Position: Sitting, Cuff Size: Small Adult)   Pulse 73   Temp 97.8 °F (36.6 °C) (Oral)   Resp 16   Ht 1.473 m (4' 10\")   Wt 52.3 kg (115 lb 6.4 oz)   SpO2 95%   BMI 24.12 kg/m²     Health Maintenance Due   Topic Date Due    DTaP/Tdap/Td vaccine (1 - Tdap) Never done    Shingles vaccine (1 of 2) Never done    Respiratory Syncytial Virus (RSV) Pregnant or age 60 yrs+ (1 - 1-dose 60+ series) Never done    COVID-19 Vaccine (4 - 2023-24 season) 09/01/2023    Annual Wellness Visit (Medicare Advantage)  01/01/2024         \"Have you been to the ER, urgent care clinic since your last visit?  Hospitalized since your last visit?\"    NO    “Have you seen or consulted any other health care providers outside of [facility] since your last visit?”    NO                     
Anxiety    Thyroid disease      Past Surgical History:   Procedure Laterality Date    BACK SURGERY      c4-5 fusion    CARPAL TUNNEL RELEASE Left 9/20/2023    LEFT ENDOSCOPIC CARPAL TUNNEL RELEASE performed by Navdeep Gee MD at [facility] AMBULATORY OR    COLON CA SCRN NOT HI RSK IND  3/22/2022         COLONOSCOPY N/A 3/22/2022    COLONOSCOPY performed by Kerwin Gonzalez Jr., MD at [facility] ENDOSCOPY    ORTHOPEDIC SURGERY      Rotator Cuff Repair, Right    ORTHOPEDIC SURGERY      Carpal Tunnel Repair, Right     No Known Allergies    REVIEW OF SYSTEMS:  General: negative for - chills or fever, or weight loss or gain  ENT: negative for - headaches, nasal congestion or tinnitus  Eyes: no blurred or visual changes  Neck: No stiffness or swollen nodes  Respiratory: negative for - cough, hemoptysis, shortness of breath or wheezing  Cardiovascular : negative for - chest pain, edema, palpitations or shortness of breath  Gastrointestinal: negative for - abdominal pain, blood in stools, heartburn or nausea/vomiting  Genito-Urinary: no dysuria, trouble voiding, or hematuria  Musculoskeletal: negative for - gait disturbance, joint pain, joint stiffness or joint swelling.  Possible low back pain as noted above  Neurological: no TIA or stroke symptoms  Hematologic: no bruises, no bleeding  Lymphatic: no swollen glands  Integument: no lumps, mole changes, nail changes or rash  Endocrine:no malaise/lethargy poly uria or polydipsia or unexpected weight changes        Social History     Socioeconomic History    Marital status:      Spouse name: None    Number of children: None    Years of education: None    Highest education level: None   Tobacco Use    Smoking status: Never    Smokeless tobacco: Never   Vaping Use    Vaping Use: Never used   Substance and Sexual Activity    Alcohol use: Yes    Drug use: No     Social Determinants of Health     Financial Resource Strain: Low Risk  (7/7/2023)    Overall Financial Resource

## 2024-03-11 ENCOUNTER — HOSPITAL ENCOUNTER (OUTPATIENT)
Facility: HOSPITAL | Age: 69
Discharge: HOME | End: 2024-03-14
Attending: INTERNAL MEDICINE
Payer: MEDICARE

## 2024-03-11 VITALS — WEIGHT: 115.3 LBS | HEIGHT: 58 IN | BODY MASS INDEX: 24.2 KG/M2

## 2024-03-11 DIAGNOSIS — Z12.31 VISIT FOR SCREENING MAMMOGRAM: ICD-10-CM

## 2024-03-11 PROCEDURE — 77063 BREAST TOMOSYNTHESIS BI: CPT

## 2024-06-03 ENCOUNTER — HOSPITAL ENCOUNTER (OUTPATIENT)
Facility: HOSPITAL | Age: 69
Setting detail: INFUSION SERIES
End: 2024-06-03

## 2024-06-17 ENCOUNTER — OFFICE VISIT (OUTPATIENT)
Facility: CLINIC | Age: 69
End: 2024-06-17
Payer: MEDICARE

## 2024-06-17 VITALS
HEART RATE: 69 BPM | WEIGHT: 115.7 LBS | BODY MASS INDEX: 24.29 KG/M2 | RESPIRATION RATE: 16 BRPM | OXYGEN SATURATION: 98 % | HEIGHT: 58 IN | DIASTOLIC BLOOD PRESSURE: 70 MMHG | TEMPERATURE: 97.8 F | SYSTOLIC BLOOD PRESSURE: 110 MMHG

## 2024-06-17 DIAGNOSIS — M15.9 PRIMARY OSTEOARTHRITIS INVOLVING MULTIPLE JOINTS: ICD-10-CM

## 2024-06-17 DIAGNOSIS — Z13.39 ALCOHOL SCREENING: ICD-10-CM

## 2024-06-17 DIAGNOSIS — M81.0 AGE-RELATED OSTEOPOROSIS WITHOUT CURRENT PATHOLOGICAL FRACTURE: ICD-10-CM

## 2024-06-17 DIAGNOSIS — R74.8 ELEVATED LIVER ENZYMES: ICD-10-CM

## 2024-06-17 DIAGNOSIS — Z00.00 MEDICARE ANNUAL WELLNESS VISIT, SUBSEQUENT: ICD-10-CM

## 2024-06-17 DIAGNOSIS — M50.30 DEGENERATIVE DISC DISEASE, CERVICAL: ICD-10-CM

## 2024-06-17 DIAGNOSIS — E78.2 MIXED HYPERLIPIDEMIA: Primary | ICD-10-CM

## 2024-06-17 DIAGNOSIS — N18.30 STAGE 3 CHRONIC KIDNEY DISEASE, UNSPECIFIED WHETHER STAGE 3A OR 3B CKD (HCC): ICD-10-CM

## 2024-06-17 DIAGNOSIS — E03.9 ACQUIRED HYPOTHYROIDISM: ICD-10-CM

## 2024-06-17 DIAGNOSIS — E55.9 VITAMIN D DEFICIENCY: ICD-10-CM

## 2024-06-17 DIAGNOSIS — F51.01 PRIMARY INSOMNIA: ICD-10-CM

## 2024-06-17 PROCEDURE — G0442 ANNUAL ALCOHOL SCREEN 15 MIN: HCPCS | Performed by: INTERNAL MEDICINE

## 2024-06-17 PROCEDURE — G8427 DOCREV CUR MEDS BY ELIG CLIN: HCPCS | Performed by: INTERNAL MEDICINE

## 2024-06-17 PROCEDURE — G8420 CALC BMI NORM PARAMETERS: HCPCS | Performed by: INTERNAL MEDICINE

## 2024-06-17 PROCEDURE — 1036F TOBACCO NON-USER: CPT | Performed by: INTERNAL MEDICINE

## 2024-06-17 PROCEDURE — G8399 PT W/DXA RESULTS DOCUMENT: HCPCS | Performed by: INTERNAL MEDICINE

## 2024-06-17 PROCEDURE — 1123F ACP DISCUSS/DSCN MKR DOCD: CPT | Performed by: INTERNAL MEDICINE

## 2024-06-17 PROCEDURE — 3017F COLORECTAL CA SCREEN DOC REV: CPT | Performed by: INTERNAL MEDICINE

## 2024-06-17 PROCEDURE — G0439 PPPS, SUBSEQ VISIT: HCPCS | Performed by: INTERNAL MEDICINE

## 2024-06-17 PROCEDURE — 99214 OFFICE O/P EST MOD 30 MIN: CPT | Performed by: INTERNAL MEDICINE

## 2024-06-17 PROCEDURE — 1090F PRES/ABSN URINE INCON ASSESS: CPT | Performed by: INTERNAL MEDICINE

## 2024-06-17 ASSESSMENT — PATIENT HEALTH QUESTIONNAIRE - PHQ9
SUM OF ALL RESPONSES TO PHQ QUESTIONS 1-9: 0
SUM OF ALL RESPONSES TO PHQ9 QUESTIONS 1 & 2: 0
10. IF YOU CHECKED OFF ANY PROBLEMS, HOW DIFFICULT HAVE THESE PROBLEMS MADE IT FOR YOU TO DO YOUR WORK, TAKE CARE OF THINGS AT HOME, OR GET ALONG WITH OTHER PEOPLE: NOT DIFFICULT AT ALL
3. TROUBLE FALLING OR STAYING ASLEEP: NOT AT ALL
SUM OF ALL RESPONSES TO PHQ QUESTIONS 1-9: 0
4. FEELING TIRED OR HAVING LITTLE ENERGY: NOT AT ALL
SUM OF ALL RESPONSES TO PHQ QUESTIONS 1-9: 0
5. POOR APPETITE OR OVEREATING: NOT AT ALL
SUM OF ALL RESPONSES TO PHQ QUESTIONS 1-9: 0
7. TROUBLE CONCENTRATING ON THINGS, SUCH AS READING THE NEWSPAPER OR WATCHING TELEVISION: NOT AT ALL
1. LITTLE INTEREST OR PLEASURE IN DOING THINGS: NOT AT ALL
9. THOUGHTS THAT YOU WOULD BE BETTER OFF DEAD, OR OF HURTING YOURSELF: NOT AT ALL
2. FEELING DOWN, DEPRESSED OR HOPELESS: NOT AT ALL
8. MOVING OR SPEAKING SO SLOWLY THAT OTHER PEOPLE COULD HAVE NOTICED. OR THE OPPOSITE, BEING SO FIGETY OR RESTLESS THAT YOU HAVE BEEN MOVING AROUND A LOT MORE THAN USUAL: NOT AT ALL

## 2024-06-17 NOTE — PROGRESS NOTES
Lena Elena is a 68 y.o. female     Chief Complaint   Patient presents with    Medicare AWV       /70 (Site: Left Upper Arm, Position: Sitting, Cuff Size: Large Adult)   Pulse 69   Temp 97.8 °F (36.6 °C) (Oral)   Resp 16   Ht 1.473 m (4' 9.99\")   Wt 52.5 kg (115 lb 11.2 oz)   SpO2 98%   BMI 24.19 kg/m²     Health Maintenance Due   Topic Date Due    DTaP/Tdap/Td vaccine (1 - Tdap) Never done    Shingles vaccine (1 of 2) Never done    Respiratory Syncytial Virus (RSV) Pregnant or age 60 yrs+ (1 - 1-dose 60+ series) Never done    COVID-19 Vaccine (4 - 2023-24 season) 09/01/2023    Annual Wellness Visit (Medicare Advantage)  01/01/2024         \"Have you been to the ER, urgent care clinic since your last visit?  Hospitalized since your last visit?\"    NO    “Have you seen or consulted any other health care providers outside of [facility] since your last visit?”    NO

## 2024-06-17 NOTE — PROGRESS NOTES
Medicare Annual Wellness Visit    Lena Elena is here for Medicare AWV    Assessment & Plan   Mixed hyperlipidemia  -     TSH; Future  -     T4, Free; Future  -     Lipid Panel; Future  -     CK; Future  -     Comprehensive Metabolic Panel; Future  Primary osteoarthritis involving multiple joints  -     Urinalysis with Microscopic; Future  -     CBC with Auto Differential; Future  Stage 3 chronic kidney disease, unspecified whether stage 3a or 3b CKD (HCC)  Degenerative disc disease, cervical  Acquired hypothyroidism  Elevated liver enzymes  Primary insomnia  Age-related osteoporosis without current pathological fracture  Vitamin D deficiency  -     Vitamin D 25 Hydroxy; Future  Alcohol screening  -     MD ANNUAL ALCOHOL SCREEN 15 MIN  Medicare annual wellness visit, subsequent    ASSESSMENT:   1. Mixed hyperlipidemia    2. Primary osteoarthritis involving multiple joints    3. Stage 3 chronic kidney disease, unspecified whether stage 3a or 3b CKD (HCC)    4. Degenerative disc disease, cervical    5. Acquired hypothyroidism    6. Elevated liver enzymes    7. Primary insomnia    8. Age-related osteoporosis without current pathological fracture    9. Vitamin D deficiency    10. Alcohol screening    11. Medicare annual wellness visit, subsequent      Impression  1 hyperlipidemia prior lab reviewed repeat status pending I will make adjustments if necessary.  2.  DJD that is stable  3.  CKD stage III repeat status pending  4 cervical degenerative disease status post treatment surgically  5 hypothyroid and repeat status pending  6 prior elevated liver enzymes repeat status pending  7 insomnia that is controlled  8.  Osteoporosis reviewed prior bone density  9 vitamin D deficiency repeat status pending  10 annual alcohol screening is done and should drink maybe a couple glasses of wine on the weekend.  We did spend 8 minutes discussing excessive alcohol intake and female to average more than 1 drink per day with

## 2024-06-18 LAB
25(OH)D3 SERPL-MCNC: 46.7 NG/ML (ref 30–100)
ALBUMIN SERPL-MCNC: 4.8 G/DL (ref 3.5–5)
ALBUMIN/GLOB SERPL: 1.4 (ref 1.1–2.2)
ALP SERPL-CCNC: 62 U/L (ref 45–117)
ALT SERPL-CCNC: 36 U/L (ref 12–78)
ANION GAP SERPL CALC-SCNC: 7 MMOL/L (ref 5–15)
APPEARANCE UR: CLEAR
AST SERPL-CCNC: 35 U/L (ref 15–37)
BACTERIA URNS QL MICRO: NEGATIVE /HPF
BASOPHILS # BLD: 0.1 K/UL (ref 0–0.1)
BASOPHILS NFR BLD: 1 % (ref 0–1)
BILIRUB SERPL-MCNC: 0.6 MG/DL (ref 0.2–1)
BILIRUB UR QL: NEGATIVE
BUN SERPL-MCNC: 17 MG/DL (ref 6–20)
BUN/CREAT SERPL: 20 (ref 12–20)
CALCIUM SERPL-MCNC: 9.3 MG/DL (ref 8.5–10.1)
CHLORIDE SERPL-SCNC: 102 MMOL/L (ref 97–108)
CHOLEST SERPL-MCNC: 229 MG/DL
CK SERPL-CCNC: 163 U/L (ref 26–192)
CO2 SERPL-SCNC: 27 MMOL/L (ref 21–32)
COLOR UR: NORMAL
CREAT SERPL-MCNC: 0.84 MG/DL (ref 0.55–1.02)
DIFFERENTIAL METHOD BLD: NORMAL
EOSINOPHIL # BLD: 0.3 K/UL (ref 0–0.4)
EOSINOPHIL NFR BLD: 4 % (ref 0–7)
EPITH CASTS URNS QL MICRO: NORMAL /LPF
ERYTHROCYTE [DISTWIDTH] IN BLOOD BY AUTOMATED COUNT: 13.8 % (ref 11.5–14.5)
GLOBULIN SER CALC-MCNC: 3.4 G/DL (ref 2–4)
GLUCOSE SERPL-MCNC: 105 MG/DL (ref 65–100)
GLUCOSE UR STRIP.AUTO-MCNC: NEGATIVE MG/DL
HCT VFR BLD AUTO: 41.2 % (ref 35–47)
HDLC SERPL-MCNC: 109 MG/DL
HDLC SERPL: 2.1 (ref 0–5)
HGB BLD-MCNC: 13.8 G/DL (ref 11.5–16)
HGB UR QL STRIP: NEGATIVE
HYALINE CASTS URNS QL MICRO: NORMAL /LPF (ref 0–5)
IMM GRANULOCYTES # BLD AUTO: 0 K/UL (ref 0–0.04)
IMM GRANULOCYTES NFR BLD AUTO: 0 % (ref 0–0.5)
KETONES UR QL STRIP.AUTO: NEGATIVE MG/DL
LDLC SERPL CALC-MCNC: 103.2 MG/DL (ref 0–100)
LEUKOCYTE ESTERASE UR QL STRIP.AUTO: NEGATIVE
LYMPHOCYTES # BLD: 2.7 K/UL (ref 0.8–3.5)
LYMPHOCYTES NFR BLD: 44 % (ref 12–49)
MCH RBC QN AUTO: 31.9 PG (ref 26–34)
MCHC RBC AUTO-ENTMCNC: 33.5 G/DL (ref 30–36.5)
MCV RBC AUTO: 95.2 FL (ref 80–99)
MONOCYTES # BLD: 0.6 K/UL (ref 0–1)
MONOCYTES NFR BLD: 9 % (ref 5–13)
NEUTS SEG # BLD: 2.7 K/UL (ref 1.8–8)
NEUTS SEG NFR BLD: 42 % (ref 32–75)
NITRITE UR QL STRIP.AUTO: NEGATIVE
NRBC # BLD: 0 K/UL (ref 0–0.01)
NRBC BLD-RTO: 0 PER 100 WBC
PLATELET # BLD AUTO: 269 K/UL (ref 150–400)
PMV BLD AUTO: 10.3 FL (ref 8.9–12.9)
POTASSIUM SERPL-SCNC: 4 MMOL/L (ref 3.5–5.1)
PROT SERPL-MCNC: 8.2 G/DL (ref 6.4–8.2)
PROT UR STRIP-MCNC: NEGATIVE MG/DL
RBC # BLD AUTO: 4.33 M/UL (ref 3.8–5.2)
RBC #/AREA URNS HPF: NORMAL /HPF (ref 0–5)
SODIUM SERPL-SCNC: 136 MMOL/L (ref 136–145)
SP GR UR REFRACTOMETRY: 1.01 (ref 1–1.03)
T4 FREE SERPL-MCNC: 1.1 NG/DL (ref 0.8–1.5)
TRIGL SERPL-MCNC: 84 MG/DL
TSH SERPL DL<=0.05 MIU/L-ACNC: 2.65 UIU/ML (ref 0.36–3.74)
UROBILINOGEN UR QL STRIP.AUTO: 0.2 EU/DL (ref 0.2–1)
VLDLC SERPL CALC-MCNC: 16.8 MG/DL
WBC # BLD AUTO: 6.3 K/UL (ref 3.6–11)
WBC URNS QL MICRO: NORMAL /HPF (ref 0–4)

## 2024-07-11 PROBLEM — Z00.00 MEDICARE ANNUAL WELLNESS VISIT, SUBSEQUENT: Status: RESOLVED | Noted: 2023-08-13 | Resolved: 2024-07-11

## 2024-08-07 ENCOUNTER — OFFICE VISIT (OUTPATIENT)
Facility: CLINIC | Age: 69
End: 2024-08-07
Payer: MEDICARE

## 2024-08-07 VITALS
BODY MASS INDEX: 24.98 KG/M2 | WEIGHT: 115.8 LBS | OXYGEN SATURATION: 95 % | HEART RATE: 72 BPM | SYSTOLIC BLOOD PRESSURE: 119 MMHG | TEMPERATURE: 98.8 F | DIASTOLIC BLOOD PRESSURE: 77 MMHG | HEIGHT: 57 IN | RESPIRATION RATE: 16 BRPM

## 2024-08-07 DIAGNOSIS — M81.0 AGE-RELATED OSTEOPOROSIS WITHOUT CURRENT PATHOLOGICAL FRACTURE: ICD-10-CM

## 2024-08-07 DIAGNOSIS — M54.50 MIDLINE LOW BACK PAIN WITHOUT SCIATICA, UNSPECIFIED CHRONICITY: Primary | ICD-10-CM

## 2024-08-07 DIAGNOSIS — M41.26 OTHER IDIOPATHIC SCOLIOSIS, LUMBAR REGION: ICD-10-CM

## 2024-08-07 PROCEDURE — 3017F COLORECTAL CA SCREEN DOC REV: CPT | Performed by: INTERNAL MEDICINE

## 2024-08-07 PROCEDURE — 99213 OFFICE O/P EST LOW 20 MIN: CPT | Performed by: INTERNAL MEDICINE

## 2024-08-07 PROCEDURE — 1036F TOBACCO NON-USER: CPT | Performed by: INTERNAL MEDICINE

## 2024-08-07 PROCEDURE — 1090F PRES/ABSN URINE INCON ASSESS: CPT | Performed by: INTERNAL MEDICINE

## 2024-08-07 PROCEDURE — G8399 PT W/DXA RESULTS DOCUMENT: HCPCS | Performed by: INTERNAL MEDICINE

## 2024-08-07 PROCEDURE — G8427 DOCREV CUR MEDS BY ELIG CLIN: HCPCS | Performed by: INTERNAL MEDICINE

## 2024-08-07 PROCEDURE — G8419 CALC BMI OUT NRM PARAM NOF/U: HCPCS | Performed by: INTERNAL MEDICINE

## 2024-08-07 PROCEDURE — 1123F ACP DISCUSS/DSCN MKR DOCD: CPT | Performed by: INTERNAL MEDICINE

## 2024-08-07 RX ORDER — MELOXICAM 15 MG/1
15 TABLET ORAL DAILY PRN
Qty: 30 TABLET | Refills: 0 | Status: SHIPPED | OUTPATIENT
Start: 2024-08-07

## 2024-08-07 RX ORDER — BACLOFEN 10 MG/1
10 TABLET ORAL 3 TIMES DAILY
Qty: 30 TABLET | Refills: 1 | Status: SHIPPED | OUTPATIENT
Start: 2024-08-07

## 2024-08-07 RX ORDER — ATORVASTATIN CALCIUM 20 MG/1
20 TABLET, FILM COATED ORAL DAILY
COMMUNITY

## 2024-08-07 RX ORDER — PREDNISONE 10 MG/1
TABLET ORAL
Qty: 21 EACH | Refills: 0 | Status: SHIPPED | OUTPATIENT
Start: 2024-08-07

## 2024-08-07 RX ORDER — CEFPODOXIME PROXETIL 200 MG/1
200 TABLET, FILM COATED ORAL 2 TIMES DAILY
COMMUNITY

## 2024-08-07 SDOH — ECONOMIC STABILITY: INCOME INSECURITY: HOW HARD IS IT FOR YOU TO PAY FOR THE VERY BASICS LIKE FOOD, HOUSING, MEDICAL CARE, AND HEATING?: NOT HARD AT ALL

## 2024-08-07 SDOH — ECONOMIC STABILITY: FOOD INSECURITY: WITHIN THE PAST 12 MONTHS, YOU WORRIED THAT YOUR FOOD WOULD RUN OUT BEFORE YOU GOT MONEY TO BUY MORE.: NEVER TRUE

## 2024-08-07 SDOH — ECONOMIC STABILITY: FOOD INSECURITY: WITHIN THE PAST 12 MONTHS, THE FOOD YOU BOUGHT JUST DIDN'T LAST AND YOU DIDN'T HAVE MONEY TO GET MORE.: NEVER TRUE

## 2024-08-07 NOTE — PROGRESS NOTES
Lena Elena is a 68 y.o. female     Chief Complaint   Patient presents with    Lower Back Pain       /77 (Site: Right Upper Arm, Position: Sitting, Cuff Size: Medium Adult)   Pulse 72   Temp 98.8 °F (37.1 °C) (Oral)   Resp 16   Ht 1.448 m (4' 9\")   Wt 52.5 kg (115 lb 12.8 oz)   SpO2 95%   BMI 25.06 kg/m²     Health Maintenance Due   Topic Date Due    DTaP/Tdap/Td vaccine (1 - Tdap) Never done    Diabetes screen  Never done    Shingles vaccine (1 of 2) Never done    Respiratory Syncytial Virus (RSV) Pregnant or age 60 yrs+ (1 - 1-dose 60+ series) Never done    COVID-19 Vaccine (4 - 2023-24 season) 09/01/2023    Flu vaccine (1) 08/01/2024         \"Have you been to the ER, urgent care clinic since your last visit?  Hospitalized since your last visit?\"    NO    “Have you seen or consulted any other health care providers outside of [facility] since your last visit?”    NO                   
clubbing or cyanosis  Skin-Warm and dry. no hyperpigmentation, vitiligo, or suspicious lesions  Neuro -alert, oriented, normal speech, no focal findings or movement disorder noted  Musculoskeletal-no obvious bony abnormality to palpation, mild paraspinal muscle tenderness    Assessment/Plan:  Lena was seen today for lower back pain.    Diagnoses and all orders for this visit:    Midline low back pain without sciatica, unspecified chronicity    Other idiopathic scoliosis, lumbar region    Age-related osteoporosis without current pathological fracture    Other orders  -     predniSONE 10 MG (21) TBPK; Take as directed.  -     baclofen (LIORESAL) 10 MG tablet; Take 1 tablet by mouth 3 times daily  -     meloxicam (MOBIC) 15 MG tablet; Take 1 tablet by mouth daily as needed for Pain          ICD-10-CM    1. Midline low back pain without sciatica, unspecified chronicity  M54.50       2. Other idiopathic scoliosis, lumbar region  M41.26       3. Age-related osteoporosis without current pathological fracture  M81.0         Plan:    I have reviewed her previous x-ray films done several months ago that shows some mild scoliosis and significant degenerative changes of the lumbar spine.  She had a bone density test done in April 2023 that shows significant osteoporosis.  She will be prescribed medication to see if this will afford her any relief.  If her symptoms persist consider physical therapy and/or referral to pain management for further evaluation and treatment.  She was given some back exercises that she can do on her own to see if this will be of any benefit as well.    Follow-up and Dispositions    Return if symptoms worsen or fail to improve.         I have reviewed with the patient details of the assessment and plan and all questions were answered. Relevent patient education was performed. Verbal and/or written instructions (see AVS) provided. The most recent lab findings were reviewed with the patient.  Plan was

## 2024-09-03 RX ORDER — LEVOTHYROXINE SODIUM 137 UG/1
TABLET ORAL
Qty: 90 TABLET | Refills: 3 | Status: SHIPPED | OUTPATIENT
Start: 2024-09-03

## 2024-09-03 RX ORDER — MELOXICAM 15 MG/1
15 TABLET ORAL DAILY PRN
Qty: 90 TABLET | Refills: 3 | Status: SHIPPED | OUTPATIENT
Start: 2024-09-03

## 2024-09-03 NOTE — TELEPHONE ENCOUNTER
RX refill request from the patient/pharmacy. Patient last seen 08- with labs, and next appt. scheduled for 10-  Requested Prescriptions     Pending Prescriptions Disp Refills    meloxicam (MOBIC) 15 MG tablet [Pharmacy Med Name: MELOXICAM 15 MG TABLET] 90 tablet 3     Sig: TAKE 1 TABLET BY MOUTH EVERY DAY AS NEEDED FOR PAIN    .

## 2024-09-03 NOTE — TELEPHONE ENCOUNTER
RX refill request from the patient/pharmacy. Patient last seen 08- with labs, and next appt. scheduled for 10-  Requested Prescriptions     Pending Prescriptions Disp Refills    levothyroxine (SYNTHROID) 137 MCG tablet [Pharmacy Med Name: LEVOTHYROXINE 137 MCG TABLET] 90 tablet 3     Sig: TAKE 1 TABLET DAILY BEFORE BREAKFAST (DOSE INCREASE PER LABS ON 7/29/22)    .

## 2024-10-23 NOTE — PROGRESS NOTES
Chief Complaint   Patient presents with    Depression    Cholesterol Problem     3 month f/u    Hypothyroidism       SUBJECTIVE:    Lena Elena is a 69 y.o. female who returns in follow-up for medical problems include hypertension, hyperlipidemia, depression, DJD, hypothyroidism and other medical problems.  She does feel that the depression is controlled now.  She denies any chest pain, shortness of breath or cardiorespiratory complaints.  There are no current GI or  complaints.  She has no headaches, dizziness or neurologic complaints.  She has no current active arthritic complaints and there are no other complaints on complete review of systems.      Current Outpatient Medications   Medication Sig Dispense Refill    atorvastatin (LIPITOR) 20 MG tablet Take 1 tablet by mouth daily 90 tablet 1    levothyroxine (SYNTHROID) 137 MCG tablet TAKE 1 TABLET DAILY BEFORE BREAKFAST (DOSE INCREASE PER LABS ON 7/29/22) 90 tablet 3    meloxicam (MOBIC) 15 MG tablet TAKE 1 TABLET BY MOUTH EVERY DAY AS NEEDED FOR PAIN 90 tablet 3    cefpodoxime (VANTIN) 200 MG tablet Take 1 tablet by mouth 2 times daily      sertraline (ZOLOFT) 100 MG tablet Take 2 tablets by mouth every day 180 tablet 2    Calcium 250 MG CAPS Take by mouth      B Complex Vitamins (VITAMIN B COMPLEX 100 IJ) vitamin B complex      acetaminophen (TYLENOL) 325 MG tablet Take 2 tablets by mouth      Cyanocobalamin (B-12 PO) Take 5,000 mcg by mouth daily      Multiple Vitamin (MULTIVITAMIN PO) Take by mouth daily       No current facility-administered medications for this visit.     Past Medical History:   Diagnosis Date    Adverse effect of anesthesia     Pt states her daughter had an Anaphylactic reaction to Anesthesia    Arthritis     Back pain     Hypertension     Single episode    Menopause     Neck pain     Psychiatric disorder     Depression, Anxiety    Thyroid disease      Past Surgical History:   Procedure Laterality Date    BACK SURGERY      c4-5

## 2024-10-24 ENCOUNTER — OFFICE VISIT (OUTPATIENT)
Facility: CLINIC | Age: 69
End: 2024-10-24

## 2024-10-24 VITALS
SYSTOLIC BLOOD PRESSURE: 128 MMHG | DIASTOLIC BLOOD PRESSURE: 80 MMHG | BODY MASS INDEX: 25.28 KG/M2 | WEIGHT: 117.2 LBS | RESPIRATION RATE: 17 BRPM | HEART RATE: 77 BPM | HEIGHT: 57 IN | TEMPERATURE: 98.3 F | OXYGEN SATURATION: 98 %

## 2024-10-24 DIAGNOSIS — F51.01 PRIMARY INSOMNIA: ICD-10-CM

## 2024-10-24 DIAGNOSIS — R74.8 ELEVATED LIVER ENZYMES: ICD-10-CM

## 2024-10-24 DIAGNOSIS — E78.2 MIXED HYPERLIPIDEMIA: Primary | ICD-10-CM

## 2024-10-24 DIAGNOSIS — N18.30 STAGE 3 CHRONIC KIDNEY DISEASE, UNSPECIFIED WHETHER STAGE 3A OR 3B CKD (HCC): ICD-10-CM

## 2024-10-24 DIAGNOSIS — M15.0 PRIMARY OSTEOARTHRITIS INVOLVING MULTIPLE JOINTS: ICD-10-CM

## 2024-10-24 LAB
ALBUMIN SERPL-MCNC: 4.3 G/DL (ref 3.5–5)
ALBUMIN/GLOB SERPL: 1.3 (ref 1.1–2.2)
ALP SERPL-CCNC: 69 U/L (ref 45–117)
ALT SERPL-CCNC: 30 U/L (ref 12–78)
ANION GAP SERPL CALC-SCNC: 5 MMOL/L (ref 2–12)
AST SERPL-CCNC: 28 U/L (ref 15–37)
BILIRUB SERPL-MCNC: 0.4 MG/DL (ref 0.2–1)
BUN SERPL-MCNC: 21 MG/DL (ref 6–20)
BUN/CREAT SERPL: 21 (ref 12–20)
CALCIUM SERPL-MCNC: 9.9 MG/DL (ref 8.5–10.1)
CHLORIDE SERPL-SCNC: 104 MMOL/L (ref 97–108)
CHOLEST SERPL-MCNC: 303 MG/DL
CK SERPL-CCNC: 168 U/L (ref 26–192)
CO2 SERPL-SCNC: 30 MMOL/L (ref 21–32)
CREAT SERPL-MCNC: 1 MG/DL (ref 0.55–1.02)
GLOBULIN SER CALC-MCNC: 3.4 G/DL (ref 2–4)
GLUCOSE SERPL-MCNC: 105 MG/DL (ref 65–100)
HDLC SERPL-MCNC: 118 MG/DL
HDLC SERPL: 2.6 (ref 0–5)
LDLC SERPL CALC-MCNC: 168.6 MG/DL (ref 0–100)
POTASSIUM SERPL-SCNC: 4 MMOL/L (ref 3.5–5.1)
PROT SERPL-MCNC: 7.7 G/DL (ref 6.4–8.2)
SODIUM SERPL-SCNC: 139 MMOL/L (ref 136–145)
TRIGL SERPL-MCNC: 82 MG/DL
VLDLC SERPL CALC-MCNC: 16.4 MG/DL

## 2024-10-24 RX ORDER — ATORVASTATIN CALCIUM 20 MG/1
20 TABLET, FILM COATED ORAL DAILY
Qty: 90 TABLET | Refills: 1 | Status: SHIPPED | OUTPATIENT
Start: 2024-10-24

## 2024-10-24 NOTE — PROGRESS NOTES
Lena Elena is a 69 y.o. female     Chief Complaint   Patient presents with    Depression    Cholesterol Problem     3 month f/u    Hypothyroidism       /60 (Site: Left Upper Arm, Position: Sitting, Cuff Size: Large Adult)   Pulse 77   Temp 98.3 °F (36.8 °C) (Oral)   Resp 17   Ht 1.448 m (4' 9\")   Wt 53.2 kg (117 lb 3.2 oz)   SpO2 98%   BMI 25.36 kg/m²     Health Maintenance Due   Topic Date Due    DTaP/Tdap/Td vaccine (1 - Tdap) Never done    Diabetes screen  Never done    Shingles vaccine (1 of 2) Never done    Respiratory Syncytial Virus (RSV) Pregnant or age 60 yrs+ (1 - 1-dose 60+ series) Never done    Flu vaccine (1) 08/01/2024    COVID-19 Vaccine (4 - 2023-24 season) 09/01/2024         \"Have you been to the ER, urgent care clinic since your last visit?  Hospitalized since your last visit?\"    NO    “Have you seen or consulted any other health care providers outside of [facility] since your last visit?”    NO

## 2025-02-06 ENCOUNTER — OFFICE VISIT (OUTPATIENT)
Facility: CLINIC | Age: 70
End: 2025-02-06

## 2025-02-06 ENCOUNTER — HOSPITAL ENCOUNTER (OUTPATIENT)
Facility: HOSPITAL | Age: 70
Discharge: HOME | End: 2025-02-09
Payer: MEDICARE

## 2025-02-06 VITALS
OXYGEN SATURATION: 97 % | SYSTOLIC BLOOD PRESSURE: 110 MMHG | TEMPERATURE: 98.7 F | HEART RATE: 79 BPM | DIASTOLIC BLOOD PRESSURE: 72 MMHG | WEIGHT: 116.6 LBS | BODY MASS INDEX: 25.23 KG/M2

## 2025-02-06 DIAGNOSIS — F51.01 PRIMARY INSOMNIA: ICD-10-CM

## 2025-02-06 DIAGNOSIS — E03.9 ACQUIRED HYPOTHYROIDISM: ICD-10-CM

## 2025-02-06 DIAGNOSIS — Z98.1 S/P CERVICAL SPINAL FUSION: ICD-10-CM

## 2025-02-06 DIAGNOSIS — Z00.00 MEDICARE ANNUAL WELLNESS VISIT, SUBSEQUENT: ICD-10-CM

## 2025-02-06 DIAGNOSIS — M50.30 DEGENERATIVE DISC DISEASE, CERVICAL: ICD-10-CM

## 2025-02-06 DIAGNOSIS — M15.0 PRIMARY OSTEOARTHRITIS INVOLVING MULTIPLE JOINTS: ICD-10-CM

## 2025-02-06 DIAGNOSIS — M81.0 AGE-RELATED OSTEOPOROSIS WITHOUT CURRENT PATHOLOGICAL FRACTURE: ICD-10-CM

## 2025-02-06 DIAGNOSIS — R05.1 ACUTE COUGH: ICD-10-CM

## 2025-02-06 DIAGNOSIS — N18.30 STAGE 3 CHRONIC KIDNEY DISEASE, UNSPECIFIED WHETHER STAGE 3A OR 3B CKD (HCC): ICD-10-CM

## 2025-02-06 DIAGNOSIS — Z13.39 ALCOHOL SCREENING: ICD-10-CM

## 2025-02-06 DIAGNOSIS — J20.9 ACUTE BRONCHITIS, UNSPECIFIED ORGANISM: ICD-10-CM

## 2025-02-06 DIAGNOSIS — R74.8 ELEVATED LIVER ENZYMES: ICD-10-CM

## 2025-02-06 DIAGNOSIS — F32.A DEPRESSION, UNSPECIFIED DEPRESSION TYPE: ICD-10-CM

## 2025-02-06 DIAGNOSIS — E55.9 VITAMIN D DEFICIENCY: ICD-10-CM

## 2025-02-06 DIAGNOSIS — E78.2 MIXED HYPERLIPIDEMIA: Primary | ICD-10-CM

## 2025-02-06 PROCEDURE — 71046 X-RAY EXAM CHEST 2 VIEWS: CPT

## 2025-02-06 RX ORDER — CODEINE PHOSPHATE AND GUAIFENESIN 10; 100 MG/5ML; MG/5ML
5 SOLUTION ORAL 3 TIMES DAILY PRN
Qty: 150 ML | Refills: 0 | Status: SHIPPED | OUTPATIENT
Start: 2025-02-06 | End: 2025-02-16

## 2025-02-06 RX ORDER — CEFUROXIME AXETIL 500 MG/1
500 TABLET ORAL 2 TIMES DAILY
Qty: 20 TABLET | Refills: 0 | Status: SHIPPED | OUTPATIENT
Start: 2025-02-06 | End: 2025-02-16

## 2025-02-06 ASSESSMENT — PATIENT HEALTH QUESTIONNAIRE - PHQ9
9. THOUGHTS THAT YOU WOULD BE BETTER OFF DEAD, OR OF HURTING YOURSELF: NOT AT ALL
8. MOVING OR SPEAKING SO SLOWLY THAT OTHER PEOPLE COULD HAVE NOTICED. OR THE OPPOSITE, BEING SO FIGETY OR RESTLESS THAT YOU HAVE BEEN MOVING AROUND A LOT MORE THAN USUAL: NOT AT ALL
1. LITTLE INTEREST OR PLEASURE IN DOING THINGS: NOT AT ALL
4. FEELING TIRED OR HAVING LITTLE ENERGY: SEVERAL DAYS
3. TROUBLE FALLING OR STAYING ASLEEP: SEVERAL DAYS
6. FEELING BAD ABOUT YOURSELF - OR THAT YOU ARE A FAILURE OR HAVE LET YOURSELF OR YOUR FAMILY DOWN: NOT AT ALL
10. IF YOU CHECKED OFF ANY PROBLEMS, HOW DIFFICULT HAVE THESE PROBLEMS MADE IT FOR YOU TO DO YOUR WORK, TAKE CARE OF THINGS AT HOME, OR GET ALONG WITH OTHER PEOPLE: SOMEWHAT DIFFICULT
5. POOR APPETITE OR OVEREATING: NOT AT ALL
7. TROUBLE CONCENTRATING ON THINGS, SUCH AS READING THE NEWSPAPER OR WATCHING TELEVISION: MORE THAN HALF THE DAYS
SUM OF ALL RESPONSES TO PHQ QUESTIONS 1-9: 7
SUM OF ALL RESPONSES TO PHQ QUESTIONS 1-9: 7
2. FEELING DOWN, DEPRESSED OR HOPELESS: NEARLY EVERY DAY
SUM OF ALL RESPONSES TO PHQ QUESTIONS 1-9: 7
SUM OF ALL RESPONSES TO PHQ9 QUESTIONS 1 & 2: 3
SUM OF ALL RESPONSES TO PHQ QUESTIONS 1-9: 7

## 2025-02-06 ASSESSMENT — LIFESTYLE VARIABLES
HOW MANY STANDARD DRINKS CONTAINING ALCOHOL DO YOU HAVE ON A TYPICAL DAY: 1 OR 2
HOW OFTEN DO YOU HAVE A DRINK CONTAINING ALCOHOL: 2-3 TIMES A WEEK

## 2025-02-06 NOTE — PROGRESS NOTES
This is a Subsequent Medicare Annual Wellness Visit providing Personalized Prevention Plan Services (PPPS) (Performed 12 months after initial AWV and PPPS )    I have reviewed the patient's medical history in detail and updated the computerized patient record.  She presents today for Medicare subsequent annual wellness examination and screening questionnaire.    She is also here in follow-up of her medical problems include hyperlipidemia, hypothyroidism, DJD with cervical radiculopathy status post surgery, osteoporosis, vitamin D deficiency, CKD stage III, history of anxiety with depression and other multiple medical problems.  Since she last saw me she unfortunately lost her  suddenly on November 20 when he apparently had a cardiac event resulting in an auto accident and his passing away.  She has been dealing with that and does not feel like she needs anything additional for the depression.  She now has a more acute problem over the past 6 days of a lot of congestion that seems to be head and chest congestion with a lot of cough.  She does not get much sputum but she feels like there is a lot of congestion in her chest.  She notes no shortness of breath.  She notes no fevers or chills.  She does note that the cough is particularly bad at night when she lays down to try to sleep.  She denies any other cardiorespiratory complaints including no chest pain, palpitations, PND, orthopnea or other cardiac or respiratory complaints.  She notes no current GI or  complaints.  She notes no headaches, dizziness or neurologic complaints.  She notes no current new arthritic complaints but does have a chronic joint aches and pains that is unchanged.  There are no other complaints on complete review of systems.    History     Past Medical History:   Diagnosis Date    Adverse effect of anesthesia     Pt states her daughter had an Anaphylactic reaction to Anesthesia    Arthritis     Back pain     Hypertension     Single

## 2025-02-06 NOTE — PATIENT INSTRUCTIONS
manage stress?  You can try these things to help manage stress:   Do something active. Exercise or activity can help reduce stress. Walking is a great way to get started. Even everyday activities such as housecleaning or yard work can help.  Try yoga or franky chi. These techniques combine exercise and meditation. You may need some training at first to learn them.  Do something you enjoy. For example, listen to music or go to a movie. Practice your hobby or do volunteer work.  Meditate. This can help you relax, because you are not worrying about what happened before or what may happen in the future.  Do guided imagery. Imagine yourself in any setting that helps you feel calm. You can use online videos, books, or a teacher to guide you.  Do breathing exercises. For example:  From a standing position, bend forward from the waist with your knees slightly bent. Let your arms dangle close to the floor.  Breathe in slowly and deeply as you return to a standing position. Roll up slowly and lift your head last.  Hold your breath for just a few seconds in the standing position.  Breathe out slowly and bend forward from the waist.  Let your feelings out. Talk, laugh, cry, and express anger when you need to. Talking with supportive friends or family, a counselor, or a bharath leader about your feelings is a healthy way to relieve stress. Avoid discussing your feelings with people who make you feel worse.  Write. It may help to write about things that are bothering you. This helps you find out how much stress you feel and what is causing it. When you know this, you can find better ways to cope.  What can you do to prevent stress?  You might try some of these things to help prevent stress:  Manage your time. This helps you find time to do the things you want and need to do.  Get enough sleep. Your body recovers from the stresses of the day while you are sleeping.  Get support. Your family, friends, and community can make a difference

## 2025-02-07 LAB
25(OH)D3 SERPL-MCNC: 33.6 NG/ML (ref 30–100)
ALBUMIN SERPL-MCNC: 4.3 G/DL (ref 3.5–5)
ALBUMIN/GLOB SERPL: 1.2 (ref 1.1–2.2)
ALP SERPL-CCNC: 69 U/L (ref 45–117)
ALT SERPL-CCNC: 26 U/L (ref 12–78)
ANION GAP SERPL CALC-SCNC: 8 MMOL/L (ref 2–12)
APPEARANCE UR: CLEAR
AST SERPL-CCNC: 32 U/L (ref 15–37)
BACTERIA URNS QL MICRO: NEGATIVE /HPF
BASOPHILS # BLD: 0.07 K/UL (ref 0–0.1)
BASOPHILS NFR BLD: 1 % (ref 0–1)
BILIRUB SERPL-MCNC: 0.3 MG/DL (ref 0.2–1)
BILIRUB UR QL: NEGATIVE
BUN SERPL-MCNC: 14 MG/DL (ref 6–20)
BUN/CREAT SERPL: 15 (ref 12–20)
CALCIUM SERPL-MCNC: 9.7 MG/DL (ref 8.5–10.1)
CHLORIDE SERPL-SCNC: 99 MMOL/L (ref 97–108)
CHOLEST SERPL-MCNC: 245 MG/DL
CK SERPL-CCNC: 228 U/L (ref 26–192)
CO2 SERPL-SCNC: 27 MMOL/L (ref 21–32)
COLOR UR: ABNORMAL
CREAT SERPL-MCNC: 0.95 MG/DL (ref 0.55–1.02)
DIFFERENTIAL METHOD BLD: NORMAL
EOSINOPHIL # BLD: 0.18 K/UL (ref 0–0.4)
EOSINOPHIL NFR BLD: 2.5 % (ref 0–7)
EPITH CASTS URNS QL MICRO: ABNORMAL /LPF
ERYTHROCYTE [DISTWIDTH] IN BLOOD BY AUTOMATED COUNT: 13.8 % (ref 11.5–14.5)
GLOBULIN SER CALC-MCNC: 3.6 G/DL (ref 2–4)
GLUCOSE SERPL-MCNC: 109 MG/DL (ref 65–100)
GLUCOSE UR STRIP.AUTO-MCNC: NEGATIVE MG/DL
HCT VFR BLD AUTO: 40.3 % (ref 35–47)
HDLC SERPL-MCNC: 110 MG/DL
HDLC SERPL: 2.2 (ref 0–5)
HGB BLD-MCNC: 13.3 G/DL (ref 11.5–16)
HGB UR QL STRIP: NEGATIVE
HYALINE CASTS URNS QL MICRO: ABNORMAL /LPF (ref 0–5)
IMM GRANULOCYTES # BLD AUTO: 0.02 K/UL (ref 0–0.04)
IMM GRANULOCYTES NFR BLD AUTO: 0.3 % (ref 0–0.5)
KETONES UR QL STRIP.AUTO: NEGATIVE MG/DL
LDLC SERPL CALC-MCNC: 104.6 MG/DL (ref 0–100)
LEUKOCYTE ESTERASE UR QL STRIP.AUTO: ABNORMAL
LYMPHOCYTES # BLD: 1.8 K/UL (ref 0.8–3.5)
LYMPHOCYTES NFR BLD: 25.1 % (ref 12–49)
MCH RBC QN AUTO: 31 PG (ref 26–34)
MCHC RBC AUTO-ENTMCNC: 33 G/DL (ref 30–36.5)
MCV RBC AUTO: 93.9 FL (ref 80–99)
MONOCYTES # BLD: 0.54 K/UL (ref 0–1)
MONOCYTES NFR BLD: 7.5 % (ref 5–13)
NEUTS SEG # BLD: 4.56 K/UL (ref 1.8–8)
NEUTS SEG NFR BLD: 63.6 % (ref 32–75)
NITRITE UR QL STRIP.AUTO: NEGATIVE
NRBC # BLD: 0 K/UL (ref 0–0.01)
NRBC BLD-RTO: 0 PER 100 WBC
PH UR STRIP: 7 (ref 5–8)
PLATELET # BLD AUTO: 252 K/UL (ref 150–400)
PMV BLD AUTO: 10.3 FL (ref 8.9–12.9)
POTASSIUM SERPL-SCNC: 3.7 MMOL/L (ref 3.5–5.1)
PROT SERPL-MCNC: 7.9 G/DL (ref 6.4–8.2)
PROT UR STRIP-MCNC: NEGATIVE MG/DL
RBC # BLD AUTO: 4.29 M/UL (ref 3.8–5.2)
RBC #/AREA URNS HPF: ABNORMAL /HPF (ref 0–5)
SODIUM SERPL-SCNC: 134 MMOL/L (ref 136–145)
SP GR UR REFRACTOMETRY: 1.01 (ref 1–1.03)
T4 FREE SERPL-MCNC: 0.9 NG/DL (ref 0.8–1.5)
TRIGL SERPL-MCNC: 152 MG/DL
TSH SERPL DL<=0.05 MIU/L-ACNC: 12.4 UIU/ML (ref 0.36–3.74)
UROBILINOGEN UR QL STRIP.AUTO: 0.2 EU/DL (ref 0.2–1)
VLDLC SERPL CALC-MCNC: 30.4 MG/DL
WBC # BLD AUTO: 7.2 K/UL (ref 3.6–11)
WBC URNS QL MICRO: ABNORMAL /HPF (ref 0–4)

## 2025-02-21 ENCOUNTER — TELEPHONE (OUTPATIENT)
Facility: CLINIC | Age: 70
End: 2025-02-21

## 2025-02-21 RX ORDER — LEVOTHYROXINE SODIUM 150 UG/1
150 TABLET ORAL DAILY
Qty: 90 TABLET | Refills: 1 | Status: SHIPPED | OUTPATIENT
Start: 2025-02-21

## 2025-02-21 NOTE — TELEPHONE ENCOUNTER
Pt is still waiting on the increase of    levothyroxine (SYNTHROID) 137 MCG tablet  To be sent to her pharmacy

## 2025-03-07 PROBLEM — Z00.00 MEDICARE ANNUAL WELLNESS VISIT, SUBSEQUENT: Status: RESOLVED | Noted: 2023-08-13 | Resolved: 2025-03-07

## 2025-03-11 PROBLEM — R05.9 COUGH: Status: ACTIVE | Noted: 2025-03-11

## 2025-03-11 NOTE — PROGRESS NOTES
Chief Complaint   Patient presents with    Cough    Lower Back Pain     Pain level 5/when sitting 1       SUBJECTIVE:    Lena Elena is a 69 y.o. female she presents today for evaluation of cough that has been persistent since she was seen by me for bronchial infection on February 6 at which time I did a chest x-ray which was negative and thus it felt that it was consistent with a severe bronchitis.  I treated her with Ceftin for 10 days as well as given a Robitussin with codeine.  She did improve but is persisting now with a cough.  She notes occasionally having a little yellow tinge to the sputum but most of the time is clear.  She notes no shortness of breath.  There is no associated fevers or chills.  In addition to that she actually has another problem which is related to the low back pain that she is developed over the last few days but she has been doing some yard work and does not remember whether she may have done something the injury to maybe she just overdid it.  There is no numbness or weakness in the legs and no radiation of the pain in the legs.  She has no bowel or bladder dysfunction.  She has had no falls.  The remainder of complete review of systems is negative.      Current Outpatient Medications   Medication Sig Dispense Refill    predniSONE 5 MG (48) TBPK Take prednisone 5 mg 12-day Dosepak as directed 1 each 0    guaiFENesin-codeine (GUAIFENESIN AC) 100-10 MG/5ML liquid Take 5 mLs by mouth 3 times daily as needed for Cough for up to 10 days. Max Daily Amount: 15 mLs 150 mL 0    levothyroxine (SYNTHROID) 150 MCG tablet Take 1 tablet by mouth daily 90 tablet 1    atorvastatin (LIPITOR) 20 MG tablet Take 1 tablet by mouth daily 90 tablet 1    sertraline (ZOLOFT) 100 MG tablet Take 2 tablets by mouth every day 180 tablet 2    Calcium 250 MG CAPS Take by mouth      B Complex Vitamins (VITAMIN B COMPLEX 100 IJ) vitamin B complex      acetaminophen (TYLENOL) 325 MG tablet Take 2 tablets by

## 2025-03-12 ENCOUNTER — OFFICE VISIT (OUTPATIENT)
Facility: CLINIC | Age: 70
End: 2025-03-12
Payer: MEDICARE

## 2025-03-12 VITALS
TEMPERATURE: 97.9 F | SYSTOLIC BLOOD PRESSURE: 120 MMHG | BODY MASS INDEX: 25.07 KG/M2 | HEIGHT: 57 IN | HEART RATE: 83 BPM | RESPIRATION RATE: 14 BRPM | DIASTOLIC BLOOD PRESSURE: 77 MMHG | OXYGEN SATURATION: 95 % | WEIGHT: 116.2 LBS

## 2025-03-12 DIAGNOSIS — M54.50 ACUTE MIDLINE LOW BACK PAIN WITHOUT SCIATICA: ICD-10-CM

## 2025-03-12 DIAGNOSIS — R05.9 COUGH, UNSPECIFIED TYPE: Primary | ICD-10-CM

## 2025-03-12 PROCEDURE — 1159F MED LIST DOCD IN RCRD: CPT | Performed by: INTERNAL MEDICINE

## 2025-03-12 PROCEDURE — G8427 DOCREV CUR MEDS BY ELIG CLIN: HCPCS | Performed by: INTERNAL MEDICINE

## 2025-03-12 PROCEDURE — G8399 PT W/DXA RESULTS DOCUMENT: HCPCS | Performed by: INTERNAL MEDICINE

## 2025-03-12 PROCEDURE — 3017F COLORECTAL CA SCREEN DOC REV: CPT | Performed by: INTERNAL MEDICINE

## 2025-03-12 PROCEDURE — 1123F ACP DISCUSS/DSCN MKR DOCD: CPT | Performed by: INTERNAL MEDICINE

## 2025-03-12 PROCEDURE — G8419 CALC BMI OUT NRM PARAM NOF/U: HCPCS | Performed by: INTERNAL MEDICINE

## 2025-03-12 PROCEDURE — 1036F TOBACCO NON-USER: CPT | Performed by: INTERNAL MEDICINE

## 2025-03-12 PROCEDURE — 99214 OFFICE O/P EST MOD 30 MIN: CPT | Performed by: INTERNAL MEDICINE

## 2025-03-12 PROCEDURE — 1090F PRES/ABSN URINE INCON ASSESS: CPT | Performed by: INTERNAL MEDICINE

## 2025-03-12 PROCEDURE — 1125F AMNT PAIN NOTED PAIN PRSNT: CPT | Performed by: INTERNAL MEDICINE

## 2025-03-12 RX ORDER — PREDNISONE 5 MG/1
TABLET ORAL
Qty: 1 EACH | Refills: 0 | Status: SHIPPED | OUTPATIENT
Start: 2025-03-12

## 2025-03-12 RX ORDER — CODEINE PHOSPHATE AND GUAIFENESIN 10; 100 MG/5ML; MG/5ML
5 SOLUTION ORAL 3 TIMES DAILY PRN
Qty: 150 ML | Refills: 0 | Status: SHIPPED | OUTPATIENT
Start: 2025-03-12 | End: 2025-03-22

## 2025-03-12 NOTE — PROGRESS NOTES
Lena Elena is a 69 y.o. female    Chief Complaint   Patient presents with    Cough    Lower Back Pain     Pain level 5/when sitting 1     Vitals:    03/12/25 1327   BP: 120/77   BP Site: Left Upper Arm   Patient Position: Sitting   Pulse: 83   Resp: 14   Temp: 97.9 °F (36.6 °C)   SpO2: 95%   Weight: 52.7 kg (116 lb 3.2 oz)   Height: 1.448 m (4' 9\")         Health Maintenance Due   Topic Date Due    DTaP/Tdap/Td vaccine (1 - Tdap) Never done    Diabetes screen  Never done    Shingles vaccine (1 of 2) Never done    Flu vaccine (1) 08/01/2024    COVID-19 Vaccine (4 - 2024-25 season) 09/01/2024         \"Have you been to the ER, urgent care clinic since your last visit?  Hospitalized since your last visit?\"    NO    “Have you seen or consulted any other health care providers outside of [facility] since your last visit?”    NO

## 2025-03-20 RX ORDER — SERTRALINE HYDROCHLORIDE 100 MG/1
100 TABLET, FILM COATED ORAL DAILY
Qty: 90 TABLET | Refills: 3 | Status: SHIPPED | OUTPATIENT
Start: 2025-03-20

## 2025-03-20 RX ORDER — ATORVASTATIN CALCIUM 20 MG/1
20 TABLET, FILM COATED ORAL DAILY
Qty: 90 TABLET | Refills: 1 | Status: SHIPPED | OUTPATIENT
Start: 2025-03-20

## 2025-03-20 NOTE — TELEPHONE ENCOUNTER
PCP: Jordon Quispe MD    Last appt: 3/12/2025    Future Appointments   Date Time Provider Department Center   4/7/2025  2:30 PM [facility]   5/9/2025  9:10 AM Jordon Quispe MD [facility]       Requested Prescriptions     Pending Prescriptions Disp Refills    sertraline (ZOLOFT) 100 MG tablet [Pharmacy Med Name: SERTRALINE  MG TABLET] 90 tablet 3     Sig: TAKE 1 TABLET BY MOUTH EVERY DAY    atorvastatin (LIPITOR) 20 MG tablet [Pharmacy Med Name: ATORVASTATIN 20 MG TABLET] 90 tablet 1     Sig: TAKE 1 TABLET BY MOUTH EVERY DAY

## 2025-03-24 ENCOUNTER — TELEPHONE (OUTPATIENT)
Facility: CLINIC | Age: 70
End: 2025-03-24

## 2025-03-24 RX ORDER — SERTRALINE HYDROCHLORIDE 100 MG/1
200 TABLET, FILM COATED ORAL DAILY
Qty: 180 TABLET | Refills: 1 | Status: SHIPPED | OUTPATIENT
Start: 2025-03-24

## 2025-03-24 NOTE — TELEPHONE ENCOUNTER
Patient called in requesting a refill of her prescription called into [facility] for her Sertraline. Advised patient that the prescription was called in on 3/20/25 and that it states it was received electronically. Patient advised she has spoken to them and that they advise they do not have the prescription.    Reached out to pharmacy to verify if the prescription was received or needed to be resent. Pharmacist advised that the prescription was received however, the patient advises she is supposed to be taking 2 pills per day.     Returned call to the patient to clarify the dose of her medication as this was not discussed in the first conversation. Patient confirmed she takes 2 per day and is unsure why the dose has been changed. Requesting the prescription to be sent to the pharmacy for the two pills per day.     Please advise as patient has been out of the medication for a week.

## 2025-03-25 NOTE — TELEPHONE ENCOUNTER
Called patient and advised that the correct prescription Sertraline 2 tabs once a day has been sent to the [facility] pharmacy by  yesterday.

## 2025-04-07 ENCOUNTER — HOSPITAL ENCOUNTER (OUTPATIENT)
Facility: HOSPITAL | Age: 70
Discharge: HOME | End: 2025-04-10
Attending: INTERNAL MEDICINE
Payer: MEDICARE

## 2025-04-07 DIAGNOSIS — Z12.31 ENCOUNTER FOR SCREENING MAMMOGRAM FOR MALIGNANT NEOPLASM OF BREAST: ICD-10-CM

## 2025-04-07 PROCEDURE — 77063 BREAST TOMOSYNTHESIS BI: CPT

## 2025-04-10 PROBLEM — R05.9 COUGH: Status: RESOLVED | Noted: 2025-03-11 | Resolved: 2025-04-10

## 2025-06-09 ENCOUNTER — TRANSCRIBE ORDERS (OUTPATIENT)
Facility: HOSPITAL | Age: 70
End: 2025-06-09

## 2025-06-09 DIAGNOSIS — M25.462 EFFUSION OF LEFT KNEE: Primary | ICD-10-CM

## 2025-06-11 ENCOUNTER — OFFICE VISIT (OUTPATIENT)
Facility: CLINIC | Age: 70
End: 2025-06-11
Payer: MEDICARE

## 2025-06-11 VITALS
BODY MASS INDEX: 24.89 KG/M2 | OXYGEN SATURATION: 95 % | WEIGHT: 115 LBS | TEMPERATURE: 98.6 F | DIASTOLIC BLOOD PRESSURE: 67 MMHG | SYSTOLIC BLOOD PRESSURE: 108 MMHG | HEART RATE: 76 BPM

## 2025-06-11 DIAGNOSIS — N18.30 STAGE 3 CHRONIC KIDNEY DISEASE, UNSPECIFIED WHETHER STAGE 3A OR 3B CKD (HCC): ICD-10-CM

## 2025-06-11 DIAGNOSIS — F32.A DEPRESSION, UNSPECIFIED DEPRESSION TYPE: ICD-10-CM

## 2025-06-11 DIAGNOSIS — R74.8 ELEVATED LIVER ENZYMES: ICD-10-CM

## 2025-06-11 DIAGNOSIS — M15.0 PRIMARY OSTEOARTHRITIS INVOLVING MULTIPLE JOINTS: ICD-10-CM

## 2025-06-11 DIAGNOSIS — E78.2 MIXED HYPERLIPIDEMIA: Primary | ICD-10-CM

## 2025-06-11 LAB
ALBUMIN SERPL-MCNC: 4.4 G/DL (ref 3.5–5)
ALBUMIN/GLOB SERPL: 1.5 (ref 1.1–2.2)
ALP SERPL-CCNC: 76 U/L (ref 45–117)
ALT SERPL-CCNC: 32 U/L (ref 12–78)
ANION GAP SERPL CALC-SCNC: 7 MMOL/L (ref 2–12)
AST SERPL-CCNC: 34 U/L (ref 15–37)
BILIRUB SERPL-MCNC: 0.4 MG/DL (ref 0.2–1)
BUN SERPL-MCNC: 16 MG/DL (ref 6–20)
BUN/CREAT SERPL: 20 (ref 12–20)
CALCIUM SERPL-MCNC: 9.4 MG/DL (ref 8.5–10.1)
CHLORIDE SERPL-SCNC: 105 MMOL/L (ref 97–108)
CHOLEST SERPL-MCNC: 210 MG/DL
CK SERPL-CCNC: 151 U/L (ref 26–192)
CO2 SERPL-SCNC: 26 MMOL/L (ref 21–32)
CREAT SERPL-MCNC: 0.82 MG/DL (ref 0.55–1.02)
GLOBULIN SER CALC-MCNC: 3 G/DL (ref 2–4)
GLUCOSE SERPL-MCNC: 105 MG/DL (ref 65–100)
HDLC SERPL-MCNC: 121 MG/DL
HDLC SERPL: 1.7 (ref 0–5)
LDLC SERPL CALC-MCNC: 75.8 MG/DL (ref 0–100)
POTASSIUM SERPL-SCNC: 4.1 MMOL/L (ref 3.5–5.1)
PROT SERPL-MCNC: 7.4 G/DL (ref 6.4–8.2)
SODIUM SERPL-SCNC: 138 MMOL/L (ref 136–145)
TRIGL SERPL-MCNC: 66 MG/DL
VLDLC SERPL CALC-MCNC: 13.2 MG/DL

## 2025-06-11 PROCEDURE — 1036F TOBACCO NON-USER: CPT | Performed by: INTERNAL MEDICINE

## 2025-06-11 PROCEDURE — 1123F ACP DISCUSS/DSCN MKR DOCD: CPT | Performed by: INTERNAL MEDICINE

## 2025-06-11 PROCEDURE — G8420 CALC BMI NORM PARAMETERS: HCPCS | Performed by: INTERNAL MEDICINE

## 2025-06-11 PROCEDURE — 1125F AMNT PAIN NOTED PAIN PRSNT: CPT | Performed by: INTERNAL MEDICINE

## 2025-06-11 PROCEDURE — 3017F COLORECTAL CA SCREEN DOC REV: CPT | Performed by: INTERNAL MEDICINE

## 2025-06-11 PROCEDURE — G8427 DOCREV CUR MEDS BY ELIG CLIN: HCPCS | Performed by: INTERNAL MEDICINE

## 2025-06-11 PROCEDURE — 1090F PRES/ABSN URINE INCON ASSESS: CPT | Performed by: INTERNAL MEDICINE

## 2025-06-11 PROCEDURE — G8399 PT W/DXA RESULTS DOCUMENT: HCPCS | Performed by: INTERNAL MEDICINE

## 2025-06-11 PROCEDURE — 1160F RVW MEDS BY RX/DR IN RCRD: CPT | Performed by: INTERNAL MEDICINE

## 2025-06-11 PROCEDURE — 99214 OFFICE O/P EST MOD 30 MIN: CPT | Performed by: INTERNAL MEDICINE

## 2025-06-11 PROCEDURE — 1159F MED LIST DOCD IN RCRD: CPT | Performed by: INTERNAL MEDICINE

## 2025-06-11 NOTE — PROGRESS NOTES
Have you been to the ER, urgent care clinic since your last visit?  Hospitalized since your last visit?   NO    Have you seen or consulted any other health care providers outside our system since your last visit?   NO            
for any clarifications as needed.      No follow-up provider specified.    No results found for any visits on 06/11/25.    Jordon Quispe MD    The patient verbalized understanding of the problems and plans as explained.

## 2025-06-15 ENCOUNTER — RESULTS FOLLOW-UP (OUTPATIENT)
Facility: CLINIC | Age: 70
End: 2025-06-15

## 2025-06-20 ENCOUNTER — HOSPITAL ENCOUNTER (OUTPATIENT)
Facility: HOSPITAL | Age: 70
Discharge: HOME | End: 2025-06-23
Attending: ORTHOPAEDIC SURGERY
Payer: MEDICARE

## 2025-06-20 DIAGNOSIS — M25.462 EFFUSION OF LEFT KNEE: ICD-10-CM

## 2025-06-20 PROCEDURE — 73721 MRI JNT OF LWR EXTRE W/O DYE: CPT

## 2025-08-12 RX ORDER — LEVOTHYROXINE SODIUM 150 UG/1
150 TABLET ORAL DAILY
Qty: 90 TABLET | Refills: 1 | Status: SHIPPED | OUTPATIENT
Start: 2025-08-12

## (undated) DEVICE — SET ADMIN 16ML TBNG L100IN 2 Y INJ SITE IV PIGGY BK DISP

## (undated) DEVICE — YANKAUER,TAPERED BULBOUS TIP,W/O VENT: Brand: MEDLINE

## (undated) DEVICE — DRESSING,GAUZE,XEROFORM,CURAD,1"X8",ST: Brand: CURAD

## (undated) DEVICE — 1200 GUARD II KIT W/5MM TUBE W/O VAC TUBE: Brand: GUARDIAN

## (undated) DEVICE — INFECTION CONTROL KIT SYS

## (undated) DEVICE — MAGNETIC DRAPE: Brand: DEVON

## (undated) DEVICE — SHEET,T,THYROID,STERILE: Brand: MEDLINE

## (undated) DEVICE — LAMINECTOMY RICHMOND-LF: Brand: MEDLINE INDUSTRIES, INC.

## (undated) DEVICE — COVER,MAYO STAND,STERILE: Brand: MEDLINE

## (undated) DEVICE — STERILE POLYISOPRENE POWDER-FREE SURGICAL GLOVES: Brand: PROTEXIS

## (undated) DEVICE — SPONGE: SPECIALTY PEANUT XR 100/CS: Brand: MEDICAL ACTION INDUSTRIES

## (undated) DEVICE — APPLICATOR MEDICATED 26 CC SOLUTION HI LT ORNG CHLORAPREP

## (undated) DEVICE — NEONATAL-ADULT SPO2 SENSOR: Brand: NELLCOR

## (undated) DEVICE — Z CONVERTED USE 2107985 COVER FLROSCP W36XL28IN 4 SIDE ADH

## (undated) DEVICE — DEVON™ KNEE AND BODY STRAP 60" X 3" (1.5 M X 7.6 CM): Brand: DEVON

## (undated) DEVICE — INTENT OT USE PROVIDES A STERILE INTERFACE BETWEEN THE OPERATING ROOM SURGICAL LAMPS (NON-STERILE) AND THE SURGEON OR STAFF WORKING IN THE STERILE FIELD.: Brand: ASPEN® ALC PLUS LIGHT HANDLE COVER

## (undated) DEVICE — SYR 10ML LUER LOK 1/5ML GRAD --

## (undated) DEVICE — NEEDLE SPNL 20GA L3.5IN YEL HUB S STL REG WALL FIT STYL W/

## (undated) DEVICE — Device

## (undated) DEVICE — GLOVE SURG SZ 75 L12IN FNGR THK79MIL GRN LTX FREE

## (undated) DEVICE — COVER,TABLE,60X90,STERILE: Brand: MEDLINE

## (undated) DEVICE — HANDLE LT SNAP ON ULT DURABLE LENS FOR TRUMPF ALC DISPOSABLE

## (undated) DEVICE — SUTURE VCRL SZ 3-0 L18IN ABSRB UD CP-2 L26MM 1/2 CIR REV J761D

## (undated) DEVICE — FLOSEAL HEMOSTATIC MATRIX, 10 ML: Brand: FLOSEAL

## (undated) DEVICE — BASIN EMSIS 16OZ GRAPHITE PLAS KID SHP MOLD GRAD FOR ORAL

## (undated) DEVICE — ELECTRODE,RADIOTRANSLUCENT,FOAM,5PK: Brand: MEDLINE

## (undated) DEVICE — TRAY CATH OD16FR SIL URIN M STATLOK STBL DEV SURSTP

## (undated) DEVICE — MASTISOL ADHESIVE LIQ 2/3ML

## (undated) DEVICE — WATERPROOF, BACTERIA PROOF DRESSING WITH ABSORBENT SEE THROUGH PAD: Brand: OPSITE POST-OP VISIBLE 10X8CM CTN 20

## (undated) DEVICE — BAG SPEC BIOHZRD 10 X 10 IN --

## (undated) DEVICE — BLOCK BITE ENDOSCP AD 21 MM W/ DIL BLU LF DISP

## (undated) DEVICE — SPONGE GZ W4XL4IN COT 12 PLY TYP VII WVN C FLD DSGN STERILE

## (undated) DEVICE — CATH URETH FOL 2W SH 14FRX5ML -- CONVERT TO ITEM 363071

## (undated) DEVICE — DRAPE,REIN 53X77,STERILE: Brand: MEDLINE

## (undated) DEVICE — HYPODERMIC SAFETY NEEDLE: Brand: MAGELLAN

## (undated) DEVICE — GOWN,SIRUS,NONRNF,SETINSLV,XL,20/CS: Brand: MEDLINE

## (undated) DEVICE — KIT,ANTI FOG,W/SPONGE & FLUID,SOFT PACK: Brand: MEDLINE

## (undated) DEVICE — CATH IV AUTOGRD BC PNK 20GA 25 -- INSYTE

## (undated) DEVICE — HYPODERMIC SAFETY NEEDLE: Brand: MONOJECT

## (undated) DEVICE — TOOL 14MH30 LEGEND 14CM 3MM: Brand: MIDAS REX ™

## (undated) DEVICE — FORCEPS BX L160CM DIA8MM GRSP DISECT CUP TIP NONLOCKING ROT

## (undated) DEVICE — SUTURE MCRYL SZ 4-0 L27IN ABSRB UD L19MM PS-2 1/2 CIR PRIM Y426H

## (undated) DEVICE — GARMENT,MEDLINE,DVT,INT,CALF,MED, GEN2: Brand: MEDLINE

## (undated) DEVICE — TOWEL 4 PLY TISS 19X30 SUE WHT

## (undated) DEVICE — GLOVE SURG SZ 6 L12IN FNGR THK79MIL GRN LTX FREE

## (undated) DEVICE — MINOR BASIN -SMH: Brand: MEDLINE INDUSTRIES, INC.

## (undated) DEVICE — BANDAGE COMPR W3INXL5YD WHT BGE POLY COT M E WRP WV HK AND

## (undated) DEVICE — MEDI-VAC NON-CONDUCTIVE SUCTION TUBING: Brand: CARDINAL HEALTH

## (undated) DEVICE — CATH IV AUTOGRD ORN 14GA 45MM -- INSYTE-N

## (undated) DEVICE — SOLUTION IRRIG 1000ML H2O STRL BLT

## (undated) DEVICE — SOLIDIFIER FLD 2OZ 1500CC N DISINF IN BTL DISP SAFESORB

## (undated) DEVICE — SOLUTION IV 1000ML 0.9% SOD CHL

## (undated) DEVICE — CONTAINER SPEC 20 ML LID NEUT BUFF FORMALIN 10 % POLYPR STS

## (undated) DEVICE — (D)STRIP SKN CLSR 0.5X4IN WHT --

## (undated) DEVICE — PREP SKN PREVAIL 40ML APPL --

## (undated) DEVICE — STANDARD (U) BLADE ASSEMBLY 1PK: Brand: MICROAIRE®

## (undated) DEVICE — BANDAGE,ELASTIC,ESMARK,STERILE,4"X9',LF: Brand: MEDLINE

## (undated) DEVICE — SYR 3ML LL TIP 1/10ML GRAD --

## (undated) DEVICE — DISPOSABLE TOURNIQUET CUFF SINGLE BLADDER, DUAL PORT AND QUICK CONNECT CONNECTOR: Brand: COLOR CUFF

## (undated) DEVICE — CASPAR DISTR PIN12MMSTER: Brand: AESCULAP

## (undated) DEVICE — KENDALL SCD EXPRESS SLEEVES, KNEE LENGTH, MEDIUM: Brand: KENDALL SCD

## (undated) DEVICE — SOLUTION IRRIG 1000ML 0.9% SOD CHL USP POUR PLAS BTL

## (undated) DEVICE — Z DISCONTINUED PER MEDLINE LINE GAS SAMPLING O2/CO2 LNG AD 13 FT NSL W/ TBNG FILTERLINE

## (undated) DEVICE — BONE WAX WHITE: Brand: BONE WAX WHITE

## (undated) DEVICE — FLOSEAL MATRIX IS INDICATED IN SURGICAL PROCEDURES (OTHER THAN IN OPHTHALMIC) AS AN ADJUNCT TO HEMOSTASIS WHEN CONTROL OF BLEEDING BY LIGATURE OR CONVENTIONALPROCEDURES IS INEFFECTIVE OR IMPRACTICAL.: Brand: FLOSEAL HEMOSTATIC MATRIX

## (undated) DEVICE — 3M™ STERI-DRAPE™ INSTRUMENT POUCH 1018: Brand: STERI-DRAPE™

## (undated) DEVICE — CORD ELECSURG BPLR 12 FT DISP [810T818750] [ADLER INSTRUMENT CO]

## (undated) DEVICE — PADDING CAST W4INXL4YD ST COT RAYON MICROPLEATED HIGHLY

## (undated) DEVICE — SYRINGE MED 10ML LUERLOCK TIP W/O SFTY DISP

## (undated) DEVICE — SYR ASSEMB INFL BLLN 60ML --

## (undated) DEVICE — SUTURE NONABSORBABLE MONOFILAMENT 4-0 PS-2 18 IN BLK ETHILON 1667G

## (undated) DEVICE — CORD ES L12FT BPLR FRCP

## (undated) DEVICE — DRAPE,HAND,STERILE: Brand: MEDLINE